# Patient Record
Sex: FEMALE | Race: WHITE | NOT HISPANIC OR LATINO | URBAN - METROPOLITAN AREA
[De-identification: names, ages, dates, MRNs, and addresses within clinical notes are randomized per-mention and may not be internally consistent; named-entity substitution may affect disease eponyms.]

---

## 2017-01-31 ENCOUNTER — OUTPATIENT (OUTPATIENT)
Dept: OUTPATIENT SERVICES | Facility: HOSPITAL | Age: 72
LOS: 1 days | End: 2017-01-31
Payer: COMMERCIAL

## 2017-01-31 DIAGNOSIS — R94.31 ABNORMAL ELECTROCARDIOGRAM [ECG] [EKG]: ICD-10-CM

## 2017-01-31 PROCEDURE — 93017 CV STRESS TEST TRACING ONLY: CPT

## 2017-01-31 PROCEDURE — 78452 HT MUSCLE IMAGE SPECT MULT: CPT

## 2017-01-31 PROCEDURE — A9502: CPT

## 2020-04-11 ENCOUNTER — EMERGENCY (EMERGENCY)
Facility: HOSPITAL | Age: 75
LOS: 1 days | Discharge: HOME | End: 2020-04-11
Attending: EMERGENCY MEDICINE
Payer: COMMERCIAL

## 2020-04-11 VITALS
OXYGEN SATURATION: 95 % | WEIGHT: 145.06 LBS | HEART RATE: 81 BPM | DIASTOLIC BLOOD PRESSURE: 91 MMHG | RESPIRATION RATE: 17 BRPM | SYSTOLIC BLOOD PRESSURE: 145 MMHG | TEMPERATURE: 97 F

## 2020-04-11 PROCEDURE — 73110 X-RAY EXAM OF WRIST: CPT

## 2020-04-11 PROCEDURE — 99283 EMERGENCY DEPT VISIT LOW MDM: CPT | Mod: 25

## 2020-04-11 PROCEDURE — 73110 X-RAY EXAM OF WRIST: CPT | Mod: 26,LT

## 2020-04-11 PROCEDURE — 99283 EMERGENCY DEPT VISIT LOW MDM: CPT

## 2020-04-11 NOTE — ED PROVIDER NOTE - NSFOLLOWUPINSTRUCTIONS_ED_ALL_ED_FT
Please call the orthopedist Dr Vega or your primary care doctor for an appointment this week.  Your xray did not show a broken bone today.  Please wear the splint for comfort as tolerated.  You may remove it.  Please check the skin around the swollen area once a day to make sure the skin doesn't break down.  Please return right away if you have increased pain, swelling, redness, weakness, or sensation changes.  You may take Tylenol acetaminophen) 650mg every 6 hours as needed for pain.

## 2020-04-11 NOTE — ED PROVIDER NOTE - IMAGING STUDIES ADDITIONAL INFORMATION FREE TEXT
Interpreted by ED physician Dr. Batista  wrist x-ray, 3 views  No fracture, no dislocation (joint spaces grossly normal), no Foreign Body noted, soft tissue normal

## 2020-04-11 NOTE — ED PROVIDER NOTE - CHPI ED SYMPTOMS POS
This is a 89yr old female patient admitted for Weakness, presenting with the following:  -There is a Stage 1 Pressure Injury to the Bilateral Heels, as evident by non-blanchable erythema  -There is a Stage 2 Pressure Injury to the Coccyx (0.3cm x 0.3cm), L. (4cm x 3cm), and R. Gluteus (2cm x 3cm) with pink tissue, scant drainage, and periwound maceration
PAIN

## 2020-04-11 NOTE — ED PROVIDER NOTE - PATIENT PORTAL LINK FT
You can access the FollowMyHealth Patient Portal offered by [facility] by registering at the following website: [email or web address]. By joining "[employer]’s FollowMyHealth portal, you will also be able to view your health information using other applications (apps) compatible with our system.

## 2020-04-11 NOTE — DISCHARGE NOTE NURSING/CASE MANAGEMENT/SOCIAL WORK - PATIENT PORTAL LINK FT
You can access the FollowMyHealth Patient Portal offered by [facility] by registering at the following website: [email or web address]. By joining [employer]’s FollowMyHealth portal, you will also be able to view your health information using other applications (apps) compatible with our system.

## 2020-04-11 NOTE — ED PROVIDER NOTE - PHYSICAL EXAMINATION
tenderness, swelling and deformity to L wrist   good capillary refill  pulses intact  no elbow tenderness  no clavicle tenderness  no shoulder tenderness

## 2020-04-11 NOTE — ED PROVIDER NOTE - OBJECTIVE STATEMENT
74 y.o female with a PMHx of mild dementia and no PSHx presents to the ED brought in by ambulance  c/o L wrist pain s/p falling yesterday. Patient denies any shortness of breath or any other acute complaints. NKDA

## 2020-12-15 ENCOUNTER — INPATIENT (INPATIENT)
Facility: HOSPITAL | Age: 75
LOS: 2 days | Discharge: NURSING, REHAB OR LONG-TERM CARE | DRG: 536 | End: 2020-12-18
Attending: HOSPITALIST | Admitting: HOSPITALIST
Payer: COMMERCIAL

## 2020-12-15 VITALS
OXYGEN SATURATION: 99 % | HEART RATE: 84 BPM | WEIGHT: 130.51 LBS | DIASTOLIC BLOOD PRESSURE: 88 MMHG | RESPIRATION RATE: 16 BRPM | SYSTOLIC BLOOD PRESSURE: 137 MMHG | TEMPERATURE: 98 F

## 2020-12-15 DIAGNOSIS — S79.911A UNSPECIFIED INJURY OF RIGHT HIP, INITIAL ENCOUNTER: ICD-10-CM

## 2020-12-15 LAB
ALBUMIN SERPL ELPH-MCNC: 3.5 G/DL — SIGNIFICANT CHANGE UP (ref 3.5–5)
ALP SERPL-CCNC: 101 U/L — SIGNIFICANT CHANGE UP (ref 40–120)
ALT FLD-CCNC: 17 U/L DA — SIGNIFICANT CHANGE UP (ref 10–60)
ANION GAP SERPL CALC-SCNC: 8 MMOL/L — SIGNIFICANT CHANGE UP (ref 5–17)
APTT BLD: 30.8 SEC — SIGNIFICANT CHANGE UP (ref 27.5–35.5)
AST SERPL-CCNC: 23 U/L — SIGNIFICANT CHANGE UP (ref 10–40)
BASOPHILS # BLD AUTO: 0.05 K/UL — SIGNIFICANT CHANGE UP (ref 0–0.2)
BASOPHILS NFR BLD AUTO: 0.4 % — SIGNIFICANT CHANGE UP (ref 0–2)
BILIRUB SERPL-MCNC: 0.7 MG/DL — SIGNIFICANT CHANGE UP (ref 0.2–1.2)
BUN SERPL-MCNC: 19 MG/DL — HIGH (ref 7–18)
CALCIUM SERPL-MCNC: 9.5 MG/DL — SIGNIFICANT CHANGE UP (ref 8.4–10.5)
CHLORIDE SERPL-SCNC: 106 MMOL/L — SIGNIFICANT CHANGE UP (ref 96–108)
CO2 SERPL-SCNC: 26 MMOL/L — SIGNIFICANT CHANGE UP (ref 22–31)
CREAT SERPL-MCNC: 0.93 MG/DL — SIGNIFICANT CHANGE UP (ref 0.5–1.3)
EOSINOPHIL # BLD AUTO: 0.05 K/UL — SIGNIFICANT CHANGE UP (ref 0–0.5)
EOSINOPHIL NFR BLD AUTO: 0.4 % — SIGNIFICANT CHANGE UP (ref 0–6)
GLUCOSE SERPL-MCNC: 137 MG/DL — HIGH (ref 70–99)
HCT VFR BLD CALC: 42.2 % — SIGNIFICANT CHANGE UP (ref 34.5–45)
HGB BLD-MCNC: 13.1 G/DL — SIGNIFICANT CHANGE UP (ref 11.5–15.5)
IMM GRANULOCYTES NFR BLD AUTO: 0.5 % — SIGNIFICANT CHANGE UP (ref 0–1.5)
INR BLD: 1.16 RATIO — SIGNIFICANT CHANGE UP (ref 0.88–1.16)
LYMPHOCYTES # BLD AUTO: 1.45 K/UL — SIGNIFICANT CHANGE UP (ref 1–3.3)
LYMPHOCYTES # BLD AUTO: 11.3 % — LOW (ref 13–44)
MCHC RBC-ENTMCNC: 26.9 PG — LOW (ref 27–34)
MCHC RBC-ENTMCNC: 31 GM/DL — LOW (ref 32–36)
MCV RBC AUTO: 86.7 FL — SIGNIFICANT CHANGE UP (ref 80–100)
MONOCYTES # BLD AUTO: 0.65 K/UL — SIGNIFICANT CHANGE UP (ref 0–0.9)
MONOCYTES NFR BLD AUTO: 5 % — SIGNIFICANT CHANGE UP (ref 2–14)
NEUTROPHILS # BLD AUTO: 10.61 K/UL — HIGH (ref 1.8–7.4)
NEUTROPHILS NFR BLD AUTO: 82.4 % — HIGH (ref 43–77)
NRBC # BLD: 0 /100 WBCS — SIGNIFICANT CHANGE UP (ref 0–0)
PLATELET # BLD AUTO: 198 K/UL — SIGNIFICANT CHANGE UP (ref 150–400)
POTASSIUM SERPL-MCNC: 4.1 MMOL/L — SIGNIFICANT CHANGE UP (ref 3.5–5.3)
POTASSIUM SERPL-SCNC: 4.1 MMOL/L — SIGNIFICANT CHANGE UP (ref 3.5–5.3)
PROT SERPL-MCNC: 7.3 G/DL — SIGNIFICANT CHANGE UP (ref 6–8.3)
PROTHROM AB SERPL-ACNC: 13.7 SEC — HIGH (ref 10.6–13.6)
RBC # BLD: 4.87 M/UL — SIGNIFICANT CHANGE UP (ref 3.8–5.2)
RBC # FLD: 14 % — SIGNIFICANT CHANGE UP (ref 10.3–14.5)
SARS-COV-2 RNA SPEC QL NAA+PROBE: SIGNIFICANT CHANGE UP
SODIUM SERPL-SCNC: 140 MMOL/L — SIGNIFICANT CHANGE UP (ref 135–145)
WBC # BLD: 12.88 K/UL — HIGH (ref 3.8–10.5)
WBC # FLD AUTO: 12.88 K/UL — HIGH (ref 3.8–10.5)

## 2020-12-15 PROCEDURE — 99285 EMERGENCY DEPT VISIT HI MDM: CPT

## 2020-12-15 PROCEDURE — 72131 CT LUMBAR SPINE W/O DYE: CPT | Mod: 26

## 2020-12-15 PROCEDURE — 71045 X-RAY EXAM CHEST 1 VIEW: CPT | Mod: 26

## 2020-12-15 PROCEDURE — 73502 X-RAY EXAM HIP UNI 2-3 VIEWS: CPT | Mod: 26,RT

## 2020-12-15 PROCEDURE — 72100 X-RAY EXAM L-S SPINE 2/3 VWS: CPT | Mod: 26

## 2020-12-15 PROCEDURE — 72192 CT PELVIS W/O DYE: CPT | Mod: 26

## 2020-12-15 PROCEDURE — 73551 X-RAY EXAM OF FEMUR 1: CPT | Mod: 26,RT

## 2020-12-15 PROCEDURE — 99222 1ST HOSP IP/OBS MODERATE 55: CPT

## 2020-12-15 RX ORDER — HYDROMORPHONE HYDROCHLORIDE 2 MG/ML
0.5 INJECTION INTRAMUSCULAR; INTRAVENOUS; SUBCUTANEOUS EVERY 6 HOURS
Refills: 0 | Status: DISCONTINUED | OUTPATIENT
Start: 2020-12-15 | End: 2020-12-16

## 2020-12-15 RX ORDER — MORPHINE SULFATE 50 MG/1
4 CAPSULE, EXTENDED RELEASE ORAL ONCE
Refills: 0 | Status: DISCONTINUED | OUTPATIENT
Start: 2020-12-15 | End: 2020-12-15

## 2020-12-15 RX ORDER — SODIUM CHLORIDE 9 MG/ML
1000 INJECTION, SOLUTION INTRAVENOUS
Refills: 0 | Status: DISCONTINUED | OUTPATIENT
Start: 2020-12-15 | End: 2020-12-17

## 2020-12-15 RX ORDER — GLUCAGON INJECTION, SOLUTION 0.5 MG/.1ML
1 INJECTION, SOLUTION SUBCUTANEOUS ONCE
Refills: 0 | Status: DISCONTINUED | OUTPATIENT
Start: 2020-12-15 | End: 2020-12-17

## 2020-12-15 RX ORDER — ONDANSETRON 8 MG/1
4 TABLET, FILM COATED ORAL ONCE
Refills: 0 | Status: COMPLETED | OUTPATIENT
Start: 2020-12-15 | End: 2020-12-15

## 2020-12-15 RX ORDER — ENOXAPARIN SODIUM 100 MG/ML
40 INJECTION SUBCUTANEOUS DAILY
Refills: 0 | Status: DISCONTINUED | OUTPATIENT
Start: 2020-12-15 | End: 2020-12-18

## 2020-12-15 RX ORDER — SODIUM CHLORIDE 9 MG/ML
1000 INJECTION INTRAMUSCULAR; INTRAVENOUS; SUBCUTANEOUS ONCE
Refills: 0 | Status: COMPLETED | OUTPATIENT
Start: 2020-12-15 | End: 2020-12-15

## 2020-12-15 RX ORDER — SODIUM CHLORIDE 9 MG/ML
1000 INJECTION INTRAMUSCULAR; INTRAVENOUS; SUBCUTANEOUS
Refills: 0 | Status: DISCONTINUED | OUTPATIENT
Start: 2020-12-15 | End: 2020-12-16

## 2020-12-15 RX ORDER — ACETAMINOPHEN 500 MG
650 TABLET ORAL EVERY 6 HOURS
Refills: 0 | Status: DISCONTINUED | OUTPATIENT
Start: 2020-12-15 | End: 2020-12-16

## 2020-12-15 RX ORDER — INSULIN LISPRO 100/ML
VIAL (ML) SUBCUTANEOUS
Refills: 0 | Status: DISCONTINUED | OUTPATIENT
Start: 2020-12-15 | End: 2020-12-17

## 2020-12-15 RX ORDER — MORPHINE SULFATE 50 MG/1
2 CAPSULE, EXTENDED RELEASE ORAL EVERY 8 HOURS
Refills: 0 | Status: DISCONTINUED | OUTPATIENT
Start: 2020-12-15 | End: 2020-12-16

## 2020-12-15 RX ADMIN — MORPHINE SULFATE 4 MILLIGRAM(S): 50 CAPSULE, EXTENDED RELEASE ORAL at 21:00

## 2020-12-15 RX ADMIN — ONDANSETRON 4 MILLIGRAM(S): 8 TABLET, FILM COATED ORAL at 20:36

## 2020-12-15 RX ADMIN — MORPHINE SULFATE 4 MILLIGRAM(S): 50 CAPSULE, EXTENDED RELEASE ORAL at 20:36

## 2020-12-15 RX ADMIN — SODIUM CHLORIDE 2000 MILLILITER(S): 9 INJECTION INTRAMUSCULAR; INTRAVENOUS; SUBCUTANEOUS at 20:35

## 2020-12-15 NOTE — ED PROVIDER NOTE - CARE PLAN
Principal Discharge DX:	Hip injury, right, initial encounter  Secondary Diagnosis:	Fall at home, initial encounter

## 2020-12-15 NOTE — ED ADULT TRIAGE NOTE - CHIEF COMPLAINT QUOTE
pt fell down the stairs at 2 pm today, no LOC , no head injury, c/o lower back pain and right hip pain, pt has a Hx of right hip fracture

## 2020-12-15 NOTE — H&P ADULT - PROBLEM SELECTOR PLAN 1
- Patient presents s/p fall associated with no LOC, headache, dizziness, chest pain, SOB.  - likely mechanical,   - CT head Non-contrast :  no acute hemorhage or stroke   - Fall Precaution   - f/u Vitamin B12, B6, Folate, TSH , lipid profile , hgb a1c  - f/u PT consult  - f/u Nutrition consult

## 2020-12-15 NOTE — H&P ADULT - ASSESSMENT
Patient is 75 year old Female from home lives with  with  PMHx of mild to moderate dementia and no significant PSHx presents to ED after slipping and having fall on stairs. Admitted for pain managment.

## 2020-12-15 NOTE — ED PROVIDER NOTE - CPE EDP MUSC NORM
[General Appearance - Well Developed] : well developed [Heart Rate And Rhythm] : heart rate and rhythm were normal [] : no respiratory distress [Left Infraclavicular] : left infraclavicular area [Clean] : clean [Dry] : dry [Well-Healed] : well-healed [Bowel Sounds] : normal bowel sounds [Nail Clubbing] : no clubbing of the fingernails [Abnormal Walk] : normal gait [Skin Color & Pigmentation] : normal skin color and pigmentation [Oriented To Time, Place, And Person] : oriented to person, place, and time [Erythema] : not erythematous normal...

## 2020-12-15 NOTE — H&P ADULT - PROBLEM SELECTOR PLAN 2
Adequate: hears normal conversation without difficulty Pt after fall landed on her right side   reports pain in rt side of back , rt knee and thigh area  f/u x-ray official report  pain management with tylenol, morphin , hydromorph  bowel regimen

## 2020-12-15 NOTE — ED PROVIDER NOTE - CLINICAL SUMMARY MEDICAL DECISION MAKING FREE TEXT BOX
Pt with mechanical fall and isolated R hip and lower back pain. Will X-ray, medicate, check labs, and likely admit for hip fx.

## 2020-12-15 NOTE — ED PROVIDER NOTE - MUSCULOSKELETAL, MLM
R lateral hip tenderness. R SI tenderness. R leg neurovascularly intact. DP & PT pulses intact 2+ in legs

## 2020-12-15 NOTE — ED PROVIDER NOTE - OBJECTIVE STATEMENT
76 y/o F pt with no significant PMHx and no significant PSHx presents to ED s/p fall from stairs c/o R lower back and R hip pain today. Pt reportedly unable to get up secondary to the pain. Pt states she was going up the steps when she slipped and fell. Pt denies LOC, head injury, neck injury, chest pain, abd pain, weakness, paresthesias, bowel or urinary incontinence, or any other acute complaints. NKDA.

## 2020-12-15 NOTE — ED PROVIDER NOTE - CHPI ED SYMPTOMS NEG
no head injury, no neck injury, no chest pain, no abd pain, no weakness, no paresthesias, no bowel or urinary incontinence/no loss of consciousness

## 2020-12-15 NOTE — H&P ADULT - NSHPPHYSICALEXAM_GEN_ALL_CORE
PHYSICAL EXAM:  GENERAL: speaks in full sentences, no signs of respiratory distress, distress from back pain on movement  HEAD:  Atraumatic, Normocephalic  EYES: EOMI, PERRLA, conjunctiva and sclera clear  NECK: Supple, No JVD  CHEST/LUNG: Clear to auscultation bilaterally; No wheeze; No crackles; No accessory muscles used  HEART: s1 s2; No murmurs;   ABDOMEN: Soft, Nontender, Nondistended; Bowel sounds present; No guarding  EXTREMITIES:  2+ Peripheral Pulses, No cyanosis or edema  PSYCH: AAOx3  NEUROLOGY: no-focal deficit  SKIN: No rashes or lesions

## 2020-12-15 NOTE — H&P ADULT - ATTENDING COMMENTS
Pt seen and examined  Case discussed with MAR.  75 year old woman with PMH of mild dementia and surgical hx of right hip replacement for right hip fracture brought in after a fall down the stairs while climbing at home today. There was no head trauma or LOC. No preceding cardiac or neurological symptoms. She complained of lower back and right hip pain with difficulty getting up.    Vital Signs Last 24 Hrs  T(C): 36.7 (15 Dec 2020 19:40), Max: 36.7 (15 Dec 2020 19:40)  T(F): 98 (15 Dec 2020 19:40), Max: 98 (15 Dec 2020 19:40)  HR: 84 (15 Dec 2020 19:40) (84 - 84)  BP: 137/88 (15 Dec 2020 19:40) (137/88 - 137/88)  RR: 16 (15 Dec 2020 19:40) (16 - 16)  SpO2: 99% (15 Dec 2020 19:40) (99% - 99%)                            13.1   12.88 )-----------( 198      ( 15 Dec 2020 20:26 )             42.2     12-15    140  |  106  |  19<H>  ----------------------------<  137<H>  4.1   |  26  |  0.93    Ca    9.5      15 Dec 2020 20:26  TPro  7.3  /  Alb  3.5  /  TBili  0.7  /  DBili  x   /  AST  23  /  ALT  17  /  AlkPhos  101  12-15    PT/INR - ( 15 Dec 2020 20:26 )   PT: 13.7 sec;   INR: 1.16 ratio    PTT - ( 15 Dec 2020 20:26 )  PTT:30.8 sec    XR RIGHT Hip    No fracture noted in the pelvis or in the replaced titanium hip.     XR femur  ?? Suspected fracture of the distal femoral condyle or normal patella    Impression  1) Mechanical fall  2) Right hip pain   3) Physical debility   4)     Plan  Pain control   PT evaluation   IVF gentle hydration Pt seen and examined  Case discussed with MAR.  75 year old woman with PMH of mild dementia and surgical hx of right hip replacement for right hip fracture brought in after a fall down the stairs while climbing at home today. There was no head trauma or LOC. No preceding cardiac or neurological symptoms. She complained of lower back and right hip pain with difficulty getting up.    Vital Signs Last 24 Hrs  T(C): 36.7 (15 Dec 2020 19:40), Max: 36.7 (15 Dec 2020 19:40)  T(F): 98 (15 Dec 2020 19:40), Max: 98 (15 Dec 2020 19:40)  HR: 84 (15 Dec 2020 19:40) (84 - 84)  BP: 137/88 (15 Dec 2020 19:40) (137/88 - 137/88)  RR: 16 (15 Dec 2020 19:40) (16 - 16)  SpO2: 99% (15 Dec 2020 19:40) (99% - 99%)    Elderly woman, lying in bed, NAD, AAO X 3  CTA B/L RRR S1S2 only,   Soft nt ND BS +  Active and passive ROM in both right and left hip and knee joint with no painful restriction  No pedal edema  No focal deficits                          13.1   12.88 )-----------( 198      ( 15 Dec 2020 20:26 )             42.2     12-15    140  |  106  |  19<H>  ----------------------------<  137<H>  4.1   |  26  |  0.93    Ca    9.5      15 Dec 2020 20:26  TPro  7.3  /  Alb  3.5  /  TBili  0.7  /  DBili  x   /  AST  23  /  ALT  17  /  AlkPhos  101  12-15    PT/INR - ( 15 Dec 2020 20:26 )   PT: 13.7 sec;   INR: 1.16 ratio    PTT - ( 15 Dec 2020 20:26 )  PTT:30.8 sec    XR RIGHT Hip    No fracture noted in the pelvis or in the replaced titanium hip.     XR femur  ?? Suspected fracture of the distal femoral condyle or normal patella    Impression  75 year old woman with no significant medical problems with hx of right THR s/p fall- mechanical fall on the right hip with no evidence of a fracture or joint damage  A/P  1) Mechanical fall  2) Right hip pain   3) Physical debility     Plan  1) Pain control   2) PT evaluation   3) Fall risk protocol  4) IVF gentle hydration - X 10 hours  5) Discharge planning - within 12 - 24 hours when able to ambulate.

## 2020-12-15 NOTE — H&P ADULT - HISTORY OF PRESENT ILLNESS
Patient is 75 year old Female from home lives with  with  PMHx of mild to moderate dementia and no significant PSHx presents to ED after slipping and having fall on stairs. Pt reports of landing on her right side so having Rt lower back and Rt hip pain from fall. Pt reportedly unable to get up secondary to the pain which is 9/10 and responds to pain medications. Pt states she was going up the steps when she slipped and fell. Pt reports her bathroom is upstairs so she use stairs several times a day. Pt reports to be very health all herlife.   Pt denies LOC, head injury, neck injury, chest pain, abd pain, weakness, paresthesias, bowel or urinary incontinence, or any other acute complaints.     GOC: pT said she never thought of these things. Full code for now

## 2020-12-16 DIAGNOSIS — S79.911A UNSPECIFIED INJURY OF RIGHT HIP, INITIAL ENCOUNTER: ICD-10-CM

## 2020-12-16 DIAGNOSIS — M54.9 DORSALGIA, UNSPECIFIED: ICD-10-CM

## 2020-12-16 DIAGNOSIS — M97.8XXA PERIPROSTHETIC FRACTURE AROUND OTHER INTERNAL PROSTHETIC JOINT, INITIAL ENCOUNTER: ICD-10-CM

## 2020-12-16 DIAGNOSIS — Z29.9 ENCOUNTER FOR PROPHYLACTIC MEASURES, UNSPECIFIED: ICD-10-CM
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
## 2020-12-16 DIAGNOSIS — Z02.9 ENCOUNTER FOR ADMINISTRATIVE EXAMINATIONS, UNSPECIFIED: ICD-10-CM

## 2020-12-16 DIAGNOSIS — M25.551 PAIN IN RIGHT HIP: ICD-10-CM

## 2020-12-16 DIAGNOSIS — Z71.89 OTHER SPECIFIED COUNSELING: ICD-10-CM

## 2020-12-16 PROBLEM — Z78.9 OTHER SPECIFIED HEALTH STATUS: Chronic | Status: ACTIVE | Noted: 2020-04-11

## 2020-12-16 LAB
24R-OH-CALCIDIOL SERPL-MCNC: 26.8 NG/ML — LOW (ref 30–80)
A1C WITH ESTIMATED AVERAGE GLUCOSE RESULT: 5 % — SIGNIFICANT CHANGE UP (ref 4–5.6)
ALBUMIN SERPL ELPH-MCNC: 3.1 G/DL — LOW (ref 3.5–5)
ALP SERPL-CCNC: 84 U/L — SIGNIFICANT CHANGE UP (ref 40–120)
ALT FLD-CCNC: 12 U/L DA — SIGNIFICANT CHANGE UP (ref 10–60)
AMMONIA BLD-MCNC: 12 UMOL/L — SIGNIFICANT CHANGE UP (ref 11–32)
ANION GAP SERPL CALC-SCNC: 6 MMOL/L — SIGNIFICANT CHANGE UP (ref 5–17)
APPEARANCE UR: CLEAR — SIGNIFICANT CHANGE UP
AST SERPL-CCNC: 16 U/L — SIGNIFICANT CHANGE UP (ref 10–40)
BACTERIA # UR AUTO: ABNORMAL /HPF
BASOPHILS # BLD AUTO: 0.04 K/UL — SIGNIFICANT CHANGE UP (ref 0–0.2)
BASOPHILS NFR BLD AUTO: 0.5 % — SIGNIFICANT CHANGE UP (ref 0–2)
BILIRUB SERPL-MCNC: 0.7 MG/DL — SIGNIFICANT CHANGE UP (ref 0.2–1.2)
BILIRUB UR-MCNC: NEGATIVE — SIGNIFICANT CHANGE UP
BUN SERPL-MCNC: 19 MG/DL — HIGH (ref 7–18)
CALCIUM SERPL-MCNC: 9.5 MG/DL — SIGNIFICANT CHANGE UP (ref 8.4–10.5)
CHLORIDE SERPL-SCNC: 106 MMOL/L — SIGNIFICANT CHANGE UP (ref 96–108)
CHOLEST SERPL-MCNC: 173 MG/DL — SIGNIFICANT CHANGE UP
CK MB BLD-MCNC: 1.8 % — SIGNIFICANT CHANGE UP (ref 0–3.5)
CK MB CFR SERPL CALC: 1.1 NG/ML — SIGNIFICANT CHANGE UP (ref 0–3.6)
CK SERPL-CCNC: 61 U/L — SIGNIFICANT CHANGE UP (ref 21–215)
CO2 SERPL-SCNC: 28 MMOL/L — SIGNIFICANT CHANGE UP (ref 22–31)
COLOR SPEC: YELLOW — SIGNIFICANT CHANGE UP
COMMENT - URINE: SIGNIFICANT CHANGE UP
CREAT SERPL-MCNC: 0.86 MG/DL — SIGNIFICANT CHANGE UP (ref 0.5–1.3)
DIFF PNL FLD: ABNORMAL
EOSINOPHIL # BLD AUTO: 0.02 K/UL — SIGNIFICANT CHANGE UP (ref 0–0.5)
EOSINOPHIL NFR BLD AUTO: 0.2 % — SIGNIFICANT CHANGE UP (ref 0–6)
EPI CELLS # UR: SIGNIFICANT CHANGE UP /HPF
ERYTHROCYTE [SEDIMENTATION RATE] IN BLOOD: 11 MM/HR — SIGNIFICANT CHANGE UP (ref 0–20)
ESTIMATED AVERAGE GLUCOSE: 97 MG/DL — SIGNIFICANT CHANGE UP (ref 68–114)
ETHANOL SERPL-MCNC: <3 MG/DL — SIGNIFICANT CHANGE UP (ref 0–10)
GLUCOSE BLDC GLUCOMTR-MCNC: 102 MG/DL — HIGH (ref 70–99)
GLUCOSE BLDC GLUCOMTR-MCNC: 114 MG/DL — HIGH (ref 70–99)
GLUCOSE BLDC GLUCOMTR-MCNC: 139 MG/DL — HIGH (ref 70–99)
GLUCOSE BLDC GLUCOMTR-MCNC: 92 MG/DL — SIGNIFICANT CHANGE UP (ref 70–99)
GLUCOSE BLDC GLUCOMTR-MCNC: 99 MG/DL — SIGNIFICANT CHANGE UP (ref 70–99)
GLUCOSE SERPL-MCNC: 115 MG/DL — HIGH (ref 70–99)
GLUCOSE UR QL: NEGATIVE — SIGNIFICANT CHANGE UP
HCT VFR BLD CALC: 36.9 % — SIGNIFICANT CHANGE UP (ref 34.5–45)
HDLC SERPL-MCNC: 70 MG/DL — SIGNIFICANT CHANGE UP
HGB BLD-MCNC: 11.6 G/DL — SIGNIFICANT CHANGE UP (ref 11.5–15.5)
HYALINE CASTS # UR AUTO: ABNORMAL /LPF
IMM GRANULOCYTES NFR BLD AUTO: 0.5 % — SIGNIFICANT CHANGE UP (ref 0–1.5)
KETONES UR-MCNC: ABNORMAL
LDH SERPL L TO P-CCNC: 167 U/L — SIGNIFICANT CHANGE UP (ref 120–225)
LEUKOCYTE ESTERASE UR-ACNC: ABNORMAL
LIPID PNL WITH DIRECT LDL SERPL: 91 MG/DL — SIGNIFICANT CHANGE UP
LYMPHOCYTES # BLD AUTO: 1.79 K/UL — SIGNIFICANT CHANGE UP (ref 1–3.3)
LYMPHOCYTES # BLD AUTO: 20.7 % — SIGNIFICANT CHANGE UP (ref 13–44)
MAGNESIUM SERPL-MCNC: 2.1 MG/DL — SIGNIFICANT CHANGE UP (ref 1.6–2.6)
MCHC RBC-ENTMCNC: 27.4 PG — SIGNIFICANT CHANGE UP (ref 27–34)
MCHC RBC-ENTMCNC: 31.4 GM/DL — LOW (ref 32–36)
MCV RBC AUTO: 87 FL — SIGNIFICANT CHANGE UP (ref 80–100)
MONOCYTES # BLD AUTO: 0.55 K/UL — SIGNIFICANT CHANGE UP (ref 0–0.9)
MONOCYTES NFR BLD AUTO: 6.4 % — SIGNIFICANT CHANGE UP (ref 2–14)
NEUTROPHILS # BLD AUTO: 6.19 K/UL — SIGNIFICANT CHANGE UP (ref 1.8–7.4)
NEUTROPHILS NFR BLD AUTO: 71.7 % — SIGNIFICANT CHANGE UP (ref 43–77)
NITRITE UR-MCNC: POSITIVE
NON HDL CHOLESTEROL: 103 MG/DL — SIGNIFICANT CHANGE UP
NRBC # BLD: 0 /100 WBCS — SIGNIFICANT CHANGE UP (ref 0–0)
NT-PROBNP SERPL-SCNC: 1290 PG/ML — HIGH (ref 0–450)
PH UR: 5 — SIGNIFICANT CHANGE UP (ref 5–8)
PHOSPHATE SERPL-MCNC: 3.4 MG/DL — SIGNIFICANT CHANGE UP (ref 2.5–4.5)
PLATELET # BLD AUTO: 176 K/UL — SIGNIFICANT CHANGE UP (ref 150–400)
POTASSIUM SERPL-MCNC: 4.3 MMOL/L — SIGNIFICANT CHANGE UP (ref 3.5–5.3)
POTASSIUM SERPL-SCNC: 4.3 MMOL/L — SIGNIFICANT CHANGE UP (ref 3.5–5.3)
PROT SERPL-MCNC: 6.4 G/DL — SIGNIFICANT CHANGE UP (ref 6–8.3)
PROT UR-MCNC: 15
RBC # BLD: 4.24 M/UL — SIGNIFICANT CHANGE UP (ref 3.8–5.2)
RBC # FLD: 14.1 % — SIGNIFICANT CHANGE UP (ref 10.3–14.5)
RBC CASTS # UR COMP ASSIST: ABNORMAL /HPF (ref 0–2)
SODIUM SERPL-SCNC: 140 MMOL/L — SIGNIFICANT CHANGE UP (ref 135–145)
SP GR SPEC: 1.02 — SIGNIFICANT CHANGE UP (ref 1.01–1.02)
TRIGL SERPL-MCNC: 61 MG/DL — SIGNIFICANT CHANGE UP
TROPONIN I SERPL-MCNC: <0.015 NG/ML — SIGNIFICANT CHANGE UP (ref 0–0.04)
TSH SERPL-MCNC: 1.42 UU/ML — SIGNIFICANT CHANGE UP (ref 0.34–4.82)
URATE SERPL-MCNC: 4.5 MG/DL — SIGNIFICANT CHANGE UP (ref 2.5–7)
UROBILINOGEN FLD QL: NEGATIVE — SIGNIFICANT CHANGE UP
VIT B12 SERPL-MCNC: 546 PG/ML — SIGNIFICANT CHANGE UP (ref 232–1245)
WBC # BLD: 8.63 K/UL — SIGNIFICANT CHANGE UP (ref 3.8–10.5)
WBC # FLD AUTO: 8.63 K/UL — SIGNIFICANT CHANGE UP (ref 3.8–10.5)
WBC UR QL: SIGNIFICANT CHANGE UP /HPF (ref 0–5)

## 2020-12-16 PROCEDURE — 99222 1ST HOSP IP/OBS MODERATE 55: CPT

## 2020-12-16 PROCEDURE — 99232 SBSQ HOSP IP/OBS MODERATE 35: CPT

## 2020-12-16 RX ORDER — PANTOPRAZOLE SODIUM 20 MG/1
40 TABLET, DELAYED RELEASE ORAL
Refills: 0 | Status: DISCONTINUED | OUTPATIENT
Start: 2020-12-16 | End: 2020-12-18

## 2020-12-16 RX ORDER — LOSARTAN POTASSIUM 100 MG/1
0 TABLET, FILM COATED ORAL
Qty: 0 | Refills: 0 | DISCHARGE

## 2020-12-16 RX ORDER — SODIUM CHLORIDE 9 MG/ML
1000 INJECTION INTRAMUSCULAR; INTRAVENOUS; SUBCUTANEOUS
Refills: 0 | Status: DISCONTINUED | OUTPATIENT
Start: 2020-12-16 | End: 2020-12-18

## 2020-12-16 RX ORDER — POLYETHYLENE GLYCOL 3350 17 G/17G
17 POWDER, FOR SOLUTION ORAL DAILY
Refills: 0 | Status: DISCONTINUED | OUTPATIENT
Start: 2020-12-16 | End: 2020-12-18

## 2020-12-16 RX ORDER — ASPIRIN/CALCIUM CARB/MAGNESIUM 324 MG
0 TABLET ORAL
Qty: 0 | Refills: 0 | DISCHARGE

## 2020-12-16 RX ORDER — INFLUENZA VIRUS VACCINE 15; 15; 15; 15 UG/.5ML; UG/.5ML; UG/.5ML; UG/.5ML
0.5 SUSPENSION INTRAMUSCULAR ONCE
Refills: 0 | Status: COMPLETED | OUTPATIENT
Start: 2020-12-16 | End: 2020-12-18

## 2020-12-16 RX ORDER — ACETAMINOPHEN 500 MG
1000 TABLET ORAL EVERY 8 HOURS
Refills: 0 | Status: DISCONTINUED | OUTPATIENT
Start: 2020-12-16 | End: 2020-12-18

## 2020-12-16 RX ORDER — OXYCODONE HYDROCHLORIDE 5 MG/1
5 TABLET ORAL EVERY 4 HOURS
Refills: 0 | Status: DISCONTINUED | OUTPATIENT
Start: 2020-12-16 | End: 2020-12-18

## 2020-12-16 RX ORDER — RIVASTIGMINE 4.6 MG/24H
0 PATCH, EXTENDED RELEASE TRANSDERMAL
Qty: 0 | Refills: 0 | DISCHARGE

## 2020-12-16 RX ORDER — SENNA PLUS 8.6 MG/1
2 TABLET ORAL AT BEDTIME
Refills: 0 | Status: DISCONTINUED | OUTPATIENT
Start: 2020-12-16 | End: 2020-12-18

## 2020-12-16 RX ADMIN — SENNA PLUS 2 TABLET(S): 8.6 TABLET ORAL at 21:32

## 2020-12-16 RX ADMIN — SODIUM CHLORIDE 70 MILLILITER(S): 9 INJECTION INTRAMUSCULAR; INTRAVENOUS; SUBCUTANEOUS at 01:23

## 2020-12-16 RX ADMIN — Medication 1000 MILLIGRAM(S): at 23:15

## 2020-12-16 RX ADMIN — Medication 375 MILLIGRAM(S): at 13:45

## 2020-12-16 RX ADMIN — Medication 1000 MILLIGRAM(S): at 13:44

## 2020-12-16 RX ADMIN — ENOXAPARIN SODIUM 40 MILLIGRAM(S): 100 INJECTION SUBCUTANEOUS at 11:17

## 2020-12-16 RX ADMIN — MORPHINE SULFATE 2 MILLIGRAM(S): 50 CAPSULE, EXTENDED RELEASE ORAL at 02:22

## 2020-12-16 RX ADMIN — Medication 375 MILLIGRAM(S): at 13:03

## 2020-12-16 RX ADMIN — Medication 375 MILLIGRAM(S): at 23:15

## 2020-12-16 RX ADMIN — ENOXAPARIN SODIUM 40 MILLIGRAM(S): 100 INJECTION SUBCUTANEOUS at 01:24

## 2020-12-16 RX ADMIN — Medication 375 MILLIGRAM(S): at 21:32

## 2020-12-16 RX ADMIN — POLYETHYLENE GLYCOL 3350 17 GRAM(S): 17 POWDER, FOR SOLUTION ORAL at 11:17

## 2020-12-16 RX ADMIN — MORPHINE SULFATE 2 MILLIGRAM(S): 50 CAPSULE, EXTENDED RELEASE ORAL at 02:11

## 2020-12-16 RX ADMIN — Medication 1000 MILLIGRAM(S): at 13:02

## 2020-12-16 RX ADMIN — Medication 1000 MILLIGRAM(S): at 21:32

## 2020-12-16 NOTE — CONSULT NOTE ADULT - PROBLEM SELECTOR RECOMMENDATION 9
- Imaging shows Status post right total hip arthroplasty. Mildly displaced acute right proximal femoral periprosthetic fracture in the intertrochanteric/subtrochanteric region. No surgical intervention at this moment.   Nonopioid Recommendations  - mild pain - nonpharm recommendations  - Acetaminophen 1 gram po q 8 for 3 days  - Naproxen 375mg po q 8 hours atc for 3 days  Opioid Recommendation  - Oxycodone 5mg po q 4 hours prn  Bowel Regimen   - Senna  - Miralax  OOB/PT - toe touch

## 2020-12-16 NOTE — CONSULT NOTE ADULT - ASSESSMENT
a Detail Level: Printer-Friendly View Extended View  Confidential Drug Utilization Report  Search Terms: shaneka anthony, 1945Search Date: 12/16/2020 16:17:17 PM  The Drug Utilization Report below displays all of the controlled substance prescriptions, if any, that your patient has filled in the last twelve months. The information displayed on this report is compiled from pharmacy submissions to the Department, and accurately reflects the information as submitted by the pharmacies.    This report was requested by: Winter Barker | Reference #: [id number]    Others' Prescriptions  Patient Name: Shaneka AnthonyBirth Date: 1945  Address: [place], [place] [address]W[place]: Female  Rx Written	Rx Dispensed	Drug	Quantity	Days Supply	Prescriber Name	Payment Method	Dispenser  01/15/2020	01/19/2020	oxycodone-acetaminophen 5-325 mg tab	60	10	Letha Campbell	Insurance	[facility]  01/16/2020	01/16/2020	oxycodone-acetaminophen 5-325 mg tab	30	5	Samuel Cordova MD	[facility]  * - Drugs marked with an asterisk are compound drugs. If the compound drug is made up of more than one controlled substance, then each controlled

## 2020-12-16 NOTE — PROGRESS NOTE ADULT - SUBJECTIVE AND OBJECTIVE BOX
NP Note discussed with Primary Attending      Patient is a 75y old  Female who presents with a chief complaint of fall      INTERVAL HPI/OVERNIGHT EVENTS: no new complaints    MEDICATIONS  (STANDING):  acetaminophen   Tablet .. 1000 milliGRAM(s) Oral every 8 hours  dextrose 5%. 1000 milliLiter(s) (100 mL/Hr) IV Continuous <Continuous>  enoxaparin Injectable 40 milliGRAM(s) SubCutaneous daily  glucagon  Injectable 1 milliGRAM(s) IntraMuscular once  influenza   Vaccine 0.5 milliLiter(s) IntraMuscular once  insulin lispro (ADMELOG) corrective regimen sliding scale   SubCutaneous three times a day before meals  naproxen 375 milliGRAM(s) Oral every 8 hours  pantoprazole    Tablet 40 milliGRAM(s) Oral before breakfast  polyethylene glycol 3350 17 Gram(s) Oral daily  senna 2 Tablet(s) Oral at bedtime  sodium chloride 0.9%. 1000 milliLiter(s) (70 mL/Hr) IV Continuous <Continuous>    MEDICATIONS  (PRN):  oxyCODONE    IR 5 milliGRAM(s) Oral every 4 hours PRN Severe Pain (7 - 10)      __________________________________________________  REVIEW OF SYSTEMS:    CONSTITUTIONAL: No fever,   EYES: no acute visual disturbances  NECK: No pain or stiffness  RESPIRATORY: No cough; No shortness of breath  CARDIOVASCULAR: No chest pain, no palpitations  GASTROINTESTINAL: No pain. No nausea or vomiting; No diarrhea   NEUROLOGICAL: No headache or numbness, no tremors  MUSCULOSKELETAL: No joint pain, no muscle pain, right hip and tight tenderness.  GENITOURINARY: no dysuria, no frequency, no hesitancy  PSYCHIATRY: no depression , no anxiety  ALL OTHER  ROS negative        Vital Signs Last 24 Hrs  T(C): 36.5 (16 Dec 2020 05:34), Max: 36.7 (15 Dec 2020 19:40)  T(F): 97.7 (16 Dec 2020 05:34), Max: 98.1 (16 Dec 2020 01:27)  HR: 62 (16 Dec 2020 05:34) (62 - 84)  BP: 132/71 (16 Dec 2020 05:34) (126/76 - 137/88)  BP(mean): --  RR: 18 (16 Dec 2020 05:34) (16 - 18)  SpO2: 100% (16 Dec 2020 05:34) (98% - 100%)    ________________________________________________  PHYSICAL EXAM:  GENERAL: NAD  HEENT: Normocephalic;  conjunctivae and sclerae clear; moist mucous membranes;   NECK : supple  CHEST/LUNG: Clear to auscultation bilaterally with good air entry   HEART: S1 S2  regular; no murmurs, gallops or rubs  ABDOMEN: Soft, Nontender, Nondistended; Bowel sounds present  EXTREMITIES: no cyanosis; no edema; no calf tenderness  SKIN: warm and dry; no rash  NERVOUS SYSTEM:  Hx of mild dementia, Awake and alert; Oriented  to place, person disoriented to  time ; no new deficits    _________________________________________________  LABS:                        11.6   8.63  )-----------( 176      ( 16 Dec 2020 07:11 )             36.9     12-16    140  |  106  |  19<H>  ----------------------------<  115<H>  4.3   |  28  |  0.86    Ca    9.5      16 Dec 2020 07:11  Phos  3.4     12-16  Mg     2.1     12-16    TPro  6.4  /  Alb  3.1<L>  /  TBili  0.7  /  DBili  x   /  AST  16  /  ALT  12  /  AlkPhos  84  12-16    PT/INR - ( 15 Dec 2020 20:26 )   PT: 13.7 sec;   INR: 1.16 ratio         PTT - ( 15 Dec 2020 20:26 )  PTT:30.8 sec  Urinalysis Basic - ( 16 Dec 2020 02:26 )    Color: Yellow / Appearance: Clear / S.020 / pH: x  Gluc: x / Ketone: Small  / Bili: Negative / Urobili: Negative   Blood: x / Protein: 15 / Nitrite: Positive   Leuk Esterase: Trace / RBC: 10-25 /HPF / WBC 3-5 /HPF   Sq Epi: x / Non Sq Epi: Few /HPF / Bacteria: Few /HPF      CAPILLARY BLOOD GLUCOSE      POCT Blood Glucose.: 102 mg/dL (16 Dec 2020 08:15)        RADIOLOGY & ADDITIONAL TESTS:      EXAM:  CT PELVIS BONY ONLY                            PROCEDURE DATE:  12/15/2020          INTERPRETATION:  HISTORY: Right-sided hip fracture status post fall.    Helical CT imaging of the bony pelvis was performed without intravenous contrast. Sagittal and coronal reformats were provided. 3-D reformats were performed on a separate workstation.    Correlation is made with radiographs from the same day.    Findings:    Patient is status post right total hip arthroplasty with a noncemented femoral component. There is an acute mildly displaced periprosthetic fracture along the anterior cortex of the right femur in the intertrochanteric/subtrochanteric region. There is 0.9 cm of displacement of the dominant fracture fragment. Hardware appears otherwise intact. There is surrounding intramuscular myofascial edema about the fracture.    Chronic healed bilateral superior and inferior pubic rami fractures are noted.    Left hip joint space is preserved. 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 There is lower lumbar spondylosis.    Aspect uterine fibroids are noted. There is colonic diverticulosis.    Impression:    Status post right total hip arthroplasty. Mildly displaced acute right proximal femoral periprosthetic fracture in the intertrochanteric/subtrochanteric region. This change to the preliminary report was discussed with nurse practitioner Jerry on 2020 at 0836 hours.            GHAZAL PARTIDA MD; Attending Radiologist  This document has been electronically signed. Dec 16 2020  8:37AM      EXAM:  XR CHEST AP OR PA 1V                            PROCEDURE DATE:  12/15/2020          INTERPRETATION:  AP chest on December 15, 2020 at 9:15 PM. Patient had a fall.    COMPARISON: None available.    Heart is enlarged.    There is slight linear atelectasis at the left base.    Slight blunting of right base laterally is also seen.    There is a mild to and fro thoracolumbar curve.    IMPRESSION: Heart enlargement and slight linear basal lung findings.        MARISSA GONZALES M.D., ATTENDING RADIOLOGIST  This document has been electronically signed. Dec 16 2020 10:08AM    Imaging  Reviewed:  YES    Consultant(s) Notes Reviewed:   YES      Plan of care was discussed with patient and /or primary care giver; all questions and concerns were addressed

## 2020-12-16 NOTE — DIETITIAN INITIAL EVALUATION ADULT. - OTHER INFO
Patient from home lives with . Visited pt. alert but mildly agitated, reports po intake usually good, consumes 3 meals daily & able to prepares most of her meals daily, denies nausea/vomiting or diarrhea but complains of constipation, able to chew/swallow without difficulty, stated  Lbs >2 wks & unsure of any weight changes?. Presently pt. reports consumed ~45% of breakfast tray & in mild pain, obtained food choices, updated kitchen, encourage po intake, on pain management, no edema, skin intact.

## 2020-12-16 NOTE — DIETITIAN INITIAL EVALUATION ADULT. - PERTINENT LABORATORY DATA
12-16 Na140 mmol/L Glu 115 mg/dL<H> K+ 4.3 mmol/L Cr  0.86 mg/dL BUN 19 mg/dL<H> 12-16 Phos 3.4 mg/dL 12-16 Alb 3.1 g/dL<L> 12-16 Chol 173 mg/dL LDL --    HDL 70 mg/dL Trig 61 mg/dL

## 2020-12-16 NOTE — PROGRESS NOTE ADULT - PROBLEM SELECTOR PLAN 1
Patient s/p mechanical fall with pain to right hip and tight.   CT pelvis-  acute mildly displaced periprosthetic fracture along the anterior cortex of the right femur in the intertrochanteric/subtrochanteric region. There is 0.9 cm of displacement of the dominant fracture fragment.  Ortho consulted, f/u recommendations.  pain mgnt. onboard

## 2020-12-16 NOTE — DIETITIAN INITIAL EVALUATION ADULT. - PROBLEM SELECTOR PLAN 2
Pt after fall landed on her right side   reports pain in rt side of back , rt knee and thigh area  f/u x-ray official report  pain management with tylenol, morphin , hydromorph  bowel regimen

## 2020-12-16 NOTE — PHYSICAL THERAPY INITIAL EVALUATION ADULT - PERTINENT HX OF CURRENT PROBLEM, REHAB EVAL
Patient s/p fall at home. Imaging shows minimally displaced acute R proximal femoral periprosthetic fracture. Patient cleared by ortho for PT. Patient stated that she had hip surgery early this year

## 2020-12-16 NOTE — CONSULT NOTE ADULT - SUBJECTIVE AND OBJECTIVE BOX
SAUL VELASQUEZ  [id number]     ORTHOPEDIC CONSULT    Diagnosis:  R femur dino-prosthetic fx    75yFemale     Patient is 75 year old Female from home lives with  with  PMHx of mild to moderate dementia and no significant PSHx presents to ED after slipping and having fall on stairs. Pt reports of landing on her right side so having Rt lower back and Rt hip pain from fall. Pt reportedly unable to get up secondary to the pain which is 9/10 and responds to pain medications. Pt states she was going up the steps when she slipped and fell. Pt reports her bathroom is upstairs so she use stairs several times a day. Pt reports to be very health all herlife.   Pt denies LOC, head injury, neck injury, chest pain, abd pain, weakness, paresthesias, bowel or urinary incontinence, or any other acute complaints.     GOC: pT said she never thought of these things. Full code for now    74 y/o F presents to the hospital s/p mechanical fall about 1 week ago. Pt states she was walking up the stairs at home and slipped and fell onto her R side. Denies hitting her head or any LOC. Pt states the pain worsened throughout the week causing her to use a walker to ambulate, otherwise she ambulates independently Pt states the pain is localized to the R hip and is 6/10- comes on only with movement. Denies any numbness/tingling. Of note- pt is s/p R LEONIE several years ago although does not recall exactly when or where.   Pt was seen ambulating in the room without a limp or any pain to the right hip.  Denies CP/SOB, dyspnea, paresthesias, N/V/D, palpitations.     Vital Signs Last 24 Hrs  T(C): 36.7 (16 Dec 2020 14:22), Max: 36.7 (15 Dec 2020 19:40)  T(F): 98 (16 Dec 2020 14:22), Max: 98.1 (16 Dec 2020 01:27)  HR: 69 (16 Dec 2020 14:22) (62 - 84)  BP: 117/88 (16 Dec 2020 14:22) (117/88 - 137/88)  BP(mean): --  RR: 18 (16 Dec 2020 14:22) (16 - 18)  SpO2: 98% (16 Dec 2020 14:22) (98% - 100%)  I&O's Detail      Physical Exam:    General:  NAD, resting comfortably in bed.    R Hip: No deformity, No erythema/ecchymosis, Skin is pink and warm, old incision from LEONIE well healed. Minimally tender to the lateral aspect of her hip. ROM of the hip- 0-90 degrees with minimal pain, able to IR and ER with minimal pain and able to straight leg raise. Negative log roll test, negative heel strike.   Lower extremity:  No calf tenderness, calves are soft. 2+pulses. NVI. (+)EHL/FHL/ADF/APF.  Good capillary refill. Warm, well-perfused. SILT.                           11.6   8.63  )-----------( 176      ( 16 Dec 2020 07:11 )             36.9     12-16    140  |  106  |  19<H>  ----------------------------<  115<H>  4.3   |  28  |  0.86    Ca    9.5      16 Dec 2020 07:11  Phos  3.4     12-16  Mg     2.1     12-16    TPro  6.4  /  Alb  3.1<L>  /  TBili  0.7  /  DBili  x   /  AST  16  /  ALT  12  /  AlkPhos  84  12-16    PT/INR - ( 15 Dec 2020 20:26 )   PT: 13.7 sec;   INR: 1.16 ratio         PTT - ( 15 Dec 2020 20:26 )  PTT:30.8 sec  Urinalysis Basic - ( 16 Dec 2020 02:26 )    Color: Yellow / Appearance: Clear / S.020 / pH: x  Gluc: x / Ketone: Small  / Bili: Negative / Urobili: Negative   Blood: x / Protein: 15 / Nitrite: Positive   Leuk Esterase: Trace / RBC: 10-25 /HPF / WBC 3-5 /HPF   Sq Epi: x / Non Sq Epi: Few /HPF / Bacteria: Few /HPF    CT scan: Status post right total hip arthroplasty. Mildly displaced acute right proximal femoral periprosthetic fracture in the intertrochanteric/subtrochanteric region.     Impression:  75yFemale with R Hip dino-prosthetic fx    Plan:  -  D/w - Recommends conservative management- WBAT to the RLE with assistive devices  -  Pain control  -  Daily PT  -  DVT ppx  -  D/w Medical team  -  Follow-up with Dr. Deshpande in ONE WEEK at [phone] SAUL VELASQUEZ  [id number]     ORTHOPEDIC CONSULT    Diagnosis:  R femur dino-prosthetic fx    75yFemale     Patient is 75 year old Female from home lives with  with  PMHx of mild to moderate dementia and no significant PSHx presents to ED after slipping and having fall on stairs. Pt reports of landing on her right side so having Rt lower back and Rt hip pain from fall. Pt reportedly unable to get up secondary to the pain which is 9/10 and responds to pain medications. Pt states she was going up the steps when she slipped and fell. Pt reports her bathroom is upstairs so she use stairs several times a day. Pt reports to be very health all herlife.   Pt denies LOC, head injury, neck injury, chest pain, abd pain, weakness, paresthesias, bowel or urinary incontinence, or any other acute complaints.     GOC: pT said she never thought of these things. Full code for now    76 y/o F presents to the hospital s/p mechanical fall about 1 week ago. Pt states she was walking up the stairs at home and slipped and fell onto her R side. Denies hitting her head or any LOC. Pt states the pain worsened throughout the week causing her to use a walker to ambulate, otherwise she ambulates independently Pt states the pain is localized to the R hip and is 6/10- comes on only with movement. Denies any numbness/tingling. Of note- pt is s/p R LEONIE several years ago although does not recall exactly when or where.   Pt was seen ambulating in the room without a limp or any pain to the right hip.  Denies CP/SOB, dyspnea, paresthesias, N/V/D, palpitations.     Vital Signs Last 24 Hrs  T(C): 36.7 (16 Dec 2020 14:22), Max: 36.7 (15 Dec 2020 19:40)  T(F): 98 (16 Dec 2020 14:22), Max: 98.1 (16 Dec 2020 01:27)  HR: 69 (16 Dec 2020 14:22) (62 - 84)  BP: 117/88 (16 Dec 2020 14:22) (117/88 - 137/88)  BP(mean): --  RR: 18 (16 Dec 2020 14:22) (16 - 18)  SpO2: 98% (16 Dec 2020 14:22) (98% - 100%)  I&O's Detail      Physical Exam:    General:  NAD, resting comfortably in bed.    R Hip: No deformity, No erythema/ecchymosis, Skin is pink and warm, old incision from ELONIE well healed. Minimally tender to the lateral aspect of her hip. ROM of the hip- 0-90 degrees with minimal pain, able to IR and ER with minimal pain and able to straight leg raise. Negative log roll test, negative heel strike.   Lower extremity:  No calf tenderness, calves are soft. 2+pulses. NVI. (+)EHL/FHL/ADF/APF.  Good capillary refill. Warm, well-perfused. SILT.                           11.6   8.63  )-----------( 176      ( 16 Dec 2020 07:11 )             36.9     12-16    140  |  106  |  19<H>  ----------------------------<  115<H>  4.3   |  28  |  0.86    Ca    9.5      16 Dec 2020 07:11  Phos  3.4     12-16  Mg     2.1     12-16    TPro  6.4  /  Alb  3.1<L>  /  TBili  0.7  /  DBili  x   /  AST  16  /  ALT  12  /  AlkPhos  84  12-16    PT/INR - ( 15 Dec 2020 20:26 )   PT: 13.7 sec;   INR: 1.16 ratio         PTT - ( 15 Dec 2020 20:26 )  PTT:30.8 sec  Urinalysis Basic - ( 16 Dec 2020 02:26 )    Color: Yellow / Appearance: Clear / S.020 / pH: x  Gluc: x / Ketone: Small  / Bili: Negative / Urobili: Negative   Blood: x / Protein: 15 / Nitrite: Positive   Leuk Esterase: Trace / RBC: 10-25 /HPF / WBC 3-5 /HPF   Sq Epi: x / Non Sq Epi: Few /HPF / Bacteria: Few /HPF    CT scan: Status post right total hip arthroplasty. Mildly displaced acute right proximal femoral periprosthetic fracture in the intertrochanteric/subtrochanteric region.     Impression:  75yFemale with R Hip dino-prosthetic fx    Plan:  -  D/w - Recommends conservative management- TTWB to the RLE with assistive devices and followup with a Hip specialist  -  Pain control  -  Daily PT  -  DVT ppx  -  D/w Medical team  -  Follow-up with Dr. Deshpande in ONE WEEK at [phone]/pt is to follow up with a Hip specialist as well-

## 2020-12-16 NOTE — PHYSICAL THERAPY INITIAL EVALUATION ADULT - DIAGNOSIS, PT EVAL
Patient presented with pain(4/10) after pain medicine on R hip inhibiting AROM, impaired balance during ambulation

## 2020-12-16 NOTE — PROGRESS NOTE ADULT - ASSESSMENT
HPI:  Patient is 75 year old Female from home lives with  with  PMHx of mild to moderate dementia and no significant PSHx presents to ED after slipping and having fall on stairs. Pt reports of landing on her right side so having Rt lower back and Rt hip pain from fall. Pt states she was going up the steps when she slipped and fell.  Pt denies LOC, head injury, neck injury, chest pain, abd pain, weakness, paresthesias, bowel or urinary incontinence, or any other acute complaints.        Patient seen and examined at bedside, Right upper leg and hip tenderness on exam, denies pain while at rest. Denies chest pain, abd pain, weakness, paresthesias, bowel or urinary incontinence, or any other acute complaints.    HPI:  Patient is 75 year old Female from home lives with  with  PMHx of mild to moderate dementia and no significant PSHx presents to ED after slipping and having fall on stairs. Pt reports of landing on her right side so having Rt lower back and Rt hip pain from fall. Pt states she was going up the steps when she slipped and fell.  Patient admitted to medicine for mechanical fall and right hip pain. Head CT negative for bleeding or CVA, pelvis CT positive for  mildly displaced acute right proximal femoral periprosthetic fracture in the intertrochanteric/subtrochanteric region. Ortho consulted f/u recommendations. Pain management onboard, patient seen and evaluated by PT for discharge planning.    Patient seen and examined at bedside, Right upper leg and hip tenderness on exam, denies pain while at rest. Denies chest pain, abd pain, weakness, paresthesias, bowel or urinary incontinence, or any other acute complaints.

## 2020-12-16 NOTE — DIETITIAN INITIAL EVALUATION ADULT. - PERTINENT MEDS FT
MEDICATIONS  (STANDING):  acetaminophen   Tablet .. 1000 milliGRAM(s) Oral every 8 hours  dextrose 5%. 1000 milliLiter(s) (100 mL/Hr) IV Continuous <Continuous>  enoxaparin Injectable 40 milliGRAM(s) SubCutaneous daily  glucagon  Injectable 1 milliGRAM(s) IntraMuscular once  influenza   Vaccine 0.5 milliLiter(s) IntraMuscular once  insulin lispro (ADMELOG) corrective regimen sliding scale   SubCutaneous three times a day before meals  naproxen 375 milliGRAM(s) Oral every 8 hours  pantoprazole    Tablet 40 milliGRAM(s) Oral before breakfast  polyethylene glycol 3350 17 Gram(s) Oral daily  senna 2 Tablet(s) Oral at bedtime  sodium chloride 0.9%. 1000 milliLiter(s) (70 mL/Hr) IV Continuous <Continuous>    MEDICATIONS  (PRN):  oxyCODONE    IR 5 milliGRAM(s) Oral every 4 hours PRN Severe Pain (7 - 10)

## 2020-12-16 NOTE — CONSULT NOTE ADULT - ATTENDING COMMENTS
stable [place] A  no pain  protected wb 6 weeks  avoid pivoting    no surgery planned at this time

## 2020-12-16 NOTE — CONSULT NOTE ADULT - SUBJECTIVE AND OBJECTIVE BOX
Source of information: , Chart review, patient  Patient language: English  : n/a    CC: Patient is a 75y old  Female who presents with a chief complaint of fall (16 Dec 2020 11:19)      HPI:   Patient is 75 year old Female from home lives with  with  PMHx of mild to moderate dementia and no significant PSHx presents to ED after slipping and having fall on stairs. Pt reports of landing on her right side so having Rt lower back and Rt hip pain from fall. Pt reportedly unable to get up secondary to the pain which is 9/10 and responds to pain medications. Pt states she was going up the steps when she slipped and fell. Pt reports her bathroom is upstairs so she use stairs several times a day. Pt reports to be very health all herlife.   Pt denies LOC, head injury, neck injury, chest pain, abd pain, weakness, paresthesias, bowel or urinary incontinence, or any other acute complaints.     GOC: pT said she never thought of these things. Full code for now (15 Dec 2020 22:00)    75 year old female s/p fall.  Pt with right hip pain.  Pt is s/p right hip replacement years ago.  Pain is well controlled at rest but increases on exertion. Imaging shows Status post right total hip arthroplasty. Mildly displaced acute right proximal femoral periprosthetic fracture in the intertrochanteric/subtrochanteric region. T Pt seen by ortho is recommending conservative mgt.  Pt to be toe touch for PT.  No chest pain or sob.     PAIN SCORE:  5/10     SCALE USED: (1-10 VNRS)    PAST MEDICAL & SURGICAL HISTORY:  No pertinent past medical history    No significant past surgical history        FAMILY HISTORY:        SOCIAL HISTORY:  [x ] Denies Smoking, Alcohol, or Drug Use    Allergies    No Known Allergies    Intolerances        MEDICATIONS:    MEDICATIONS  (STANDING):  acetaminophen   Tablet .. 1000 milliGRAM(s) Oral every 8 hours  dextrose 5%. 1000 milliLiter(s) (100 mL/Hr) IV Continuous <Continuous>  enoxaparin Injectable 40 milliGRAM(s) SubCutaneous daily  glucagon  Injectable 1 milliGRAM(s) IntraMuscular once  influenza   Vaccine 0.5 milliLiter(s) IntraMuscular once  insulin lispro (ADMELOG) corrective regimen sliding scale   SubCutaneous three times a day before meals  naproxen 375 milliGRAM(s) Oral every 8 hours  pantoprazole    Tablet 40 milliGRAM(s) Oral before breakfast  polyethylene glycol 3350 17 Gram(s) Oral daily  senna 2 Tablet(s) Oral at bedtime  sodium chloride 0.9%. 1000 milliLiter(s) (70 mL/Hr) IV Continuous <Continuous>    MEDICATIONS  (PRN):  oxyCODONE    IR 5 milliGRAM(s) Oral every 4 hours PRN Severe Pain (7 - 10)      Vital Signs Last 24 Hrs  T(C): 36.7 (16 Dec 2020 14:22), Max: 36.7 (15 Dec 2020 19:40)  T(F): 98 (16 Dec 2020 14:22), Max: 98.1 (16 Dec 2020 01:27)  HR: 69 (16 Dec 2020 14:22) (62 - 84)  BP: 117/88 (16 Dec 2020 14:22) (117/88 - 142/81)  BP(mean): --  RR: 18 (16 Dec 2020 14:22) (16 - 18)  SpO2: 98% (16 Dec 2020 14:22) (94% - 100%)    LABS:                          11.6   8.63  )-----------( 176      ( 16 Dec 2020 07:11 )             36.9     12-16    140  |  106  |  19<H>  ----------------------------<  115<H>  4.3   |  28  |  0.86    Ca    9.5      16 Dec 2020 07:11  Phos  3.4     12-16  Mg     2.1     12-16    TPro  6.4  /  Alb  3.1<L>  /  TBili  0.7  /  DBili  x   /  AST  16  /  ALT  12  /  AlkPhos  84  12-16    PT/INR - ( 15 Dec 2020 20:26 )   PT: 13.7 sec;   INR: 1.16 ratio         PTT - ( 15 Dec 2020 20:26 )  PTT:30.8 sec  LIVER FUNCTIONS - ( 16 Dec 2020 07:11 )  Alb: 3.1 g/dL / Pro: 6.4 g/dL / ALK PHOS: 84 U/L / ALT: 12 U/L DA / AST: 16 U/L / GGT: x           Urinalysis Basic - ( 16 Dec 2020 02:26 )    Color: Yellow / Appearance: Clear / S.020 / pH: x  Gluc: x / Ketone: Small  / Bili: Negative / Urobili: Negative   Blood: x / Protein: 15 / Nitrite: Positive   Leuk Esterase: Trace / RBC: 10-25 /HPF / WBC 3-5 /HPF   Sq Epi: x / Non Sq Epi: Few /HPF / Bacteria: Few /HPF      CAPILLARY BLOOD GLUCOSE      POCT Blood Glucose.: 99 mg/dL (16 Dec 2020 12:00)  POCT Blood Glucose.: 102 mg/dL (16 Dec 2020 08:15)      REVIEW OF SYSTEMS:  CONSTITUTIONAL: No fever or fatigue  RESPIRATORY: No cough, wheezing, chills or hemoptysis; No shortness of breath  CARDIOVASCULAR: No chest pain, palpitations, dizziness, or leg swelling  GASTROINTESTINAL: No abdominal or epigastric pain. No nausea, vomiting; No diarrhea or constipation.   GENITOURINARY: No dysuria, frequency, hematuria, retention or incontinence  MUSCULOSKELETAL: No joint pain or swelling; No muscle, back, or extremity pain, no upper or lower motor strength weakness, no saddle anesthesia, bowel/bladder incontinence, no falls   NEURO: No weakness, no numbness   PSYCHIATRIC: No depression, anxiety, mood swings, or difficulty sleeping    PHYSICAL EXAM:  GENERAL:  Alert & Oriented X3, NAD, Good concentration  CHEST/LUNG: Clear to auscultation bilaterally; No rales, rhonchi, wheezing, or rubs  HEART: Regular rate and rhythm; No murmurs, rubs, or gallops  ABDOMEN: Soft, Nontender, Nondistended; Bowel sounds present  : no incontinence, no flank pain  EXTREMITIES:  2+ Peripheral Pulses, No cyanosis, or edema  MUSCULOSKELETAL: Motor Strength 5/5 B/L upper and lower extremities; moves all extremities equally against gravity; ROM intact; negative SRL  NEUROLOGICAL: awake and alert and oriented   SKIN: No rashes or lesions    Radiology:  < from: CT Pelvis Bony Only No Cont (.15.20 @ 22:36) >    EXAM:  CT PELVIS BONY ONLY                            PROCEDURE DATE:  12/15/2020          INTERPRETATION:  HISTORY: Right-sided hip fracture status post fall.    Helical CT imaging of the bony pelvis was performed without intravenous contrast. Sagittal and coronal reformats were provided. 3-D reformats were performed on a separate workstation.    Correlation is made with radiographs from the same day.    Findings:    Patient is status post right total hip arthroplasty with a noncemented femoral component. There is an acute mildly displaced periprosthetic fracture along the anterior cortex of the right femur in the intertrochanteric/subtrochanteric region. There is 0.9 cm of displacement of the dominant fracture fragment. Hardware appears otherwise intact. There is surrounding intramuscular myofascial edema about the fracture.    Chronic healed bilateral superior and inferior pubic rami fractures are noted.    Left hip joint space is preserved. 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 There is lower lumbar spondylosis.    Aspect uterine fibroids are noted. There is colonic diverticulosis.    Impression:    Status post right total hip arthroplasty. Mildly displaced acute right proximal femoral periprosthetic fracture in the intertrochanteric/subtrochanteric region. This change to the preliminary report was discussed with nurse practitioner Jerry on 2020 at 0836 hours.    < end of copied text >      Drug Screen:  enoxaparin Injectable 40 milliGRAM(s) SubCutaneous daily          ORT Score   Family Hx of substance abuse	Female	Male  Alcohol 	                                              1              3  Illegal drugs	                                      2              3  Rx drugs                                               4	      4    Personal Hx of substance abuse		  Alcohol 	                                               3	      3  Illegal drugs                                  	       4	      4  Rx drugs                                                5	      5  Age between 16- 45 years	               1             1  hx preadolescent sexual abuse	       3	      0  Psychological disease		  ADD, OCD, bipolar, schizophrenia	2	      2  Depression                                    	1	      1  Score totals 		  		  a score of 3 or lower indicates low risk for opioid abuse		  a score of 4-7 indicates moderate risk for opioid abuse		  a score of 8 or higher indicates high risk for opioid abuse	    Risk factors associated with adverse outcomes related to opioid treatment  [ ]  Concurrent benzodiazepine use  [ ]  History/ Active substance use or alcohol use disorder  [ ] Psychiatric co-morbidity  [ ] Sleep apnea  [ ] COPD  [ ] BMI> 35  [ ] Liver dysfunction  [ ] Renal dysfunction  [ ] CHF  [ ] Smoker  [x ]  Age > 60 years      [x ]  [facility]  Reviewed and Copied to Chart. See below.
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
## 2020-12-17 LAB
ALBUMIN SERPL ELPH-MCNC: 3 G/DL — LOW (ref 3.5–5)
ALP SERPL-CCNC: 79 U/L — SIGNIFICANT CHANGE UP (ref 40–120)
ALT FLD-CCNC: 12 U/L DA — SIGNIFICANT CHANGE UP (ref 10–60)
ANION GAP SERPL CALC-SCNC: 6 MMOL/L — SIGNIFICANT CHANGE UP (ref 5–17)
AST SERPL-CCNC: 9 U/L — LOW (ref 10–40)
BILIRUB SERPL-MCNC: 0.6 MG/DL — SIGNIFICANT CHANGE UP (ref 0.2–1.2)
BUN SERPL-MCNC: 18 MG/DL — SIGNIFICANT CHANGE UP (ref 7–18)
CALCIUM SERPL-MCNC: 9 MG/DL — SIGNIFICANT CHANGE UP (ref 8.4–10.5)
CHLORIDE SERPL-SCNC: 106 MMOL/L — SIGNIFICANT CHANGE UP (ref 96–108)
CO2 SERPL-SCNC: 28 MMOL/L — SIGNIFICANT CHANGE UP (ref 22–31)
CREAT SERPL-MCNC: 0.74 MG/DL — SIGNIFICANT CHANGE UP (ref 0.5–1.3)
GLUCOSE BLDC GLUCOMTR-MCNC: 112 MG/DL — HIGH (ref 70–99)
GLUCOSE BLDC GLUCOMTR-MCNC: 86 MG/DL — SIGNIFICANT CHANGE UP (ref 70–99)
GLUCOSE BLDC GLUCOMTR-MCNC: 90 MG/DL — SIGNIFICANT CHANGE UP (ref 70–99)
GLUCOSE BLDC GLUCOMTR-MCNC: 93 MG/DL — SIGNIFICANT CHANGE UP (ref 70–99)
GLUCOSE SERPL-MCNC: 82 MG/DL — SIGNIFICANT CHANGE UP (ref 70–99)
HCT VFR BLD CALC: 35.4 % — SIGNIFICANT CHANGE UP (ref 34.5–45)
HGB BLD-MCNC: 11.3 G/DL — LOW (ref 11.5–15.5)
MCHC RBC-ENTMCNC: 27.5 PG — SIGNIFICANT CHANGE UP (ref 27–34)
MCHC RBC-ENTMCNC: 31.9 GM/DL — LOW (ref 32–36)
MCV RBC AUTO: 86.1 FL — SIGNIFICANT CHANGE UP (ref 80–100)
NRBC # BLD: 0 /100 WBCS — SIGNIFICANT CHANGE UP (ref 0–0)
PLATELET # BLD AUTO: 159 K/UL — SIGNIFICANT CHANGE UP (ref 150–400)
POTASSIUM SERPL-MCNC: 3.7 MMOL/L — SIGNIFICANT CHANGE UP (ref 3.5–5.3)
POTASSIUM SERPL-SCNC: 3.7 MMOL/L — SIGNIFICANT CHANGE UP (ref 3.5–5.3)
PROT SERPL-MCNC: 6.1 G/DL — SIGNIFICANT CHANGE UP (ref 6–8.3)
RBC # BLD: 4.11 M/UL — SIGNIFICANT CHANGE UP (ref 3.8–5.2)
RBC # FLD: 14 % — SIGNIFICANT CHANGE UP (ref 10.3–14.5)
SARS-COV-2 IGG SERPL QL IA: NEGATIVE — SIGNIFICANT CHANGE UP
SARS-COV-2 IGM SERPL IA-ACNC: <0.1 INDEX — SIGNIFICANT CHANGE UP
SODIUM SERPL-SCNC: 140 MMOL/L — SIGNIFICANT CHANGE UP (ref 135–145)
WBC # BLD: 7.5 K/UL — SIGNIFICANT CHANGE UP (ref 3.8–10.5)
WBC # FLD AUTO: 7.5 K/UL — SIGNIFICANT CHANGE UP (ref 3.8–10.5)

## 2020-12-17 PROCEDURE — 99221 1ST HOSP IP/OBS SF/LOW 40: CPT

## 2020-12-17 PROCEDURE — 99232 SBSQ HOSP IP/OBS MODERATE 35: CPT

## 2020-12-17 RX ADMIN — Medication 375 MILLIGRAM(S): at 05:46

## 2020-12-17 RX ADMIN — PANTOPRAZOLE SODIUM 40 MILLIGRAM(S): 20 TABLET, DELAYED RELEASE ORAL at 05:47

## 2020-12-17 RX ADMIN — OXYCODONE HYDROCHLORIDE 5 MILLIGRAM(S): 5 TABLET ORAL at 10:16

## 2020-12-17 RX ADMIN — Medication 1000 MILLIGRAM(S): at 14:00

## 2020-12-17 RX ADMIN — Medication 375 MILLIGRAM(S): at 14:00

## 2020-12-17 RX ADMIN — ENOXAPARIN SODIUM 40 MILLIGRAM(S): 100 INJECTION SUBCUTANEOUS at 11:30

## 2020-12-17 RX ADMIN — Medication 375 MILLIGRAM(S): at 21:22

## 2020-12-17 RX ADMIN — Medication 1000 MILLIGRAM(S): at 13:16

## 2020-12-17 RX ADMIN — POLYETHYLENE GLYCOL 3350 17 GRAM(S): 17 POWDER, FOR SOLUTION ORAL at 11:30

## 2020-12-17 RX ADMIN — SENNA PLUS 2 TABLET(S): 8.6 TABLET ORAL at 21:23

## 2020-12-17 RX ADMIN — Medication 1000 MILLIGRAM(S): at 05:46

## 2020-12-17 RX ADMIN — Medication 375 MILLIGRAM(S): at 13:17

## 2020-12-17 RX ADMIN — Medication 1000 MILLIGRAM(S): at 21:22

## 2020-12-17 RX ADMIN — Medication 1000 MILLIGRAM(S): at 08:04

## 2020-12-17 RX ADMIN — Medication 375 MILLIGRAM(S): at 08:04

## 2020-12-17 RX ADMIN — OXYCODONE HYDROCHLORIDE 5 MILLIGRAM(S): 5 TABLET ORAL at 09:27

## 2020-12-17 NOTE — PROGRESS NOTE ADULT - SUBJECTIVE AND OBJECTIVE BOX
Source of information: , Chart review  Patient language: English  : n/a    CC:      This is a 75y old  Female who presents with a chief complaint of fall (17 Dec 2020 09:05)  Patient is 75 year old Female from home lives with  with  PMHx of mild to moderate dementia and no significant PSHx presents to ED after slipping and having fall on stairs. Pt reports of landing on her right side so having Rt lower back and Rt hip pain from fall. Pt reportedly unable to get up secondary to the pain which is 9/10 and responds to pain medications. Pt states she was going up the steps when she slipped and fell. Pt reports her bathroom is upstairs so she use stairs several times a day. Pt reports to be very health all herlife.   Pt denies LOC, head injury, neck injury, chest pain, abd pain, weakness, paresthesias, bowel or urinary incontinence, or any other acute complaints.     patient seen and evaluated at bedside, lying supine in bed, in NAD; denies pain at rest, verbalized mild pain with exertion. Imaging shows Status post right total hip arthroplasty. Mildly displaced acute right proximal femoral periprosthetic fracture in the intertrochanteric/subtrochanteric region. Pt seen by ortho, recommending conservative mgt.  Pt to be toe touch for PT.  Patient stated goal for pain control: to be able to take deep breaths, get out of bed to chair and ambulate with tolerable pain control.     PAIN SCORE: 1  SCALE USED: (1-10 NRS)      PAST MEDICAL & SURGICAL HISTORY:  No pertinent past medical history    No significant past surgical history        FAMILY HISTORY:        SOCIAL HISTORY:  [ ] Denies Smoking, Alcohol, or Drug Use    Allergies    No Known Allergies    Intolerances        MEDICATIONS:    MEDICATIONS  (STANDING):  acetaminophen   Tablet .. 1000 milliGRAM(s) Oral every 8 hours  enoxaparin Injectable 40 milliGRAM(s) SubCutaneous daily  influenza   Vaccine 0.5 milliLiter(s) IntraMuscular once  naproxen 375 milliGRAM(s) Oral every 8 hours  pantoprazole    Tablet 40 milliGRAM(s) Oral before breakfast  polyethylene glycol 3350 17 Gram(s) Oral daily  senna 2 Tablet(s) Oral at bedtime  sodium chloride 0.9%. 1000 milliLiter(s) (70 mL/Hr) IV Continuous <Continuous>    MEDICATIONS  (PRN):  oxyCODONE    IR 5 milliGRAM(s) Oral every 4 hours PRN Severe Pain (7 - 10)      Vital Signs Last 24 Hrs  T(C): 36.4 (17 Dec 2020 05:24), Max: 36.7 (16 Dec 2020 14:22)  T(F): 97.5 (17 Dec 2020 05:24), Max: 98 (16 Dec 2020 14:22)  HR: 59 (17 Dec 2020 09:17) (54 - 76)  BP: 150/75 (17 Dec 2020 09:17) (117/88 - 159/80)  BP(mean): --  RR: 17 (17 Dec 2020 05:24) (17 - 18)  SpO2: 97% (17 Dec 2020 09:17) (94% - 98%)    LABS:                          11.3   7.50  )-----------( 159      ( 17 Dec 2020 06:21 )             35.4     12-17    140  |  106  |  18  ----------------------------<  82  3.7   |  28  |  0.74    Ca    9.0      17 Dec 2020 06:21  Phos  3.4     12-16  Mg     2.1     12-16    TPro  6.1  /  Alb  3.0<L>  /  TBili  0.6  /  DBili  x   /  AST  9<L>  /  ALT  12  /  AlkPhos  79  12-17    PT/INR - ( 15 Dec 2020 20:26 )   PT: 13.7 sec;   INR: 1.16 ratio         PTT - ( 15 Dec 2020 20:26 )  PTT:30.8 sec  LIVER FUNCTIONS - ( 17 Dec 2020 06:21 )  Alb: 3.0 g/dL / Pro: 6.1 g/dL / ALK PHOS: 79 U/L / ALT: 12 U/L DA / AST: 9 U/L / GGT: x           Urinalysis Basic - ( 16 Dec 2020 02:26 )    Color: Yellow / Appearance: Clear / S.020 / pH: x  Gluc: x / Ketone: Small  / Bili: Negative / Urobili: Negative   Blood: x / Protein: 15 / Nitrite: Positive   Leuk Esterase: Trace / RBC: 10-25 /HPF / WBC 3-5 /HPF   Sq Epi: x / Non Sq Epi: Few /HPF / Bacteria: Few /HPF      CAPILLARY BLOOD GLUCOSE      POCT Blood Glucose.: 90 mg/dL (17 Dec 2020 10:57)  POCT Blood Glucose.: 86 mg/dL (17 Dec 2020 08:02)  POCT Blood Glucose.: 114 mg/dL (16 Dec 2020 21:24)  POCT Blood Glucose.: 92 mg/dL (16 Dec 2020 17:06)      Radiology:    EXAM:  CT PELVIS BONY ONLY                            PROCEDURE DATE:  12/15/2020          INTERPRETATION:  HISTORY: Right-sided hip fracture status post fall.    Helical CT imaging of the bony pelvis was performed without intravenous contrast. Sagittal and coronal reformats were provided. 3-D reformats were performed on a separate workstation.    Correlation is made with radiographs from the same day.    Findings:    Patient is status post right total hip arthroplasty with a noncemented femoral component. There is an acute mildly displaced periprosthetic fracture along the anterior cortex of the right femur in the intertrochanteric/subtrochanteric region. There is 0.9 cm of displacement of the dominant fracture fragment. Hardware appears otherwise intact. There is surrounding intramuscular myofascial edema about the fracture.    Chronic healed bilateral superior and inferior pubic rami fractures are noted.    Left hip joint space is preserved. 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 There is lower lumbar spondylosis.    Aspect uterine fibroids are noted. There is colonic diverticulosis.    Impression:    Status post right total hip arthroplasty. Mildly displaced acute right proximal femoral periprosthetic fracture in the intertrochanteric/subtrochanteric region. This change to the preliminary report was discussed with nurse practitioner Jerry on 2020 at 0836 hours.            GHAZAL PARTIDA MD; Attending Radiologist  This document has been electronically signed. Dec 16 2020  8:37AM    Drug Screen:        REVIEW OF SYSTEMS:  CONSTITUTIONAL: No fever or fatigue  RESPIRATORY: No cough, wheezing, chills or hemoptysis; No shortness of breath  CARDIOVASCULAR: No chest pain, palpitations, dizziness, or leg swelling  GASTROINTESTINAL: No abdominal or epigastric pain. No nausea, vomiting; No diarrhea or constipation.   GENITOURINARY: No dysuria, frequency, hematuria, retention or incontinence  MUSCULOSKELETAL: No joint pain or swelling; No muscle, back, or extremity pain, no upper or lower motor strength weakness, no saddle anesthesia, bowel/bladder incontinence, no falls   NEURO: No headaches, No numbness/tingling b/l LE, No weakness  PSYCHIATRIC: No depression, anxiety, mood swings, or difficulty sleeping    PHYSICAL EXAM:  GENERAL:  Alert & Oriented X3, NAD,   CHEST/LUNG: Clear to auscultation bilaterally; No rales, rhonchi, wheezing, or rubs  HEART: Regular rate and rhythm; No murmurs, rubs, or gallops  ABDOMEN: Soft, Nontender, Nondistended; Bowel sounds present  EXTREMITIES:  2+ Peripheral Pulses, No cyanosis, or edema  MUSCULOSKELETAL: + decreased rom Motor Strength 5/5 B/L upper and lower extremities; moves all extremities equally against gravity; ROM intact; negative SLR  SKIN: No rashes or lesions    Risk factors associated with adverse outcomes related to opioid treatment  [ ]  Concurrent benzodiazepine use  [ ]  History/ Active substance use or alcohol use disorder  [ ] Psychiatric co-morbidity  [ ] Sleep apnea  [ ] COPD  [ ] BMI> 35  [ ] Liver dysfunction  [ ] Renal dysfunction  [ ] CHF  [ ] Smoker  [X ]  Age > 60 years    [ ]  [place]  Reviewed and Copied to Chart. See below.    Plan of care and goal oriented pain management treatment options were discussed with patient and /or primary care giver; all questions and concerns were addressed and care was aligned with patient's wishes.    Educated patient on goal oriented pain management treatment options     20 @ 12:42 Source of information: , Chart review  Patient language: English  : n/a    CC:      This is a 75y old  Female who presents with a chief complaint of fall (17 Dec 2020 09:05)  Patient is 75 year old Female from home lives with  with  PMHx of mild to moderate dementia and no significant PSHx presents to ED after slipping and having fall on stairs. Pt reports of landing on her right side so having Rt lower back and Rt hip pain from fall. Pt reportedly unable to get up secondary to the pain which is 9/10 and responds to pain medications. Pt states she was going up the steps when she slipped and fell. Pt reports her bathroom is upstairs so she use stairs several times a day. Pt reports to be very health all herlife.   Pt denies LOC, head injury, neck injury, chest pain, abd pain, weakness, paresthesias, bowel or urinary incontinence, or any other acute complaints.     patient seen and evaluated at bedside, lying supine in bed, in NAD; denies pain at rest, verbalized mild pain with exertion. Imaging shows Status post right total hip arthroplasty. Mildly displaced acute right proximal femoral periprosthetic fracture in the intertrochanteric/subtrochanteric region. Pt seen by ortho, recommending conservative mgt.  Pt to be toe touch for PT.  Patient stated goal for pain control: to be able to take deep breaths, get out of bed to chair and ambulate with tolerable pain control.     PAIN SCORE: 1  SCALE USED: (1-10 NRS)      PAST MEDICAL & SURGICAL HISTORY:  No pertinent past medical history    No significant past surgical history        FAMILY HISTORY:        SOCIAL HISTORY:  [x ] Denies Smoking, Alcohol, or Drug Use    Allergies    No Known Allergies    Intolerances        MEDICATIONS:    MEDICATIONS  (STANDING):  acetaminophen   Tablet .. 1000 milliGRAM(s) Oral every 8 hours  enoxaparin Injectable 40 milliGRAM(s) SubCutaneous daily  influenza   Vaccine 0.5 milliLiter(s) IntraMuscular once  naproxen 375 milliGRAM(s) Oral every 8 hours  pantoprazole    Tablet 40 milliGRAM(s) Oral before breakfast  polyethylene glycol 3350 17 Gram(s) Oral daily  senna 2 Tablet(s) Oral at bedtime  sodium chloride 0.9%. 1000 milliLiter(s) (70 mL/Hr) IV Continuous <Continuous>    MEDICATIONS  (PRN):  oxyCODONE    IR 5 milliGRAM(s) Oral every 4 hours PRN Severe Pain (7 - 10)      Vital Signs Last 24 Hrs  T(C): 36.4 (17 Dec 2020 05:24), Max: 36.7 (16 Dec 2020 14:22)  T(F): 97.5 (17 Dec 2020 05:24), Max: 98 (16 Dec 2020 14:22)  HR: 59 (17 Dec 2020 09:17) (54 - 76)  BP: 150/75 (17 Dec 2020 09:17) (117/88 - 159/80)  BP(mean): --  RR: 17 (17 Dec 2020 05:24) (17 - 18)  SpO2: 97% (17 Dec 2020 09:17) (94% - 98%)    LABS:                          11.3   7.50  )-----------( 159      ( 17 Dec 2020 06:21 )             35.4     12-17    140  |  106  |  18  ----------------------------<  82  3.7   |  28  |  0.74    Ca    9.0      17 Dec 2020 06:21  Phos  3.4     12-16  Mg     2.1     12-16    TPro  6.1  /  Alb  3.0<L>  /  TBili  0.6  /  DBili  x   /  AST  9<L>  /  ALT  12  /  AlkPhos  79  12-17    PT/INR - ( 15 Dec 2020 20:26 )   PT: 13.7 sec;   INR: 1.16 ratio         PTT - ( 15 Dec 2020 20:26 )  PTT:30.8 sec  LIVER FUNCTIONS - ( 17 Dec 2020 06:21 )  Alb: 3.0 g/dL / Pro: 6.1 g/dL / ALK PHOS: 79 U/L / ALT: 12 U/L DA / AST: 9 U/L / GGT: x           Urinalysis Basic - ( 16 Dec 2020 02:26 )    Color: Yellow / Appearance: Clear / S.020 / pH: x  Gluc: x / Ketone: Small  / Bili: Negative / Urobili: Negative   Blood: x / Protein: 15 / Nitrite: Positive   Leuk Esterase: Trace / RBC: 10-25 /HPF / WBC 3-5 /HPF   Sq Epi: x / Non Sq Epi: Few /HPF / Bacteria: Few /HPF      CAPILLARY BLOOD GLUCOSE      POCT Blood Glucose.: 90 mg/dL (17 Dec 2020 10:57)  POCT Blood Glucose.: 86 mg/dL (17 Dec 2020 08:02)  POCT Blood Glucose.: 114 mg/dL (16 Dec 2020 21:24)  POCT Blood Glucose.: 92 mg/dL (16 Dec 2020 17:06)      Radiology:    EXAM:  CT PELVIS BONY ONLY                            PROCEDURE DATE:  12/15/2020          INTERPRETATION:  HISTORY: Right-sided hip fracture status post fall.    Helical CT imaging of the bony pelvis was performed without intravenous contrast. Sagittal and coronal reformats were provided. 3-D reformats were performed on a separate workstation.    Correlation is made with radiographs from the same day.    Findings:    Patient is status post right total hip arthroplasty with a noncemented femoral component. There is an acute mildly displaced periprosthetic fracture along the anterior cortex of the right femur in the intertrochanteric/subtrochanteric region. There is 0.9 cm of displacement of the dominant fracture fragment. Hardware appears otherwise intact. There is surrounding intramuscular myofascial edema about the fracture.    Chronic healed bilateral superior and inferior pubic rami fractures are noted.    Left hip joint space is preserved. 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 There is lower lumbar spondylosis.    Aspect uterine fibroids are noted. There is colonic diverticulosis.    Impression:    Status post right total hip arthroplasty. Mildly displaced acute right proximal femoral periprosthetic fracture in the intertrochanteric/subtrochanteric region. This change to the preliminary report was discussed with nurse practitioner Jerry on 2020 at 0836 hours.            GHAZAL PARTIDA MD; Attending Radiologist  This document has been electronically signed. Dec 16 2020  8:37AM    Drug Screen:        REVIEW OF SYSTEMS:  CONSTITUTIONAL: No fever or fatigue  RESPIRATORY: No cough, wheezing, chills or hemoptysis; No shortness of breath  CARDIOVASCULAR: No chest pain, palpitations, dizziness, or leg swelling  GASTROINTESTINAL: No abdominal or epigastric pain. No nausea, vomiting; No diarrhea or constipation.   GENITOURINARY: No dysuria, frequency, hematuria, retention or incontinence  MUSCULOSKELETAL: No joint pain or swelling; No muscle, back, or extremity pain, no upper or lower motor strength weakness, no saddle anesthesia, bowel/bladder incontinence, no falls   NEURO: No headaches, No numbness/tingling b/l LE, No weakness  PSYCHIATRIC: No depression, anxiety, mood swings, or difficulty sleeping    PHYSICAL EXAM:  GENERAL:  Alert & Oriented X3, NAD,   CHEST/LUNG: Clear to auscultation bilaterally; No rales, rhonchi, wheezing, or rubs  HEART: Regular rate and rhythm; No murmurs, rubs, or gallops  ABDOMEN: Soft, Nontender, Nondistended; Bowel sounds present  EXTREMITIES:  2+ Peripheral Pulses, No cyanosis, or edema  MUSCULOSKELETAL: + decreased rom Motor Strength 5/5 B/L upper and lower extremities; moves all extremities equally against gravity; ROM intact; negative SLR  SKIN: No rashes or lesions    Risk factors associated with adverse outcomes related to opioid treatment  [ ]  Concurrent benzodiazepine use  [ ]  History/ Active substance use or alcohol use disorder  [ ] Psychiatric co-morbidity  [ ] Sleep apnea  [ ] COPD  [ ] BMI> 35  [ ] Liver dysfunction  [ ] Renal dysfunction  [ ] CHF  [ ] Smoker  [X ]  Age > 60 years    [ ]  [place]  Reviewed and Copied to Chart. See below.    Plan of care and goal oriented pain management treatment options were discussed with patient and /or primary care giver; all questions and concerns were addressed and care was aligned with patient's wishes.    Educated patient on goal oriented pain management treatment options     20 @ 12:42

## 2020-12-17 NOTE — PROGRESS NOTE ADULT - PROBLEM SELECTOR PLAN 1
Imaging shows Status post right total hip arthroplasty. Mildly displaced acute right proximal femoral periprosthetic fracture in the intertrochanteric/subtrochanteric region. No surgical intervention at this moment, conservative therapy. Toe touch weight bearing.   Nonopioid Recommendations  - mild pain - nonpharm recommendations  - Acetaminophen 1 gram po q 8 for 3 days  - Naproxen 375mg po q 8 hours atc for 3 days  Opioid Recommendation  - Oxycodone 5mg po q 4 hours prn  Bowel Regimen   - Senna  - Miralax  OOB/PT - toe touch. Imaging shows Status post right total hip arthroplasty. Mildly displaced acute right proximal femoral periprosthetic fracture in the intertrochanteric/subtrochanteric region. No surgical intervention at this moment, conservative therapy. Toe touch weight bearing. Pt to follow up with Dr. Deshpande.  Hip specialist - Dr. marroquin - 95-25  Nonopioid Recommendations  - mild pain - nonpharm recommendations  - Acetaminophen 1 gram po q 8 for 3 days  - Naproxen 375mg po q 8 hours atc for 3 days  Opioid Recommendation  - Oxycodone 5mg po q 4 hours prn  Bowel Regimen   - Senna  - Miralax  OOB/PT - toe touch.

## 2020-12-17 NOTE — PROGRESS NOTE ADULT - PROBLEM SELECTOR PLAN 1
Patient s/p mechanical fall with pain to right hip and tight.   CT pelvis-  acute mildly displaced periprosthetic fracture along the anterior cortex of the right femur in the intertrochanteric/subtrochanteric region. There is 0.9 cm of displacement of the dominant fracture fragment.  Ortho consulted, f/u recommendations.  pain mgnt. onboard.   PT consulted with d[facility]c [facility]

## 2020-12-17 NOTE — PROGRESS NOTE ADULT - PROBLEM SELECTOR PLAN 5
Discussed with pt daughter Kelsey Anthony who is a healthcare proxy  full code for now.
Discussed with pt daughter Kelsey Anthony who is a healthcare proxy  full code for now.

## 2020-12-17 NOTE — PROGRESS NOTE ADULT - PROBLEM SELECTOR PLAN 4
RISK                                                          Points  [] Previous VTE                                           3  [] Thrombophilia                                        2  [] Lower limb paralysis                              2   [] Current Cancer                                       2   [x] Immobilization > 24 hrs                        1  [] ICU/CCU stay > 24 hours                       1  [x] Age > 60                                                   1  sc Lovenox.
RISK                                                          Points  [] Previous VTE                                           3  [] Thrombophilia                                        2  [] Lower limb paralysis                              2   [] Current Cancer                                       2   [x] Immobilization > 24 hrs                        1  [] ICU/CCU stay > 24 hours                       1  [x] Age > 60                                                   1  sc Lovenox.

## 2020-12-17 NOTE — PROGRESS NOTE ADULT - ASSESSMENT
HPI:  Patient is 75 year old Female from home lives with  with  PMHx of mild to moderate dementia and no significant PSHx presents to ED after slipping and having fall on stairs. Pt reports of landing on her right side so having Rt lower back and Rt hip pain from fall. Pt states she was going up the steps when she slipped and fell.  Patient admitted to medicine for mechanical fall and right hip pain. Head CT negative for bleeding or CVA, pelvis CT positive for  mildly displaced acute right proximal femoral periprosthetic fracture in the intertrochanteric/subtrochanteric region. Ortho consulted f/u recommendations. Pain management onboard, patient seen and evaluated by PT for discharge planning.    Patient seen and examined at bedside, Right upper leg and hip tenderness on exam, denies pain while at rest. Denies chest pain, abd pain, weakness, paresthesias, bowel or urinary incontinence, or any other acute complaints. Patient evaluated by PT with recommendation for short term rehab. FLOR[facility]INO onboard pending approval.

## 2020-12-17 NOTE — PROGRESS NOTE ADULT - ASSESSMENT
a Detail Level: Printer-Friendly View Extended View  Confidential Drug Utilization Report  Search Terms: shaneka anthony, 1945Search Date: 12/16/2020 16:17:17 PM  The Drug Utilization Report below displays all of the controlled substance prescriptions, if any, that your patient has filled in the last twelve months. The information displayed on this report is compiled from pharmacy submissions to the Department, and accurately reflects the information as submitted by the pharmacies.    This report was requested by: Winter Barker | Reference #: [id number]    Others' Prescriptions  Patient Name: Shaneka AnthonyBirth Date: 1945  Address: [place], [place] [address]Xwn: Female  Rx Written	Rx Dispensed	Drug	Quantity	Days Supply	Prescriber Name	Payment Method	Dispenser  01/15/2020	01/19/2020	oxycodone-acetaminophen 5-325 mg tab	60	10	Letha Campbell	Insurance	[facility]  01/16/2020	01/16/2020	oxycodone-acetaminophen 5-325 mg tab	30	5	Samuel Cordova MD	[facility]  * - Drugs marked with an asterisk are compound drugs. If the compound drug is made up of more than one controlled substance, then each controlled

## 2020-12-17 NOTE — PROGRESS NOTE ADULT - PROBLEM SELECTOR PLAN 2
Patient presents s/p fall associated with no LOC, headache, dizziness, chest pain, SOB.  likely mechanical,   CT head Non-contrast :  no acute hemorrhage or stroke  CT    Fall Precaution   f/u PT consult  f/u Nutrition consult.
Patient presents s/p fall associated with no LOC, headache, dizziness, chest pain, SOB.  likely mechanical,   CT head Non-contrast :  no acute hemorrhage or stroke  CT    Fall Precaution   f/u PT consult  f/u Nutrition consult.

## 2020-12-17 NOTE — PROGRESS NOTE ADULT - ATTENDING COMMENTS
continues to c/o R hip pain and R thigh / knee pain with movement.  Not present at rest.  No other concerns at this time.  Hopefuly to leave the hospital soon.    On exam, had good ROM @ R hip and knee, with movement limited by pain.  strength preserved    continue conservative management of her R periprosthetic femur fracture.  ACP will determine primary hip surgeon, and reach out for recommendation as to whether they want to follow up in their office, or if other hip specialist is preferred for ongoing care of this injury.    stop ROS wright hypoglycemia protocol.  Continue pain management.  Dispo once NIURKA is identified.
Patient seen and examined.  Case discussed with NP.  Agree with above as edited.   Patient is a 75yoF admitted with mildly displaced right proximal femoral dino-prosthetic fracture after mechanical fall.  d/w ortho - appreciate recs - no surgical intervention  PT recs NIURKA  d/w pain management NP  Case d/w Case management, social work and nursing on IDRs

## 2020-12-17 NOTE — PROGRESS NOTE ADULT - PROBLEM SELECTOR PLAN 6
d/c home or [facility]  pending ortho recommendations  pending PT recommendations.
d/c Rehab facility pending approval  sw onboard  no surgical intervention as per ortho team  PT recommendations for rehab in facility

## 2020-12-17 NOTE — PROGRESS NOTE ADULT - PROBLEM SELECTOR PLAN 3
Pt after fall landed on her right side   reports pain in rt side of back , rt knee and thigh area  f/u x-ray official report  pain management with tylenol, morphine , hydromorphone  bowel regimen.
Pt after fall landed on her right side   reports pain in rt side of back , rt knee and thigh area  f/u x-ray official report  pain management with tylenol, morphine , hydromorphone  bowel regimen.

## 2020-12-17 NOTE — PROGRESS NOTE ADULT - SUBJECTIVE AND OBJECTIVE BOX
patient doing well denies pain    exam  no pain to rom flex/ir/er  no pain to log roll or heel string  incision intact  minimal tender to palp  nv intact    AP  stable [place] A    rec pwb RLE 6 weeks total  no intervention planned

## 2020-12-18 VITALS — DIASTOLIC BLOOD PRESSURE: 90 MMHG | HEART RATE: 80 BPM | SYSTOLIC BLOOD PRESSURE: 151 MMHG

## 2020-12-18 PROCEDURE — 99239 HOSP IP/OBS DSCHRG MGMT >30: CPT

## 2020-12-18 PROCEDURE — 99231 SBSQ HOSP IP/OBS SF/LOW 25: CPT

## 2020-12-18 RX ORDER — SENNA PLUS 8.6 MG/1
2 TABLET ORAL
Qty: 28 | Refills: 0
Start: 2020-12-18 | End: 2020-12-31

## 2020-12-18 RX ORDER — OXYCODONE HYDROCHLORIDE 5 MG/1
1 TABLET ORAL
Qty: 12 | Refills: 0
Start: 2020-12-18 | End: 2020-12-20

## 2020-12-18 RX ORDER — CEFTRIAXONE 500 MG/1
1000 INJECTION, POWDER, FOR SOLUTION INTRAMUSCULAR; INTRAVENOUS EVERY 24 HOURS
Refills: 0 | Status: DISCONTINUED | OUTPATIENT
Start: 2020-12-18 | End: 2020-12-18

## 2020-12-18 RX ORDER — ACETAMINOPHEN 500 MG
2 TABLET ORAL
Qty: 30 | Refills: 0
Start: 2020-12-18 | End: 2020-12-22

## 2020-12-18 RX ADMIN — INFLUENZA VIRUS VACCINE 0.5 MILLILITER(S): 15; 15; 15; 15 SUSPENSION INTRAMUSCULAR at 13:35

## 2020-12-18 RX ADMIN — Medication 1000 MILLIGRAM(S): at 13:06

## 2020-12-18 RX ADMIN — POLYETHYLENE GLYCOL 3350 17 GRAM(S): 17 POWDER, FOR SOLUTION ORAL at 11:14

## 2020-12-18 RX ADMIN — Medication 375 MILLIGRAM(S): at 13:06

## 2020-12-18 RX ADMIN — Medication 375 MILLIGRAM(S): at 03:11

## 2020-12-18 RX ADMIN — Medication 1000 MILLIGRAM(S): at 03:11

## 2020-12-18 RX ADMIN — PANTOPRAZOLE SODIUM 40 MILLIGRAM(S): 20 TABLET, DELAYED RELEASE ORAL at 05:16

## 2020-12-18 RX ADMIN — Medication 1000 MILLIGRAM(S): at 05:16

## 2020-12-18 RX ADMIN — Medication 1000 MILLIGRAM(S): at 14:08

## 2020-12-18 RX ADMIN — ENOXAPARIN SODIUM 40 MILLIGRAM(S): 100 INJECTION SUBCUTANEOUS at 11:14

## 2020-12-18 RX ADMIN — Medication 375 MILLIGRAM(S): at 14:08

## 2020-12-18 RX ADMIN — Medication 375 MILLIGRAM(S): at 05:16

## 2020-12-18 RX ADMIN — CEFTRIAXONE 100 MILLIGRAM(S): 500 INJECTION, POWDER, FOR SOLUTION INTRAMUSCULAR; INTRAVENOUS at 11:14

## 2020-12-18 NOTE — PROGRESS NOTE ADULT - PROBLEM SELECTOR PROBLEM 2
Hip injury, right, initial encounter
Hip injury, right, initial encounter
Fall
Fall at home, initial encounter

## 2020-12-18 NOTE — PROGRESS NOTE ADULT - PROBLEM SELECTOR PROBLEM 3
Periprosthetic fracture of hip, initial encounter
Back pain
Periprosthetic fracture of hip, initial encounter
Back pain

## 2020-12-18 NOTE — DISCHARGE NOTE PROVIDER - NSFOLLOWUPCLINICS_GEN_ALL_ED_FT
[facility]  Orthopedics  [address] [place] [address]  Phone: [phone]  Fax: [phone]  Follow Up Time:

## 2020-12-18 NOTE — PROGRESS NOTE ADULT - ASSESSMENT
a Detail Level: Printer-Friendly View Extended View  Confidential Drug Utilization Report  Search Terms: shaneka anthony, 1945Search Date: 12/16/2020 16:17:17 PM  The Drug Utilization Report below displays all of the controlled substance prescriptions, if any, that your patient has filled in the last twelve months. The information displayed on this report is compiled from pharmacy submissions to the Department, and accurately reflects the information as submitted by the pharmacies.    This report was requested by: Winter Barker | Reference #: [id number]    Others' Prescriptions  Patient Name: Shaneka AnthonyBirth Date: 1945  Address: [place], [place] [address]Kfe: Female  Rx Written	Rx Dispensed	Drug	Quantity	Days Supply	Prescriber Name	Payment Method	Dispenser  01/15/2020	01/19/2020	oxycodone-acetaminophen 5-325 mg tab	60	10	Letha Campbell	Insurance	[facility] [employer]  01/16/2020	01/16/2020	oxycodone-acetaminophen 5-325 mg tab	30	5	Samuel Cordova MD	[facility]  * - Drugs marked with an asterisk are compound drugs. If the compound drug is made up of more than one controlled substance, then each controlled

## 2020-12-18 NOTE — DISCHARGE NOTE PROVIDER - CARE PROVIDER_API CALL
Manuel Vega  ORTHOPAEDIC SURGERY  [address], [address] [place] [address]  Phone: [phone]  Fax: [phone]  Follow Up Time: 1 week    Manuel Conley)  Internal Medicine  [address] [place] [address]  Phone: [phone]  Fax: [phone]  Follow Up Time: 1 week    [facility]  [address] [place] [address]  Phone: [phone]  Fax: [phone]  Follow Up Time: 1 week    Rey Deshpande  ORTHOPAEDIC SURGERY  [address] [facility], [address]st [place] [address]  Phone: [phone]  Fax: [phone]  Follow Up Time: 1 week

## 2020-12-18 NOTE — DISCHARGE NOTE PROVIDER - HOSPITAL COURSE
Patient is 75 year old Female from home lives with  with  PMHx of mild to moderate dementia and no significant PSHx presents to ED after slipping and having fall on stairs. Pt reports of landing on her right side so having Rt lower back and Rt hip pain from fall.  Patient admitted to medicine for mechanical fall and right hip pain. Head CT negative for bleeding or CVA, pelvis CT positive for  mildly displaced acute right proximal femoral periprosthetic fracture in the intertrochanteric/subtrochanteric region. Ortho consulted f/u recommendations, conservative treatment.  Pain management onboard, patient seen and evaluated by PT with recommendation for 7-8 weeks rehabilitation facility.      Given patient's improved clinical status and current hemodynamic stability, decision was made to discharge.  Please refer to patient's complete medical chart with documents for a full hospital course, for this is only a brief summary. Patient is 75 year old Female from home lives with  with  PMHx of mild to moderate dementia and no significant PSHx presents to ED after slipping and having fall on stairs. Pt reports of landing on her right side so having Rt lower back and Rt hip pain from fall.  Patient admitted to medicine for mechanical fall and right hip pain. Head CT negative for bleeding or CVA, pelvis CT positive for  mildly displaced acute right proximal femoral periprosthetic fracture in the intertrochanteric/subtrochanteric region. Ortho consulted f/u recommendations, conservative treatment.  Pain management onboard, patient seen and evaluated by PT with recommendation for 7-8 weeks rehabilitation facility.  Patient with UTI and started on ceftriaxone, with plan to convert to augmentin on discharge to complete 3 day course.      Given patient's improved clinical status and current hemodynamic stability, decision was made to discharge.  Please refer to patient's complete medical chart with documents for a full hospital course, for this is only a brief summary.

## 2020-12-18 NOTE — CHART NOTE - NSCHARTNOTEFT_GEN_A_CORE
Patient seen and examined.  Case d/w NP  Patient being discharged today. Please see full d/c note.  Briefly, Patient is a 75yoF who presented to ED after mechanical fall with subsequent pain in the right hip and lower back. Patinet found to have a mildly displaced acute right proximal femoral periprosthetic fracture in the intertrochanteric/subtrochanteric region. Patient was seen by Ortho and pain management. Conservative management was recommended. Patient evaluated by PT who recommended NIURKA. Pain is currently well controlled. c/w current regimen with bowel regimen.   UTI - cystitis, uncomplicated - given dose of IV ceftriaxone. On d/c can give augmentin 875 PO BID x 2 days to complete 3 day course.  Case d/w Case management, social work and nursing on IDRs.  D/C time: 40 minutes

## 2020-12-18 NOTE — DISCHARGE NOTE PROVIDER - NSDCCPCAREPLAN_GEN_ALL_CORE_FT
PRINCIPAL DISCHARGE DIAGNOSIS  Diagnosis: Hip injury, right, initial encounter  Assessment and Plan of Treatment: You have been diagnosed with right femoral fracture.  Pelvis CT show  Mildly displaced acute right proximal femoral periprosthetic fracture in the intertrochanteric/subtrochanteric region.  Please follow up with Dr. Manuel Vega at clinic:  [address] [facility] [address]  Ph: [phone]      SECONDARY DISCHARGE DIAGNOSES  Diagnosis: Fall at home, initial encounter  Assessment and Plan of Treatment: You have been fallin at home.  Follow-up with your primary care physician.  When walking: ensure proper footwear, adequate lighting ,avoid loose rugs and clutter in walkway.   Ensure adequate rest ,nutrition, and hydration.

## 2020-12-18 NOTE — PROGRESS NOTE ADULT - PROBLEM SELECTOR PLAN 1
Imaging shows Status post right total hip arthroplasty. Mildly displaced acute right proximal femoral periprosthetic fracture in the intertrochanteric/subtrochanteric region. No surgical intervention at this moment, conservative therapy. Toe touch weight bearing. Pt to follow up with Dr. Deshpande.  Hip specialist - Dr. marroquin - [phone]  Nonopioid Recommendations  - mild pain - nonpharm recommendations  - Acetaminophen 1 gram po q 8 prn  - dc nsaids  Opioid Recommendation  - Oxycodone 5mg po q 4 hours prn  Bowel Regimen   - Senna  - Miralax  OOB/PT - toe touch.  Dc to rehab

## 2020-12-18 NOTE — PROGRESS NOTE ADULT - PROBLEM SELECTOR PROBLEM 1
Hip injury, right, initial encounter
Acute hip pain, right
Hip injury, right, initial encounter
Acute hip pain, right

## 2020-12-18 NOTE — DISCHARGE NOTE PROVIDER - NSDCMRMEDTOKEN_GEN_ALL_CORE_FT
FERROUS SULFATE: TAKE ONE TABLET BY MOUTH TWICE A DAY ANEMIA   acetaminophen 500 mg oral tablet: 2 tab(s) orally every 8 hours, As Needed -for mild pain   Augmentin 875 mg-125 mg oral tablet: 1 tab(s) orally 2 times a day   FERROUS SULFATE: TAKE ONE TABLET BY MOUTH TWICE A DAY ANEMIA  oxyCODONE 5 mg oral tablet: 1 tab(s) orally every 6 hours, As Needed -Severe Pain (7 - 10) - for severe pain MDD:4 tablets  senna oral tablet: 2 tab(s) orally once a day (at bedtime)

## 2020-12-18 NOTE — PROGRESS NOTE ADULT - SUBJECTIVE AND OBJECTIVE BOX
Source of information: , Chart review  Patient language: English  : n/a    CC:  right hip pain    This is a 75yFemale patient who presents to the hospital for Patient is a 75y old  Female who presents with a chief complaint of fall (18 Dec 2020 12:56)    Pt with right periprosthetic hip fracture.  + minimal right hip pain.  Pt with dementia and forgetfulness  Pt is for discharge to rehab.      PAIN SCORE:     5/10    SCALE USED: (1-10 NRS)      PAST MEDICAL & SURGICAL HISTORY:  No pertinent past medical history    No significant past surgical history        FAMILY HISTORY:    SOCIAL HISTORY:  [x ] Denies Smoking, Alcohol, or Drug Use    Allergies    No Known Allergies    Intolerances        MEDICATIONS:    MEDICATIONS  (STANDING):  acetaminophen   Tablet .. 1000 milliGRAM(s) Oral every 8 hours  cefTRIAXone   IVPB 1000 milliGRAM(s) IV Intermittent every 24 hours  enoxaparin Injectable 40 milliGRAM(s) SubCutaneous daily  naproxen 375 milliGRAM(s) Oral every 8 hours  pantoprazole    Tablet 40 milliGRAM(s) Oral before breakfast  polyethylene glycol 3350 17 Gram(s) Oral daily  senna 2 Tablet(s) Oral at bedtime  sodium chloride 0.9%. 1000 milliLiter(s) (70 mL/Hr) IV Continuous <Continuous>    MEDICATIONS  (PRN):  oxyCODONE    IR 5 milliGRAM(s) Oral every 4 hours PRN Severe Pain (7 - 10)      Vital Signs Last 24 Hrs  T(C): 36.6 (18 Dec 2020 14:04), Max: 36.9 (17 Dec 2020 21:02)  T(F): 97.8 (18 Dec 2020 14:04), Max: 98.5 (17 Dec 2020 21:02)  HR: 73 (18 Dec 2020 14:04) (56 - 73)  BP: 156/83 (18 Dec 2020 14:04) (137/72 - 156/89)  BP(mean): --  RR: 18 (18 Dec 2020 14:04) (18 - 18)  SpO2: 96% (18 Dec 2020 14:04) (95% - 97%)    LABS:                          11.3   7.50  )-----------( 159      ( 17 Dec 2020 06:21 )             35.4     12-17    140  |  106  |  18  ----------------------------<  82  3.7   |  28  |  0.74    Ca    9.0      17 Dec 2020 06:21    TPro  6.1  /  Alb  3.0<L>  /  TBili  0.6  /  DBili  x   /  AST  9<L>  /  ALT  12  /  AlkPhos  79  12-17      LIVER FUNCTIONS - ( 17 Dec 2020 06:21 )  Alb: 3.0 g/dL / Pro: 6.1 g/dL / ALK PHOS: 79 U/L / ALT: 12 U/L DA / AST: 9 U/L / GGT: x             CAPILLARY BLOOD GLUCOSE      POCT Blood Glucose.: 112 mg/dL (17 Dec 2020 21:20)  POCT Blood Glucose.: 93 mg/dL (17 Dec 2020 16:47)      Radiology:    Drug Screen:        REVIEW OF SYSTEMS:  CONSTITUTIONAL: No fever or fatigue  RESPIRATORY: No cough, wheezing, chills or hemoptysis; No shortness of breath  CARDIOVASCULAR: No chest pain, palpitations, dizziness, or leg swelling  GASTROINTESTINAL: No abdominal or epigastric pain. No nausea, vomiting; No diarrhea or constipation.   GENITOURINARY: No dysuria, frequency, hematuria, retention or incontinence  MUSCULOSKELETAL:+ right hip pain  NEURO: No headaches, No numbness/tingling b/l LE, No weakness  PSYCHIATRIC: forgetfulness    PHYSICAL EXAM:  GENERAL:  Alert & Oriented X3, NAD, Good concentration  CHEST/LUNG: Clear to auscultation bilaterally; No rales, rhonchi, wheezing, or rubs  HEART: Regular rate and rhythm; No murmurs, rubs, or gallops  ABDOMEN: Soft, Nontender, Nondistended; Bowel sounds present  EXTREMITIES:  2+ Peripheral Pulses, No + right hip tenderness  SKIN: No rashes or lesions    Risk factors associated with adverse outcomes related to opioid treatment  [ ]  Concurrent benzodiazepine use  [ ]  History/ Active substance use or alcohol use disorder  [ ] Psychiatric co-morbidity  [ ] Sleep apnea  [ ] COPD  [ ] BMI> 35  [ ] Liver dysfunction  [ ] Renal dysfunction  [ ] CHF  [ ] Smoker  [ ]  Age > 60 years    [x ]  [place]  Reviewed and Copied to Chart. See below.    Plan of care and goal oriented pain management treatment options were discussed with patient and /or primary care giver; all questions and concerns were addressed and care was aligned with patient's wishes.    Educated patient on goal oriented pain management treatment options     12-18-20 @ 15:08

## 2020-12-18 NOTE — DISCHARGE NOTE PROVIDER - PROVIDER TOKENS
PROVIDER:[TOKEN:[[id number]],FOLLOWUP:[1 week]],PROVIDER:[TOKEN:[[id number]],FOLLOWUP:[1 week]],PROVIDER:[TOKEN:[[id number]],FOLLOWUP:[1 week]],PROVIDER:[TOKEN:[[id number]],FOLLOWUP:[1 week]]

## 2020-12-28 PROCEDURE — 36415 COLL VENOUS BLD VENIPUNCTURE: CPT

## 2020-12-28 PROCEDURE — 71045 X-RAY EXAM CHEST 1 VIEW: CPT

## 2020-12-28 PROCEDURE — 72100 X-RAY EXAM L-S SPINE 2/3 VWS: CPT

## 2020-12-28 PROCEDURE — 83735 ASSAY OF MAGNESIUM: CPT

## 2020-12-28 PROCEDURE — 84484 ASSAY OF TROPONIN QUANT: CPT

## 2020-12-28 PROCEDURE — 82140 ASSAY OF AMMONIA: CPT

## 2020-12-28 PROCEDURE — 80307 DRUG TEST PRSMV CHEM ANLYZR: CPT

## 2020-12-28 PROCEDURE — 72131 CT LUMBAR SPINE W/O DYE: CPT

## 2020-12-28 PROCEDURE — 86901 BLOOD TYPING SEROLOGIC RH(D): CPT

## 2020-12-28 PROCEDURE — 85027 COMPLETE CBC AUTOMATED: CPT

## 2020-12-28 PROCEDURE — 85610 PROTHROMBIN TIME: CPT

## 2020-12-28 PROCEDURE — 99285 EMERGENCY DEPT VISIT HI MDM: CPT | Mod: 25

## 2020-12-28 PROCEDURE — 96375 TX/PRO/DX INJ NEW DRUG ADDON: CPT

## 2020-12-28 PROCEDURE — 84100 ASSAY OF PHOSPHORUS: CPT

## 2020-12-28 PROCEDURE — 86850 RBC ANTIBODY SCREEN: CPT

## 2020-12-28 PROCEDURE — 83036 HEMOGLOBIN GLYCOSYLATED A1C: CPT

## 2020-12-28 PROCEDURE — 86769 SARS-COV-2 COVID-19 ANTIBODY: CPT

## 2020-12-28 PROCEDURE — 85730 THROMBOPLASTIN TIME PARTIAL: CPT

## 2020-12-28 PROCEDURE — 84550 ASSAY OF BLOOD/URIC ACID: CPT

## 2020-12-28 PROCEDURE — 90686 IIV4 VACC NO PRSV 0.5 ML IM: CPT

## 2020-12-28 PROCEDURE — 85025 COMPLETE CBC W/AUTO DIFF WBC: CPT

## 2020-12-28 PROCEDURE — 83880 ASSAY OF NATRIURETIC PEPTIDE: CPT

## 2020-12-28 PROCEDURE — 82553 CREATINE MB FRACTION: CPT

## 2020-12-28 PROCEDURE — 87086 URINE CULTURE/COLONY COUNT: CPT

## 2020-12-28 PROCEDURE — 82607 VITAMIN B-12: CPT

## 2020-12-28 PROCEDURE — 80061 LIPID PANEL: CPT

## 2020-12-28 PROCEDURE — 81001 URINALYSIS AUTO W/SCOPE: CPT

## 2020-12-28 PROCEDURE — 73551 X-RAY EXAM OF FEMUR 1: CPT

## 2020-12-28 PROCEDURE — 82550 ASSAY OF CK (CPK): CPT

## 2020-12-28 PROCEDURE — 86900 BLOOD TYPING SEROLOGIC ABO: CPT

## 2020-12-28 PROCEDURE — 83615 LACTATE (LD) (LDH) ENZYME: CPT

## 2020-12-28 PROCEDURE — 87635 SARS-COV-2 COVID-19 AMP PRB: CPT

## 2020-12-28 PROCEDURE — 80053 COMPREHEN METABOLIC PANEL: CPT

## 2020-12-28 PROCEDURE — 84443 ASSAY THYROID STIM HORMONE: CPT

## 2020-12-28 PROCEDURE — 82306 VITAMIN D 25 HYDROXY: CPT

## 2020-12-28 PROCEDURE — 96374 THER/PROPH/DIAG INJ IV PUSH: CPT

## 2020-12-28 PROCEDURE — 82962 GLUCOSE BLOOD TEST: CPT

## 2020-12-28 PROCEDURE — 72192 CT PELVIS W/O DYE: CPT

## 2020-12-28 PROCEDURE — 85652 RBC SED RATE AUTOMATED: CPT

## 2020-12-28 PROCEDURE — 73502 X-RAY EXAM HIP UNI 2-3 VIEWS: CPT

## 2020-12-28 PROCEDURE — 87186 SC STD MICRODIL/AGAR DIL: CPT

## 2021-01-22 ENCOUNTER — INPATIENT (INPATIENT)
Facility: HOSPITAL | Age: 76
LOS: 3 days | Discharge: NURSING, REHAB OR LONG-TERM CARE | DRG: 871 | End: 2021-01-26
Attending: INTERNAL MEDICINE | Admitting: INTERNAL MEDICINE
Payer: COMMERCIAL

## 2021-01-22 VITALS
SYSTOLIC BLOOD PRESSURE: 117 MMHG | OXYGEN SATURATION: 99 % | TEMPERATURE: 95 F | RESPIRATION RATE: 19 BRPM | DIASTOLIC BLOOD PRESSURE: 76 MMHG | HEIGHT: 65 IN | HEART RATE: 103 BPM

## 2021-01-22 DIAGNOSIS — R09.02 HYPOXEMIA: ICD-10-CM

## 2021-01-22 DIAGNOSIS — F03.90 UNSPECIFIED DEMENTIA, UNSPECIFIED SEVERITY, WITHOUT BEHAVIORAL DISTURBANCE, PSYCHOTIC DISTURBANCE, MOOD DISTURBANCE, AND ANXIETY: ICD-10-CM

## 2021-01-22 DIAGNOSIS — Z29.9 ENCOUNTER FOR PROPHYLACTIC MEASURES, UNSPECIFIED: ICD-10-CM

## 2021-01-22 DIAGNOSIS — I26.99 OTHER PULMONARY EMBOLISM WITHOUT ACUTE COR PULMONALE: ICD-10-CM

## 2021-01-22 DIAGNOSIS — A41.9 SEPSIS, UNSPECIFIED ORGANISM: ICD-10-CM

## 2021-01-22 DIAGNOSIS — S72.001A FRACTURE OF UNSPECIFIED PART OF NECK OF RIGHT FEMUR, INITIAL ENCOUNTER FOR CLOSED FRACTURE: ICD-10-CM

## 2021-01-22 LAB
ALBUMIN SERPL ELPH-MCNC: 2.9 G/DL — LOW (ref 3.5–5)
ALP SERPL-CCNC: 121 U/L — HIGH (ref 40–120)
ALT FLD-CCNC: 16 U/L DA — SIGNIFICANT CHANGE UP (ref 10–60)
ANION GAP SERPL CALC-SCNC: 6 MMOL/L — SIGNIFICANT CHANGE UP (ref 5–17)
APPEARANCE UR: CLEAR — SIGNIFICANT CHANGE UP
APTT BLD: 35.1 SEC — SIGNIFICANT CHANGE UP (ref 27.5–35.5)
AST SERPL-CCNC: 15 U/L — SIGNIFICANT CHANGE UP (ref 10–40)
BACTERIA # UR AUTO: ABNORMAL /HPF
BASOPHILS # BLD AUTO: 0.1 K/UL — SIGNIFICANT CHANGE UP (ref 0–0.2)
BASOPHILS NFR BLD AUTO: 0.6 % — SIGNIFICANT CHANGE UP (ref 0–2)
BILIRUB SERPL-MCNC: 0.6 MG/DL — SIGNIFICANT CHANGE UP (ref 0.2–1.2)
BILIRUB UR-MCNC: NEGATIVE — SIGNIFICANT CHANGE UP
BUN SERPL-MCNC: 19 MG/DL — HIGH (ref 7–18)
CALCIUM SERPL-MCNC: 9.8 MG/DL — SIGNIFICANT CHANGE UP (ref 8.4–10.5)
CHLORIDE SERPL-SCNC: 103 MMOL/L — SIGNIFICANT CHANGE UP (ref 96–108)
CO2 SERPL-SCNC: 29 MMOL/L — SIGNIFICANT CHANGE UP (ref 22–31)
COLOR SPEC: YELLOW — SIGNIFICANT CHANGE UP
COMMENT - URINE: SIGNIFICANT CHANGE UP
CREAT SERPL-MCNC: 0.89 MG/DL — SIGNIFICANT CHANGE UP (ref 0.5–1.3)
D DIMER BLD IA.RAPID-MCNC: 4133 NG/ML DDU — HIGH
DIFF PNL FLD: ABNORMAL
EOSINOPHIL # BLD AUTO: 0.04 K/UL — SIGNIFICANT CHANGE UP (ref 0–0.5)
EOSINOPHIL NFR BLD AUTO: 0.2 % — SIGNIFICANT CHANGE UP (ref 0–6)
EPI CELLS # UR: ABNORMAL /HPF
GLUCOSE SERPL-MCNC: 119 MG/DL — HIGH (ref 70–99)
GLUCOSE UR QL: NEGATIVE — SIGNIFICANT CHANGE UP
HCT VFR BLD CALC: 43.4 % — SIGNIFICANT CHANGE UP (ref 34.5–45)
HGB BLD-MCNC: 13.8 G/DL — SIGNIFICANT CHANGE UP (ref 11.5–15.5)
IMM GRANULOCYTES NFR BLD AUTO: 1 % — SIGNIFICANT CHANGE UP (ref 0–1.5)
INR BLD: 1.22 RATIO — HIGH (ref 0.88–1.16)
KETONES UR-MCNC: NEGATIVE — SIGNIFICANT CHANGE UP
LACTATE SERPL-SCNC: 2 MMOL/L — SIGNIFICANT CHANGE UP (ref 0.7–2)
LDH SERPL L TO P-CCNC: 258 U/L — HIGH (ref 120–225)
LEUKOCYTE ESTERASE UR-ACNC: NEGATIVE — SIGNIFICANT CHANGE UP
LYMPHOCYTES # BLD AUTO: 13.9 % — SIGNIFICANT CHANGE UP (ref 13–44)
LYMPHOCYTES # BLD AUTO: 2.39 K/UL — SIGNIFICANT CHANGE UP (ref 1–3.3)
MCHC RBC-ENTMCNC: 27.3 PG — SIGNIFICANT CHANGE UP (ref 27–34)
MCHC RBC-ENTMCNC: 31.8 GM/DL — LOW (ref 32–36)
MCV RBC AUTO: 85.9 FL — SIGNIFICANT CHANGE UP (ref 80–100)
MONOCYTES # BLD AUTO: 0.98 K/UL — HIGH (ref 0–0.9)
MONOCYTES NFR BLD AUTO: 5.7 % — SIGNIFICANT CHANGE UP (ref 2–14)
NEUTROPHILS # BLD AUTO: 13.47 K/UL — HIGH (ref 1.8–7.4)
NEUTROPHILS NFR BLD AUTO: 78.6 % — HIGH (ref 43–77)
NITRITE UR-MCNC: NEGATIVE — SIGNIFICANT CHANGE UP
NRBC # BLD: 0 /100 WBCS — SIGNIFICANT CHANGE UP (ref 0–0)
PH UR: 5 — SIGNIFICANT CHANGE UP (ref 5–8)
PLATELET # BLD AUTO: 207 K/UL — SIGNIFICANT CHANGE UP (ref 150–400)
POTASSIUM SERPL-MCNC: 4.6 MMOL/L — SIGNIFICANT CHANGE UP (ref 3.5–5.3)
POTASSIUM SERPL-SCNC: 4.6 MMOL/L — SIGNIFICANT CHANGE UP (ref 3.5–5.3)
PROT SERPL-MCNC: 7.3 G/DL — SIGNIFICANT CHANGE UP (ref 6–8.3)
PROT UR-MCNC: 30 MG/DL
PROTHROM AB SERPL-ACNC: 14.4 SEC — HIGH (ref 10.6–13.6)
RAPID RVP RESULT: SIGNIFICANT CHANGE UP
RBC # BLD: 5.05 M/UL — SIGNIFICANT CHANGE UP (ref 3.8–5.2)
RBC # FLD: 14.5 % — SIGNIFICANT CHANGE UP (ref 10.3–14.5)
RBC CASTS # UR COMP ASSIST: ABNORMAL /HPF (ref 0–2)
SARS-COV-2 RNA SPEC QL NAA+PROBE: SIGNIFICANT CHANGE UP
SODIUM SERPL-SCNC: 138 MMOL/L — SIGNIFICANT CHANGE UP (ref 135–145)
SP GR SPEC: 1.02 — SIGNIFICANT CHANGE UP (ref 1.01–1.02)
TROPONIN I SERPL-MCNC: 0.12 NG/ML — HIGH (ref 0–0.04)
TROPONIN I SERPL-MCNC: 0.13 NG/ML — HIGH (ref 0–0.04)
UROBILINOGEN FLD QL: NEGATIVE — SIGNIFICANT CHANGE UP
WBC # BLD: 17.15 K/UL — HIGH (ref 3.8–10.5)
WBC # FLD AUTO: 17.15 K/UL — HIGH (ref 3.8–10.5)
WBC UR QL: SIGNIFICANT CHANGE UP /HPF (ref 0–5)

## 2021-01-22 PROCEDURE — 71045 X-RAY EXAM CHEST 1 VIEW: CPT | Mod: 26

## 2021-01-22 PROCEDURE — 93010 ELECTROCARDIOGRAM REPORT: CPT

## 2021-01-22 PROCEDURE — 71275 CT ANGIOGRAPHY CHEST: CPT | Mod: 26

## 2021-01-22 PROCEDURE — 99285 EMERGENCY DEPT VISIT HI MDM: CPT

## 2021-01-22 RX ORDER — CEFTRIAXONE 500 MG/1
1000 INJECTION, POWDER, FOR SOLUTION INTRAMUSCULAR; INTRAVENOUS ONCE
Refills: 0 | Status: COMPLETED | OUTPATIENT
Start: 2021-01-22 | End: 2021-01-22

## 2021-01-22 RX ORDER — ENOXAPARIN SODIUM 100 MG/ML
60 INJECTION SUBCUTANEOUS
Refills: 0 | Status: DISCONTINUED | OUTPATIENT
Start: 2021-01-22 | End: 2021-01-25

## 2021-01-22 RX ORDER — SODIUM CHLORIDE 9 MG/ML
1000 INJECTION INTRAMUSCULAR; INTRAVENOUS; SUBCUTANEOUS ONCE
Refills: 0 | Status: COMPLETED | OUTPATIENT
Start: 2021-01-22 | End: 2021-01-22

## 2021-01-22 RX ORDER — ENOXAPARIN SODIUM 100 MG/ML
40 INJECTION SUBCUTANEOUS ONCE
Refills: 0 | Status: COMPLETED | OUTPATIENT
Start: 2021-01-22 | End: 2021-01-22

## 2021-01-22 RX ADMIN — CEFTRIAXONE 100 MILLIGRAM(S): 500 INJECTION, POWDER, FOR SOLUTION INTRAMUSCULAR; INTRAVENOUS at 18:14

## 2021-01-22 RX ADMIN — SODIUM CHLORIDE 1000 MILLILITER(S): 9 INJECTION INTRAMUSCULAR; INTRAVENOUS; SUBCUTANEOUS at 16:34

## 2021-01-22 RX ADMIN — ENOXAPARIN SODIUM 40 MILLIGRAM(S): 100 INJECTION SUBCUTANEOUS at 19:04

## 2021-01-22 NOTE — ED PROVIDER NOTE - CLINICAL SUMMARY MEDICAL DECISION MAKING FREE TEXT BOX
75F presenting with hypoxia. patient without complaints. drops to 88% on RA. afebrile, breathing comfortably on 4L NC. concern for covid, chf. will get labs, fluids, ekg. likely admission.

## 2021-01-22 NOTE — H&P ADULT - HISTORY OF PRESENT ILLNESS
75F, from  [facility]  with PMH of Dementia, right hip fracture in rehab, sent to the emergency department for hypoxia. Patient is a poor historian and all the pertinent medical information was obtained from the Facility papers and ED noted.  and has no current complaints. per her daughter-in-law (healthcare proxy) and Dr. Miramontes, patient recently tested negative for covid but was found to be hypoxic today    In the ED,   Pt is AA0X , confused,   Vitals- 96.1, HR 98, /01, SPO2- 98% on 4L  CXR- Clear lungs at this time. Heart enlargement again noted.WBC- 17.5  Trops 0.13  EKG  75F, from  [facility] ECF, wheel chair bound  with PMH of Dementia, right hip fracture in rehab, sent to the emergency department for hypoxia. Patient is a poor historian and all the pertinent medical information was obtained from the Facility papers and ED provider. Daughter in law also added to some collateral information. Pt was noted to be short of breath with increased work of breathing at the facility today. She has a covid testing earlier this week that was negative. Pt in ED was noted to have SPO2- 83% on RA and was then started on 4L NC.  Denies any active smoking, alcohol or any substance abuse.     In the ED,   Pt is AA0X , confused,   Vitals- 96.1, HR 98, /01, SPO2- 98% on 4L  CXR- Clear lungs at this time. Heart enlargement again noted .WBC- 17.5  Trops 0.13  EKG - sinus tachycardia   CTA- Extensive bilateral pulmonary emboli involving all lobes.  Evidence of right heart strain.  Covid- negative

## 2021-01-22 NOTE — ED ADULT NURSE NOTE - OBJECTIVE STATEMENT
pt is here for altered mental status.  BIBA, sending from nursing home, confusion and hypoxia with O2 Saturation, denied chest pain or fever, no distress noted at this time. pt is here for altered mental status.  BIBA, sending from nursing home, confusion and hypoxia with O2 Saturation, denied chest pain or fever, no distress noted at this time. left lower thigh innerside 2 bedsore stage 2 upper one ,1.5 x2 cm. lower one 2x2 cm  clean . lower back lot of rashes  and scratch marks .

## 2021-01-22 NOTE — H&P ADULT - PROBLEM SELECTOR PLAN 5
IMPROVE VTE Individual Risk Assessment    RISK                                                          Points  [] Previous VTE                                           3  [] Thrombophilia                                        2  [] Lower limb paralysis                              2   [] Current Cancer                                       2   [x] Immobilization > 24 hrs                        1  [x] ICU/CCU stay > 24 hours                       1  [x] Age > 60                                                   1    IMPROVE VTE Score:  FD lovenox for PE   PPI

## 2021-01-22 NOTE — ED ADULT NURSE NOTE - NSIMPLEMENTINTERV_GEN_ALL_ED
Implemented All Fall Risk Interventions:  [place] to call system. Call bell, personal items and telephone within reach. Instruct patient to call for assistance. Room bathroom lighting operational. Non-slip footwear when patient is off stretcher. Physically safe environment: no spills, clutter or unnecessary equipment. Stretcher in lowest position, wheels locked, appropriate side rails in place. Provide visual cue, wrist band, yellow gown, etc. Monitor gait and stability. Monitor for mental status changes and reorient to person, place, and time. Review medications for side effects contributing to fall risk. Reinforce activity limits and safety measures with patient and family.

## 2021-01-22 NOTE — H&P ADULT - PROBLEM SELECTOR PLAN 1
Pt sent from facility for hypoxia and increased work of breathing noted today  CXR- Clear lungs at this time. Heart enlargement again noted .WBC- 17.5  Trops 0.13  EKG - sinus tachycardia   CTA- Extensive bilateral pulmonary emboli involving all lobes.  Evidence of right heart strain.  Covid- negative   To be started on lovenox Full dose  s/p lovenox in ED Pt sent from facility for hypoxia and increased work of breathing noted today  CXR- Clear lungs at this time. Heart enlargement again noted .WBC- 17.5  Trops 0.13  EKG - sinus tachycardia   CTA- Extensive bilateral pulmonary emboli involving all lobes.  Evidence of right heart strain.  f/u echo   Covid- negative   To be started on lovenox Full dose  s/p lovenox in ED

## 2021-01-22 NOTE — H&P ADULT - NSHPPHYSICALEXAM_GEN_ALL_CORE
Vital Signs (24 Hrs):  T(C): 35.6 (01-22-21 @ 17:46), Max: 36.1 (01-22-21 @ 15:18)  HR: 98 (01-22-21 @ 17:46) (98 - 103)  BP: 147/91 (01-22-21 @ 17:46) (117/76 - 147/91)  RR: 17 (01-22-21 @ 17:46) (17 - 19)  SpO2: 97% (01-22-21 @ 17:46) (89% - 99%)  Wt(kg): --  Daily Height in cm: 165.1 (22 Jan 2021 14:47)    Daily     I&O's Summary

## 2021-01-22 NOTE — ED PROVIDER NOTE - PROGRESS NOTE DETAILS
patient's family updated on results. spoke with Dr. Miramontes. requesting patient receive lovenox to treat PE vs covid. pending CTA chest. Umair Tate informed patient's daughter in-law of PE and Dr. Miramontes. requesting heparin stefanie Tate

## 2021-01-22 NOTE — ED PROVIDER NOTE - PHYSICAL EXAMINATION
General: well appearing female, no acute distress   HEENT: normocephalic, atraumatic   Respiratory: normal work of breathing, lungs clear to auscultation bilaterally   Cardiac: regular rate and rhythm   Abdomen: soft, non-tender, no guarding or rebound   MSK: no swelling or tenderness of lower extremities, moving all extremities spontaneously   Skin: warm, dry   Neuro: A&Ox2  Psych: appropriate affect

## 2021-01-22 NOTE — ED ADULT TRIAGE NOTE - CHIEF COMPLAINT QUOTE
Pt BIBA from [facility] assisted living for confusion and hypoxia with O2 Saturation 85% on RA. Denies SOB. Pt BIBA from [facility] ECF for confusion and hypoxia with O2 Saturation 85% on RA. Denies SOB.

## 2021-01-22 NOTE — H&P ADULT - NSHPLABSRESULTS_GEN_ALL_CORE
13.8   17.15 )-----------( 207      ( 2021 15:59 )             43.4           138  |  103  |  19<H>  ----------------------------<  119<H>  4.6   |  29  |  0.89    Ca    9.8      2021 15:59    TPro  7.3  /  Alb  2.9<L>  /  TBili  0.6  /  DBili  x   /  AST  15  /  ALT  16  /  AlkPhos  121<H>                Urinalysis Basic - ( 2021 16:19 )    Color: Yellow / Appearance: Clear / S.020 / pH: x  Gluc: x / Ketone: Negative  / Bili: Negative / Urobili: Negative   Blood: x / Protein: 30 mg/dL / Nitrite: Negative   Leuk Esterase: Negative / RBC: 2-5 /HPF / WBC 0-2 /HPF   Sq Epi: x / Non Sq Epi: Occasional /HPF / Bacteria: Many /HPF        PT/INR - ( 2021 15:59 )   PT: 14.4 sec;   INR: 1.22 ratio         PTT - ( 2021 15:59 )  PTT:35.1 sec    Lactate Trend   @ 15:59 Lactate:2.0       CARDIAC MARKERS ( 2021 15:59 )  0.131 ng/mL / x     / x     / x     / x            CAPILLARY BLOOD GLUCOSE

## 2021-01-22 NOTE — H&P ADULT - PROBLEM SELECTOR PLAN 4
Pt has chronic Pt has  chronic Hip fracture   xray pelvis- Old. Fractures around the low central pelvis. Incidental fibroid.  Pt was wheel chair bound

## 2021-01-22 NOTE — H&P ADULT - PROBLEM SELECTOR PLAN 2
Pt  noted to be hypothermic, tachycardic with WBC 17K   no clear source of infection noted   CXR- no evidence of any infiltrates  S/P 1 L NS bolus in ED and Ceftraixone   c/w rocephin   f/u ucx and bd cx

## 2021-01-22 NOTE — ED ADULT NURSE NOTE - CHIEF COMPLAINT QUOTE
Pt BIBA from [facility] ECF for confusion and hypoxia with O2 Saturation 85% on RA. Denies SOB.

## 2021-01-23 LAB
A1C WITH ESTIMATED AVERAGE GLUCOSE RESULT: 5.1 % — SIGNIFICANT CHANGE UP (ref 4–5.6)
ALBUMIN SERPL ELPH-MCNC: 2.5 G/DL — LOW (ref 3.5–5)
ALP SERPL-CCNC: 103 U/L — SIGNIFICANT CHANGE UP (ref 40–120)
ALT FLD-CCNC: 16 U/L DA — SIGNIFICANT CHANGE UP (ref 10–60)
ANION GAP SERPL CALC-SCNC: 7 MMOL/L — SIGNIFICANT CHANGE UP (ref 5–17)
APTT BLD: 33.9 SEC — SIGNIFICANT CHANGE UP (ref 27.5–35.5)
AST SERPL-CCNC: 13 U/L — SIGNIFICANT CHANGE UP (ref 10–40)
BASOPHILS # BLD AUTO: 0.11 K/UL — SIGNIFICANT CHANGE UP (ref 0–0.2)
BASOPHILS NFR BLD AUTO: 0.9 % — SIGNIFICANT CHANGE UP (ref 0–2)
BILIRUB SERPL-MCNC: 0.4 MG/DL — SIGNIFICANT CHANGE UP (ref 0.2–1.2)
BUN SERPL-MCNC: 14 MG/DL — SIGNIFICANT CHANGE UP (ref 7–18)
CALCIUM SERPL-MCNC: 9.3 MG/DL — SIGNIFICANT CHANGE UP (ref 8.4–10.5)
CHLORIDE SERPL-SCNC: 107 MMOL/L — SIGNIFICANT CHANGE UP (ref 96–108)
CHOLEST SERPL-MCNC: 153 MG/DL — SIGNIFICANT CHANGE UP
CO2 SERPL-SCNC: 27 MMOL/L — SIGNIFICANT CHANGE UP (ref 22–31)
CREAT SERPL-MCNC: 0.72 MG/DL — SIGNIFICANT CHANGE UP (ref 0.5–1.3)
CULTURE RESULTS: NO GROWTH — SIGNIFICANT CHANGE UP
EOSINOPHIL # BLD AUTO: 0.2 K/UL — SIGNIFICANT CHANGE UP (ref 0–0.5)
EOSINOPHIL NFR BLD AUTO: 1.7 % — SIGNIFICANT CHANGE UP (ref 0–6)
ESTIMATED AVERAGE GLUCOSE: 100 MG/DL — SIGNIFICANT CHANGE UP (ref 68–114)
FERRITIN SERPL-MCNC: 248 NG/ML — HIGH (ref 15–150)
FOLATE SERPL-MCNC: 9 NG/ML — SIGNIFICANT CHANGE UP
GLUCOSE SERPL-MCNC: 95 MG/DL — SIGNIFICANT CHANGE UP (ref 70–99)
HCT VFR BLD CALC: 38.6 % — SIGNIFICANT CHANGE UP (ref 34.5–45)
HDLC SERPL-MCNC: 54 MG/DL — SIGNIFICANT CHANGE UP
HGB BLD-MCNC: 12 G/DL — SIGNIFICANT CHANGE UP (ref 11.5–15.5)
IMM GRANULOCYTES NFR BLD AUTO: 0.7 % — SIGNIFICANT CHANGE UP (ref 0–1.5)
INR BLD: 1.27 RATIO — HIGH (ref 0.88–1.16)
IRON SATN MFR SERPL: 17 % — SIGNIFICANT CHANGE UP (ref 15–50)
IRON SATN MFR SERPL: 28 UG/DL — LOW (ref 40–150)
LACTATE SERPL-SCNC: 1.4 MMOL/L — SIGNIFICANT CHANGE UP (ref 0.7–2)
LIPID PNL WITH DIRECT LDL SERPL: 84 MG/DL — SIGNIFICANT CHANGE UP
LYMPHOCYTES # BLD AUTO: 26.3 % — SIGNIFICANT CHANGE UP (ref 13–44)
LYMPHOCYTES # BLD AUTO: 3.08 K/UL — SIGNIFICANT CHANGE UP (ref 1–3.3)
MAGNESIUM SERPL-MCNC: 2.1 MG/DL — SIGNIFICANT CHANGE UP (ref 1.6–2.6)
MCHC RBC-ENTMCNC: 27 PG — SIGNIFICANT CHANGE UP (ref 27–34)
MCHC RBC-ENTMCNC: 31.1 GM/DL — LOW (ref 32–36)
MCV RBC AUTO: 86.7 FL — SIGNIFICANT CHANGE UP (ref 80–100)
MONOCYTES # BLD AUTO: 0.82 K/UL — SIGNIFICANT CHANGE UP (ref 0–0.9)
MONOCYTES NFR BLD AUTO: 7 % — SIGNIFICANT CHANGE UP (ref 2–14)
NEUTROPHILS # BLD AUTO: 7.42 K/UL — HIGH (ref 1.8–7.4)
NEUTROPHILS NFR BLD AUTO: 63.4 % — SIGNIFICANT CHANGE UP (ref 43–77)
NON HDL CHOLESTEROL: 99 MG/DL — SIGNIFICANT CHANGE UP
NRBC # BLD: 0 /100 WBCS — SIGNIFICANT CHANGE UP (ref 0–0)
PHOSPHATE SERPL-MCNC: 2.8 MG/DL — SIGNIFICANT CHANGE UP (ref 2.5–4.5)
PLATELET # BLD AUTO: 198 K/UL — SIGNIFICANT CHANGE UP (ref 150–400)
POTASSIUM SERPL-MCNC: 4.2 MMOL/L — SIGNIFICANT CHANGE UP (ref 3.5–5.3)
POTASSIUM SERPL-SCNC: 4.2 MMOL/L — SIGNIFICANT CHANGE UP (ref 3.5–5.3)
PROT SERPL-MCNC: 6.2 G/DL — SIGNIFICANT CHANGE UP (ref 6–8.3)
PROTHROM AB SERPL-ACNC: 14.9 SEC — HIGH (ref 10.6–13.6)
RBC # BLD: 4.45 M/UL — SIGNIFICANT CHANGE UP (ref 3.8–5.2)
RBC # FLD: 14.6 % — HIGH (ref 10.3–14.5)
SARS-COV-2 IGG SERPL QL IA: NEGATIVE — SIGNIFICANT CHANGE UP
SARS-COV-2 IGM SERPL IA-ACNC: 0.07 INDEX — SIGNIFICANT CHANGE UP
SODIUM SERPL-SCNC: 141 MMOL/L — SIGNIFICANT CHANGE UP (ref 135–145)
SPECIMEN SOURCE: SIGNIFICANT CHANGE UP
TIBC SERPL-MCNC: 166 UG/DL — LOW (ref 250–450)
TRIGL SERPL-MCNC: 76 MG/DL — SIGNIFICANT CHANGE UP
TROPONIN I SERPL-MCNC: 0.1 NG/ML — HIGH (ref 0–0.04)
TSH SERPL-MCNC: 2.33 UU/ML — SIGNIFICANT CHANGE UP (ref 0.34–4.82)
UIBC SERPL-MCNC: 138 UG/DL — SIGNIFICANT CHANGE UP (ref 110–370)
VIT B12 SERPL-MCNC: 535 PG/ML — SIGNIFICANT CHANGE UP (ref 232–1245)
WBC # BLD: 11.71 K/UL — HIGH (ref 3.8–10.5)
WBC # FLD AUTO: 11.71 K/UL — HIGH (ref 3.8–10.5)

## 2021-01-23 PROCEDURE — 93970 EXTREMITY STUDY: CPT | Mod: 26

## 2021-01-23 RX ADMIN — ENOXAPARIN SODIUM 60 MILLIGRAM(S): 100 INJECTION SUBCUTANEOUS at 05:19

## 2021-01-23 RX ADMIN — ENOXAPARIN SODIUM 60 MILLIGRAM(S): 100 INJECTION SUBCUTANEOUS at 17:09

## 2021-01-23 NOTE — DISCHARGE NOTE PROVIDER - CARE PROVIDER_API CALL
Mars Miramontes)  Cardiology  [address] [id number]  Phone: [phone]  Fax: [phone]  Follow Up Time: 1 week    Colt Sosa)  Internal Medicine; Pulmonary Disease  [address] [place] [address]  Phone: [phone]  Fax: [phone]  Follow Up Time: 2 weeks

## 2021-01-23 NOTE — CONSULT NOTE ADULT - SUBJECTIVE AND OBJECTIVE BOX
PULMONARY CONSULT NOTE      SAUL VELASQUEZ  MRN-[id number]    Patient is a 75y old  Female who presents with a chief complaint of HYpoxia (2021 10:10)  Hx chart lab and xrays reviewed, Pt examined, no chest pain or hemoptysis  Pt examined by me       HISTORY OF PRESENT ILLNESS:75F, from  [facility] ECF, wheel chair bound  with PMH of Dementia, right hip fracture in rehab, sent to the emergency department for hypoxia. Patient is a poor historian and all the pertinent medical information was obtained from the Facility papers and ED provider. Daughter in law also added to some collateral information. Pt was noted to be short of breath with increased work of breathing at the facility today. She has a covid testing earlier this week that was negative. Pt in ED was noted to have SPO2- 83% on RA and was then started on 4L NC.  Denies any active smoking, alcohol or any substance abuse.     Home Medications:  FERROUS SULFATE: TAKE ONE TABLET BY MOUTH TWICE A DAY ANEMIA (2021 20:58)      MEDICATIONS  (STANDING):  enoxaparin Injectable 60 milliGRAM(s) SubCutaneous two times a day          Allergies    No Known Allergies          PAST MEDICAL & SURGICAL HISTORY:  Dementia    No pertinent past medical history    ORIF hip   UTI  adm [facility]        FAMILY HISTORY:  HTN --   DM--   IHD --  Asthma--  COPD--     SOCIAL HISTORY SMOKING --   ETOH --    DRUGS--  [facility] Resident    REVIEW OF SYSTEMS: as per staff and Chart  CONSTITUTIONAL: No fever, weight loss, or fatigue   EYES: No eye pain, visual disturbances, or discharge  ENT:  No difficulty hearing, tinnitus, vertigo; No sinus or throat pain  NECK: No pain or stiffness   RESPIRATORY:  cough--   wheezing --  chills--   hemoptysis --   Shortness of Breath++  CARDIOVASCULAR: No chest pain, palpitations, passing out, dizziness, or leg swelling  GASTROINTESTINAL: No abdominal or epigastric pain. No nausea, vomiting, or hematemesis;   GENITOURINARY: No dysuria, frequency, hematuria, or incontinence  NEUROLOGICAL: No headaches, memory loss++, no loss of strength, numbness, or tremors  SKIN: No itching, burning, rashes, or lesions   LYMPH Nodes: No enlarged glands  ENDOCRINE: No heat or cold intolerance; No hair loss  HEME/LYMPH: No easy bruising, or bleeding gums  ALLERGY AND IMMUNOLOGIC: No hives or eczema      Vital Signs Last 24 Hrs  T(C): 36.3 (2021 05:42), Max: 36.7 (2021 00:33)  T(F): 97.4 (2021 05:42), Max: 98.1 (2021 00:33)  HR: 83 (2021 05:42) (83 - 103)  BP: 119/82 (2021 05:42) (110/77 - 147/91)  BP(mean): --  RR: 17 (2021 05:42) (17 - 19)  SpO2: 96% (2021 05:42) (89% - 99%)  I&O's Detail      PHYSICAL EXAMINATION:    GENERAL: The patient is a well-developed, H/F in no apparent distress.   SKIN: No rashes ecchymoses or cyanosis  HEENT: Head is normocephalic and atraumatic. Extraocular muscles are intact.   Neck supple LN not felt, JVP not increased  Thyroid not enlarged  Lymphatic: No lymphadenopathy  Cardiovascular:  S1 S2  heard ,RSR , JVP not increased , systolic  murmur at apex, No  gallop or rub  Respiratory:  Symmetrical chest wall movements Breathing vesicular , Percussion note normal no dulness   with no   rales or  wheeze  ABDOMEN:  Soft, Non-tender,   No  hepatosplenomegaly ,BS positive		  Extremities:  No clubbing, cyanosis or edema , No calf tenderness  Vascular: Peripheral pulses palpable 2+ bilaterally  CNS: Alert and responsive  sensory intact  motor Power 4/5, DTR 2+   Babinski neg    LABS:                        12.0   11.71 )-----------( 198      ( 2021 06:05 )             38.6         141  |  107  |  14  ----------------------------<  95  4.2   |  27  |  0.72    Ca    9.3      2021 06:05  Phos  2.8       Mg     2.1         TPro  6.2  /  Alb  2.5<L>  /  TBili  0.4  /  DBili  x   /  AST  13  /  ALT  16  /  AlkPhos  103      PT/INR - ( 2021 06:05 )   PT: 14.9 sec;   INR: 1.27 ratio         PTT - ( 2021 06:05 )  PTT:33.9 sec  Urinalysis Basic - ( 2021 16:19 )    Color: Yellow / Appearance: Clear / S.020 / pH: x  Gluc: x / Ketone: Negative  / Bili: Negative / Urobili: Negative   Blood: x / Protein: 30 mg/dL / Nitrite: Negative   Leuk Esterase: Negative / RBC: 2-5 /HPF / WBC 0-2 /HPF   Sq Epi: x / Non Sq Epi: Occasional /HPF / Bacteria: Many /HPF        CARDIAC MARKERS ( 2021 06:05 )  0.095 ng/mL / x     / x     / x     / x      CARDIAC MARKERS ( 2021 21:11 )  0.118 ng/mL / x     / x     / x     / x      CARDIAC MARKERS ( 2021 15:59 )  0.131 ng/mL / x     / x     / x     / x          D-Dimer Assay, Quantitative: 4133 ng/mL DDU (21 @ 15:59)    Serum Pro-Brain Natriuretic Peptide: 77705 pg/mL (21 @ 17:02)    Lactate, Blood: 1.4 mmol/L (21 @ 06:06)  Lactate, Blood: 2.0 mmol/L (21 @ 15:59)          RADIOLOGY & ADDITIONAL STUDIES:    CXR: No Infiltrate    Ct scan chest:a< from: CT Angio Chest w/ IV Cont (21 @ 20:17) >    LUNGS AND AIRWAYS: Patent central airways.  Mild scattered linear type atelectasis. No parenchymal consolidation.  PLEURA: No pleural effusion.  MEDIASTINUM AND LETICIA: No lymphadenopathy.  VESSELS: Extensive bilateral distal main, lobar, segmental and subsegmental pulmonary emboli involving all lobes. Atherosclerotic changes of the aorta and coronary arteries.  HEART: Cardiomegaly with enlarged right ventricle and evidence of right heart strain. Trace pericardial effusion.  CHEST WALL AND LOWER NECK: Within normal limits.  VISUALIZED UPPER ABDOMEN: Within normal limits.  BONES: Kyphosis. Degenerative changes. Mild T11 and T12 compression deformities, age indeterminate. Old rib fractures.    IMPRESSION:  Extensive bilateral pulmonary emboli involving all lobes.    Evidence of right heart strain.            ekg;    echo:< from: Transthoracic Echocardiogram (21 @ 07:32) >  1. Normal mitral valve. Trace mitral regurgitation.  2. Normal trileaflet aortic valve. Mild aortic  regurgitation.  3. Aortic Root: 3.4 cm.    4. Normal left atrium.  LA volume index = 24 cc/m2.  5. Normal left ventricular internal dimensions and wall  thicknesses.  6. Mild global left ventricular systolic dysfunction.  7. Normal right atrium.A moble 2.4cm  by 1 cm  echodense  structuree seen most likely representing clot in transit.  8. Normal right ventricular size and function. Nova's  sign is noted (RV hypokinesis with apical sparing),  suspicious for pulmonary embolism.(TAPSE 2.1cm,tissue  doppler .10m/s)  9. RV systolic pressure is moderately increased at  46 mm  Hg.  10. There is moderate tricuspid regurgitation.  11. There is mild pulmonic regurgitation.  12. Small pericardial effusion.       PULMONARY CONSULT NOTE      SAUL VELASQUEZ  MRN-[id number]    Patient is a 75y old  Female who presents with a chief complaint of HYpoxia (2021 10:10)  Hx chart lab and xrays reviewed, Pt examined, no chest pain or hemoptysis  Pt examined by me       HISTORY OF PRESENT ILLNESS:75F, from  [facility] ECF, wheel chair bound  with PMH of Dementia, right hip fracture in rehab, sent to the emergency department for hypoxia. Patient is a poor historian and all the pertinent medical information was obtained from the Facility papers and ED provider. Daughter in law also added to some collateral information. Pt was noted to be short of breath with increased work of breathing at the facility today. She has a covid testing earlier this week that was negative. Pt in ED was noted to have SPO2- 83% on RA and was then started on 4L NC.  Denies any active smoking, alcohol or any substance abuse.     Home Medications:  FERROUS SULFATE: TAKE ONE TABLET BY MOUTH TWICE A DAY ANEMIA (2021 20:58)      MEDICATIONS  (STANDING):  enoxaparin Injectable 60 milliGRAM(s) SubCutaneous two times a day          Allergies    No Known Allergies          PAST MEDICAL & SURGICAL HISTORY:  Dementia    No pertinent past medical history    ORIF hip   UTI  adm [facility]        FAMILY HISTORY:  HTN --   DM--   IHD --  Asthma--  COPD--     SOCIAL HISTORY SMOKING --   ETOH --    DRUGS--  [facility] Resident    REVIEW OF SYSTEMS: as per staff and Chart  CONSTITUTIONAL: No fever, weight loss, or fatigue   EYES: No eye pain, visual disturbances, or discharge  ENT:  No difficulty hearing, tinnitus, vertigo; No sinus or throat pain  NECK: No pain or stiffness   RESPIRATORY:  cough--   wheezing --  chills--   hemoptysis --   Shortness of Breath++  CARDIOVASCULAR: No chest pain, palpitations, passing out, dizziness, or leg swelling  GASTROINTESTINAL: No abdominal or epigastric pain. No nausea, vomiting, or hematemesis;   GENITOURINARY: No dysuria, frequency, hematuria, or incontinence  NEUROLOGICAL: No headaches, memory loss++, no loss of strength, numbness, or tremors  SKIN: No itching, burning, rashes, or lesions   LYMPH Nodes: No enlarged glands  ENDOCRINE: No heat or cold intolerance; No hair loss  HEME/LYMPH: No easy bruising, or bleeding gums  ALLERGY AND IMMUNOLOGIC: No hives or eczema      Vital Signs Last 24 Hrs  T(C): 36.3 (2021 05:42), Max: 36.7 (2021 00:33)  T(F): 97.4 (2021 05:42), Max: 98.1 (2021 00:33)  HR: 83 (2021 05:42) (83 - 103)  BP: 119/82 (2021 05:42) (110/77 - 147/91)  BP(mean): --  RR: 17 (2021 05:42) (17 - 19)  SpO2: 96% (2021 05:42) (89% - 99%)  I&O's Detail      PHYSICAL EXAMINATION:    GENERAL: The patient is a thin pleasant H/F in no apparent distress.   SKIN: No rashes ecchymoses or cyanosis  HEENT: Head is normocephalic and atraumatic. Extraocular muscles are intact.   Neck supple LN not felt, JVP not increased  Thyroid not enlarged  Lymphatic: No lymphadenopathy  Cardiovascular:  S1 S2  heard ,RSR , JVP not increased , systolic  murmur at apex, No  gallop or rub  Respiratory:  Symmetrical chest wall movements Breathing vesicular , Percussion note normal no dulness   with no   rales or  wheeze  ABDOMEN:  Soft, Non-tender,   No  hepatosplenomegaly ,BS positive		  Extremities:  No clubbing, cyanosis or edema , No calf tenderness  Vascular: Peripheral pulses palpable 2+ bilaterally  CNS: Alert and responsive,  sensory intact  motor Power 5/5, DTR 2+   Babinski neg    LABS:                        12.0   11.71 )-----------( 198      ( 2021 06:05 )             38.6         141  |  107  |  14  ----------------------------<  95  4.2   |  27  |  0.72    Ca    9.3      2021 06:05  Phos  2.8       Mg     2.1         TPro  6.2  /  Alb  2.5<L>  /  TBili  0.4  /  DBili  x   /  AST  13  /  ALT  16  /  AlkPhos  103      PT/INR - ( 2021 06:05 )   PT: 14.9 sec;   INR: 1.27 ratio         PTT - ( 2021 06:05 )  PTT:33.9 sec  Urinalysis Basic - ( 2021 16:19 )    Color: Yellow / Appearance: Clear / S.020 / pH: x  Gluc: x / Ketone: Negative  / Bili: Negative / Urobili: Negative   Blood: x / Protein: 30 mg/dL / Nitrite: Negative   Leuk Esterase: Negative / RBC: 2-5 /HPF / WBC 0-2 /HPF   Sq Epi: x / Non Sq Epi: Occasional /HPF / Bacteria: Many /HPF        CARDIAC MARKERS ( 2021 06:05 )  0.095 ng/mL / x     / x     / x     / x      CARDIAC MARKERS ( 2021 21:11 )  0.118 ng/mL / x     / x     / x     / x      CARDIAC MARKERS ( 2021 15:59 )  0.131 ng/mL / x     / x     / x     / x          D-Dimer Assay, Quantitative: 4133 ng/mL DDU (21 @ 15:59)    Serum Pro-Brain Natriuretic Peptide: 71719 pg/mL (21 @ 17:02)    Lactate, Blood: 1.4 mmol/L (21 @ 06:06)  Lactate, Blood: 2.0 mmol/L (21 @ 15:59)          RADIOLOGY & ADDITIONAL STUDIES:    CXR: No Infiltrate    Ct scan chest:a< from: CT Angio Chest w/ IV Cont (21 @ 20:17) >    LUNGS AND AIRWAYS: Patent central airways.  Mild scattered linear type atelectasis. No parenchymal consolidation.  PLEURA: No pleural effusion.  MEDIASTINUM AND LETICIA: No lymphadenopathy.  VESSELS: Extensive bilateral distal main, lobar, segmental and subsegmental pulmonary emboli involving all lobes. Atherosclerotic changes of the aorta and coronary arteries.  HEART: Cardiomegaly with enlarged right ventricle and evidence of right heart strain. Trace pericardial effusion.  CHEST WALL AND LOWER NECK: Within normal limits.  VISUALIZED UPPER ABDOMEN: Within normal limits.  BONES: Kyphosis. Degenerative changes. Mild T11 and T12 compression deformities, age indeterminate. Old rib fractures.    IMPRESSION:  Extensive bilateral pulmonary emboli involving all lobes.    Evidence of right heart strain.            ekg; Sinus Tach, I/C RBBB, LAD, Poor r wave progression v1-v4    echo:< from: Transthoracic Echocardiogram (21 @ 07:32) >  1. Normal mitral valve. Trace mitral regurgitation.  2. Normal trileaflet aortic valve. Mild aortic  regurgitation.  3. Aortic Root: 3.4 cm.    4. Normal left atrium.  LA volume index = 24 cc/m2.  5. Normal left ventricular internal dimensions and wall  thicknesses.  6. Mild global left ventricular systolic dysfunction.  7. Normal right atrium.A moble 2.4cm  by 1 cm  echodense  structuree seen most likely representing clot in transit.  8. Normal right ventricular size and function. Nova's  sign is noted (RV hypokinesis with apical sparing),  suspicious for pulmonary embolism.(TAPSE 2.1cm,tissue  doppler .10m/s)  9. RV systolic pressure is moderately increased at  46 mm  Hg.  10. There is moderate tricuspid regurgitation.  11. There is mild pulmonic regurgitation.  12. Small pericardial effusion.

## 2021-01-23 NOTE — CONSULT NOTE ADULT - ASSESSMENT
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

## 2021-01-23 NOTE — PROGRESS NOTE ADULT - ASSESSMENT
75F, from  [facility] ECF, wheel chair bound  with PMH of Dementia, right hip fracture in rehab, sent to the emergency department for hypoxia.  CTA- Extensive bilateral pulmonary emboli involving all lobes.  Evidence of right heart strain.  Covid- negative         Problem/Plan - 1:  ·  Problem: Pulmonary emboli.  Plan: Pt sent from facility for hypoxia and increased work of breathing noted today  CXR- Clear lungs at this time. Heart enlargement again noted .WBC- 17.5  Trops 0.13  EKG - sinus tachycardia   CTA- Extensive bilateral pulmonary emboli involving all lobes.  Evidence of right heart strain.  TTE RA thrombus  Covid- negative   Started on lovenox Full dose      Problem/Plan - 2:  ·  Problem: Sepsis.  Plan: Pt  noted to be hypothermic, tachycardic with WBC 17K   no clear source of infection noted   CXR- no evidence of any infiltrates  S/P 1 L NS bolus in ED and Ceftraixone   c/w rocephin   f/u ucx and bd cx.     Problem/Plan - 3:  ·  Problem: Dementia.  Plan: Pt has PMH of dementia  Not on any medication as per NH papers  f/u folate and B12.     Problem/Plan - 4:  ·  Problem: Hip fracture, right.  Plan: Pt has  chronic Hip fracture   xray pelvis- Old. Fractures around the low central pelvis. Incidental fibroid.  Pt was wheel chair bound.    Problem/Plan - 5:  ·  Problem: Prophylactic measure.  Plan: IMPROVE VTE Individual Risk Assessment    RISK                                                          Points  [] Previous VTE                                           3  [] Thrombophilia                                        2  [] Lower limb paralysis                              2   [] Current Cancer                                       2   [x] Immobilization > 24 hrs                        1  [x] ICU/CCU stay > 24 hours                       1  [x] Age > 60                                                   1    IMPROVE VTE Score:  FD lovenox for PE   PPI.

## 2021-01-23 NOTE — DISCHARGE NOTE PROVIDER - HOSPITAL COURSE
75 year old Woman with hx of Dementia, wheelchair dependent, total hip arthroplasty; s/p right proximal femoral periprosthetic fracture in the intertrochanteric/subtrochanteric region due to mechanical fall on 12/15; discharged to rehab on 12/18; who on 1/23 presented from rehab to [facility] emergency department for hypoxia; found to have PE on CTA and RA thrombus on TTE for which full dose lovenox was initiated.           INCOMPLETE DISCHARGE NOTE: TO BE COMPLETED AT DISCHARGE. THNX 75 year old Woman with hx of Dementia, wheelchair dependent, total hip arthroplasty; s/p right proximal femoral periprosthetic fracture in the intertrochanteric/subtrochanteric region due to mechanical fall on 12/15; discharged to rehab on 12/18; who [facility] 83% on room air ; found to have extensive b/l PE involving all lobes on CTA and RA thrombus on TTE for which full dose lovenox was initiated. Venous US shows positive for acute DVT on left leg (above and below the knee). denies leg pain. Leukocytosis resolved. CXR result clear lungs. Patient is saturating 96% on room air. Full dose lovenox switched to Eliquis 10mg BID x 3 days then continue 5mg BID for 6 months. Patient was evaluated by PT and recommended [facility]. Last COVID 19  PCR negative on 1/25/21.  Patient is medically optimized for discharge to [facility]. This is a brief summary of hospitalization. Please refer to medical records for completed hospital course. Discussed with discharge plan with attending physician.

## 2021-01-23 NOTE — DISCHARGE NOTE PROVIDER - NSDCCPCAREPLAN_GEN_ALL_CORE_FT
PRINCIPAL DISCHARGE DIAGNOSIS  Diagnosis: Multiple pulmonary emboli  Assessment and Plan of Treatment: Take your anticoagulation (lovenox, coumadin) as directed.  Follow up with your health care provider within one week.  If you develop shortness of breath or if your shortness of breath worsens report this immediately to your Health Care Provider or go to the Emergency Department.         PRINCIPAL DISCHARGE DIAGNOSIS  Diagnosis: Multiple pulmonary emboli  Assessment and Plan of Treatment: CT angiogram chest shows Extensive bilateral pulmonary emboli involving all lobes. Evidence of right heart strain. You were treated wih full dose Lovenox, switched to oral aticoagulation agent.   Take your anticoagulation   as directed.  Follow up with your facility MD within one week.  If you develop shortness of breath or if your shortness of breath worsens report this immediately to your Health Care Provider or go to the Emergency Department. monitor bleeding, blood work.         SECONDARY DISCHARGE DIAGNOSES  Diagnosis: Thrombus  Assessment and Plan of Treatment: TTE resut shows Right atrium representing clot in transit. continue oral anticoagulation agent. See plan for pulmonary emboli.  Follow up with facility MD.    Diagnosis: DVT (deep venous thrombosis)  Assessment and Plan of Treatment: Venous doppler US shows Left leg deep vein thrombosis, acute deep venous thrombosis: above and below the knee. See plan for pulmonary emboli. Follow up with facility MD.    Diagnosis: Fungal dermatitis  Assessment and Plan of Treatment: Monitored for fungal rash to lower back, perineum site. Apply antifungal agent to affected site. Perineum care. Maintain skin dry and clean. Follow up with facility MD at [facility].    Diagnosis: Hip fracture, right  Assessment and Plan of Treatment: History of right hip fracture before this admission. continue daily PT at rehab facility. continue pain managment if needed. Fall prevention.    Diagnosis: Vitamin D deficiency  Assessment and Plan of Treatment: continue vitamin d supplement. follow up with facility MD to repeat blood work.    Diagnosis: Dementia  Assessment and Plan of Treatment: continue supportive care. Assist with eating. nutrition follow up.   Continue mechanical soft diet. Fall prevention. Activity as tolerated.   Maintain skin integrity. Follow up with facility MD at [facility].

## 2021-01-23 NOTE — DISCHARGE NOTE PROVIDER - NSDCMRMEDTOKEN_GEN_ALL_CORE_FT
acetaminophen 500 mg oral tablet: 2 tab(s) orally every 8 hours, As Needed -for mild pain   FERROUS SULFATE: TAKE ONE TABLET BY MOUTH TWICE A DAY ANEMIA  oxyCODONE 5 mg oral tablet: 1 tab(s) orally every 6 hours, As Needed -Severe Pain (7 - 10) - for severe pain MDD:4 tablets  senna oral tablet: 2 tab(s) orally once a day (at bedtime)   acetaminophen 500 mg oral tablet: 2 tab(s) orally every 8 hours, As Needed -for mild pain   apixaban 5 mg oral tablet: 2 tab(s) orally every 12 hours for 2 days  apixaban 5 mg oral tablet: 1 tab(s) orally every 12 hours start on 1/28/21 for 6 months  cholecalciferol oral tablet: 1000 unit(s) orally once a day  FERROUS SULFATE: TAKE ONE TABLET BY MOUTH TWICE A DAY ANEMIA  nystatin 100,000 units/g topical powder: 1 application topically 2 times a day  oxyCODONE 5 mg oral tablet: 1 tab(s) orally every 6 hours, As Needed -Severe Pain (7 - 10) - for severe pain MDD:4 tablets  senna oral tablet: 2 tab(s) orally once a day (at bedtime)

## 2021-01-23 NOTE — DISCHARGE NOTE PROVIDER - PROVIDER TOKENS
PROVIDER:[TOKEN:[[id number]],FOLLOWUP:[1 week]],PROVIDER:[TOKEN:[[id number]],FOLLOWUP:[2 weeks]]

## 2021-01-24 LAB
ANION GAP SERPL CALC-SCNC: 7 MMOL/L — SIGNIFICANT CHANGE UP (ref 5–17)
BUN SERPL-MCNC: 14 MG/DL — SIGNIFICANT CHANGE UP (ref 7–18)
CALCIUM SERPL-MCNC: 9.1 MG/DL — SIGNIFICANT CHANGE UP (ref 8.4–10.5)
CHLORIDE SERPL-SCNC: 106 MMOL/L — SIGNIFICANT CHANGE UP (ref 96–108)
CO2 SERPL-SCNC: 28 MMOL/L — SIGNIFICANT CHANGE UP (ref 22–31)
CREAT SERPL-MCNC: 0.74 MG/DL — SIGNIFICANT CHANGE UP (ref 0.5–1.3)
GLUCOSE SERPL-MCNC: 92 MG/DL — SIGNIFICANT CHANGE UP (ref 70–99)
HCT VFR BLD CALC: 41 % — SIGNIFICANT CHANGE UP (ref 34.5–45)
HGB BLD-MCNC: 12.5 G/DL — SIGNIFICANT CHANGE UP (ref 11.5–15.5)
MAGNESIUM SERPL-MCNC: 2 MG/DL — SIGNIFICANT CHANGE UP (ref 1.6–2.6)
MCHC RBC-ENTMCNC: 26.6 PG — LOW (ref 27–34)
MCHC RBC-ENTMCNC: 30.5 GM/DL — LOW (ref 32–36)
MCV RBC AUTO: 87.2 FL — SIGNIFICANT CHANGE UP (ref 80–100)
NRBC # BLD: 0 /100 WBCS — SIGNIFICANT CHANGE UP (ref 0–0)
PHOSPHATE SERPL-MCNC: 3.2 MG/DL — SIGNIFICANT CHANGE UP (ref 2.5–4.5)
PLATELET # BLD AUTO: 200 K/UL — SIGNIFICANT CHANGE UP (ref 150–400)
POTASSIUM SERPL-MCNC: 4.1 MMOL/L — SIGNIFICANT CHANGE UP (ref 3.5–5.3)
POTASSIUM SERPL-SCNC: 4.1 MMOL/L — SIGNIFICANT CHANGE UP (ref 3.5–5.3)
RBC # BLD: 4.7 M/UL — SIGNIFICANT CHANGE UP (ref 3.8–5.2)
RBC # FLD: 14.4 % — SIGNIFICANT CHANGE UP (ref 10.3–14.5)
SODIUM SERPL-SCNC: 141 MMOL/L — SIGNIFICANT CHANGE UP (ref 135–145)
WBC # BLD: 10.06 K/UL — SIGNIFICANT CHANGE UP (ref 3.8–10.5)
WBC # FLD AUTO: 10.06 K/UL — SIGNIFICANT CHANGE UP (ref 3.8–10.5)

## 2021-01-24 RX ADMIN — ENOXAPARIN SODIUM 60 MILLIGRAM(S): 100 INJECTION SUBCUTANEOUS at 05:54

## 2021-01-24 RX ADMIN — ENOXAPARIN SODIUM 60 MILLIGRAM(S): 100 INJECTION SUBCUTANEOUS at 16:17

## 2021-01-24 NOTE — PHYSICAL THERAPY INITIAL EVALUATION ADULT - MANUAL MUSCLE TESTING RESULTS, REHAB EVAL
BUE 4+/5. Rt. hip 3-/5, Lt. hip 4-/5. Rt knee and ankle at least 3/4. L.t hips 3+/5, Lt. knees 4/5. Lt. ankle at leas t3+/5 functionally

## 2021-01-24 NOTE — PHYSICAL THERAPY INITIAL EVALUATION ADULT - GENERAL OBSERVATIONS, REHAB EVAL
Consult received, chart reviewed. Patient received supine in bed, NAD.. Patient agreed to EVALUATION from Physical Therapist.

## 2021-01-24 NOTE — PHYSICAL THERAPY INITIAL EVALUATION ADULT - LIVES WITH, PROFILE
Currently from Rehab. Prior to 12/2020 was living with spouse in house with stairs to access (pt unsure of how many

## 2021-01-24 NOTE — PHYSICAL THERAPY INITIAL EVALUATION ADULT - NS ASR WT BEARING DETAIL RLE
secondary to R proximal femoral periprosthetic fracture in 12/2020. As per Rehab documentation/toe touch weight-bearing

## 2021-01-24 NOTE — PHYSICAL THERAPY INITIAL EVALUATION ADULT - ACTIVE RANGE OF MOTION EXAMINATION, REHAB EVAL
except Rt hips ~1/2 range/bilateral upper extremity Active ROM was WFL (within functional limits)/bilateral  lower extremity Active ROM was WFL (within functional limits)

## 2021-01-25 DIAGNOSIS — I82.90 ACUTE EMBOLISM AND THROMBOSIS OF UNSPECIFIED VEIN: ICD-10-CM

## 2021-01-25 DIAGNOSIS — I82.409 ACUTE EMBOLISM AND THROMBOSIS OF UNSPECIFIED DEEP VEINS OF UNSPECIFIED LOWER EXTREMITY: ICD-10-CM

## 2021-01-25 DIAGNOSIS — Z02.9 ENCOUNTER FOR ADMINISTRATIVE EXAMINATIONS, UNSPECIFIED: ICD-10-CM

## 2021-01-25 LAB
ANION GAP SERPL CALC-SCNC: 7 MMOL/L — SIGNIFICANT CHANGE UP (ref 5–17)
BUN SERPL-MCNC: 16 MG/DL — SIGNIFICANT CHANGE UP (ref 7–18)
CALCIUM SERPL-MCNC: 9.1 MG/DL — SIGNIFICANT CHANGE UP (ref 8.4–10.5)
CHLORIDE SERPL-SCNC: 107 MMOL/L — SIGNIFICANT CHANGE UP (ref 96–108)
CO2 SERPL-SCNC: 28 MMOL/L — SIGNIFICANT CHANGE UP (ref 22–31)
CREAT SERPL-MCNC: 0.71 MG/DL — SIGNIFICANT CHANGE UP (ref 0.5–1.3)
GLUCOSE SERPL-MCNC: 96 MG/DL — SIGNIFICANT CHANGE UP (ref 70–99)
HCT VFR BLD CALC: 38.3 % — SIGNIFICANT CHANGE UP (ref 34.5–45)
HGB BLD-MCNC: 11.9 G/DL — SIGNIFICANT CHANGE UP (ref 11.5–15.5)
MCHC RBC-ENTMCNC: 26.7 PG — LOW (ref 27–34)
MCHC RBC-ENTMCNC: 31.1 GM/DL — LOW (ref 32–36)
MCV RBC AUTO: 86.1 FL — SIGNIFICANT CHANGE UP (ref 80–100)
NRBC # BLD: 0 /100 WBCS — SIGNIFICANT CHANGE UP (ref 0–0)
PHOSPHATE SERPL-MCNC: 3.3 MG/DL — SIGNIFICANT CHANGE UP (ref 2.5–4.5)
PLATELET # BLD AUTO: 214 K/UL — SIGNIFICANT CHANGE UP (ref 150–400)
POTASSIUM SERPL-MCNC: 3.9 MMOL/L — SIGNIFICANT CHANGE UP (ref 3.5–5.3)
POTASSIUM SERPL-SCNC: 3.9 MMOL/L — SIGNIFICANT CHANGE UP (ref 3.5–5.3)
RBC # BLD: 4.45 M/UL — SIGNIFICANT CHANGE UP (ref 3.8–5.2)
RBC # FLD: 14.4 % — SIGNIFICANT CHANGE UP (ref 10.3–14.5)
SODIUM SERPL-SCNC: 142 MMOL/L — SIGNIFICANT CHANGE UP (ref 135–145)
WBC # BLD: 8.96 K/UL — SIGNIFICANT CHANGE UP (ref 3.8–10.5)
WBC # FLD AUTO: 8.96 K/UL — SIGNIFICANT CHANGE UP (ref 3.8–10.5)

## 2021-01-25 RX ORDER — APIXABAN 2.5 MG/1
5 TABLET, FILM COATED ORAL EVERY 12 HOURS
Refills: 0 | Status: CANCELLED | OUTPATIENT
Start: 2021-01-28 | End: 2021-01-26

## 2021-01-25 RX ORDER — APIXABAN 2.5 MG/1
10 TABLET, FILM COATED ORAL EVERY 12 HOURS
Refills: 0 | Status: DISCONTINUED | OUTPATIENT
Start: 2021-01-25 | End: 2021-01-26

## 2021-01-25 RX ADMIN — ENOXAPARIN SODIUM 60 MILLIGRAM(S): 100 INJECTION SUBCUTANEOUS at 05:52

## 2021-01-25 RX ADMIN — ENOXAPARIN SODIUM 60 MILLIGRAM(S): 100 INJECTION SUBCUTANEOUS at 16:07

## 2021-01-25 NOTE — PROGRESS NOTE ADULT - PROBLEM SELECTOR PLAN 6
-Pt has  chronic Hip fracture   -xray pelvis- Old. Fractures around the low central pelvis. Incidental fibroid.  -Pt was wheel chair bound

## 2021-01-25 NOTE — PROGRESS NOTE ADULT - PROBLEM SELECTOR PLAN 1
-Pt sent from facility for hypoxia and increased work of breathing   -CXR- Clear lungs at this time. Heart enlargement again noted  -leukocytosis resolved.   -Trops 0.13---> 0.118---> 0.095  -EKG - sinus tachycardia   -CTA- Extensive bilateral pulmonary emboli involving all lobes. Evidence of right heart strain.  -Echo shows Right atrium, moble 2.4cm by 1cm echodense structureee seen most likely representing clot in transit.   -Covid- negative   -changed lovenox Full dose to Eliquis as per Dr. Miramontes

## 2021-01-25 NOTE — PROGRESS NOTE ADULT - PROBLEM SELECTOR PLAN 4
-Pt  noted to be hypothermic, tachycardic with WBC 17K resolved now.  -no clear source of infection noted   -CXR- no evidence of any infiltrates  -S/P 1 L NS bolus in ED and Ceftraixone x1   -ucx and bd cx NGTD

## 2021-01-25 NOTE — PROGRESS NOTE ADULT - PROBLEM SELECTOR PLAN 3
-Echo shows Right atrium, moble 2.4cm by 1cm echodense structureee seen most likely representing clot in transit.   -Cardiology dr. Miramontes  -Full dose lovenox changed to Eliquis 10mg BID x 3 days then 5mg BID per dr. Miramontes.

## 2021-01-25 NOTE — ADVANCED PRACTICE NURSE CONSULT - ASSESSMENT
This is a 75yr old female patient admitted for Hypoxemia, presenting with the following:  -There is evidence of Bilateral Gluteal scabbed Abrasions  -There is evidence of Perianal and Gluteal Fold Incontinence Associated Dermatitis  -There is evidence of a fungal Rash to the patients Back

## 2021-01-25 NOTE — PROGRESS NOTE ADULT - ATTENDING COMMENTS
Patient was seen and examined by me on 01/24/2021,interim events noted,labs and radiology studies reviewed.  Mars Miramontes MD,FACC.  [address] [place] [address].  [id number]. [phone]
Patient was seen and examined by me on 01/23/2021,interim events noted,labs and radiology studies reviewed.  Mars Miramontes MD,FACC.  [address] [place] [address].  [id number]. [phone]
I have examined pt personally Hx chart lab and xrays reviewed and pt discussed with residents

## 2021-01-25 NOTE — PROGRESS NOTE ADULT - PROBLEM SELECTOR PLAN 8
-d/c to [facility], came from [facility] center but family refused to send to [facility] following

## 2021-01-25 NOTE — PROGRESS NOTE ADULT - PROBLEM SELECTOR PLAN 2
-Venous doppler shows deep vein thrombosis involving the left mid and distal femoral vein, popliteal vein and posterior tibial vein.  -d/c'd Lovenox full dose  -c/w Eliquis 10mg BID x 3 days then 5mg BID per cardiology dr. Miramontes

## 2021-01-25 NOTE — PROGRESS NOTE ADULT - SUBJECTIVE AND OBJECTIVE BOX
DATE OF SERVICE:   01/23/2021 Patient was seen,examined and evaluated  by me.ER evaluation, Labs and Hospital course was reviewed,    CHIEF COMPLAINT:Dyspnea    HPI:75F, from  [facility], wheel chair bound  with PMH of Dementia, right hip fracture in rehab, sent to the emergency department for hypoxia. Patient is a poor historian and all the pertinent medical information was obtained from the Facility papers and ED provider. Daughter in law also added to some collateral information. Pt was noted to be short of breath with increased work of breathing at the facility today. She has a covid testing earlier this week that was negative. Pt in ED was noted to have SPO2- 83% on RA and was then started on 4L NC.  Denies any active smoking, alcohol or any substance abuse.     In the ED,   Pt is AA0X , confused,   Vitals- 96.1, HR 98, /01, SPO2- 98% on 4L  CXR- Clear lungs at this time. Heart enlargement again noted .WBC- 17.5  Trops 0.13  EKG - sinus tachycardia   CTA- Extensive bilateral pulmonary emboli involving all lobes.  Evidence of right heart strain.  Covid- negative    (22 Jan 2021 19:21)      PAST MEDICAL & SURGICAL HISTORY:  Dementia    No pertinent past medical history    No significant past surgical history        MEDICATIONS  (STANDING):  enoxaparin Injectable 60 milliGRAM(s) SubCutaneous two times a day      FAMILY HISTORY:  No family history of premature coronary artery disease or sudden cardiac death    SOCIAL HISTORY:  Smoking-[ ] Active  [ ] Former [x ] Non Smoker  Alcohol-[ x] Denies [ ] Social [ ] Daily  Ilicit Drug use-[x ] Denies [ ] Active user    REVIEW OF SYSTEMS:  Constitutional: [ ] fever, [ ]weight loss, [x ]fatigue   Activity [x ] Bedbound,[ ] Ambulates [ ] Unassisted[ ] Cane/Walker [ ] Assistence.  Effort tolerance:[ ] Excellent [ ] Good [ ] Fair [ ] Poor [x ]  Eyes: [ ] visual changes  Respiratory: [x ]shortness of breath;  [ ] cough, [ ]wheezing, [ ]chills, [ ]hemoptysis  Cardiovascular: [ ] chest pain, [ ]palpitations, [ ]dizziness,  [ ]leg swelling[ ]orthopnea [ ]PND  Gastrointestinal: [ ] abdominal pain, [ ]nausea, [ ]vomiting,  [ ]diarrhea,[ ]constipation  Genitourinary: [ ] dysuria, [ ] hematuria  Neurologic: [ ] headaches [ ] tremors[ ] weakness  Skin: [ ] itching, [ ]burning, [ ] rashes  Endocrine: [ ] heat or cold intolerance  Musculoskeletal: [ ] joint pain or swelling; [ ] muscle, back, or extremity pain  Psychiatric: [ ] depression, [ ]anxiety, [ ]mood swings, or [ ]difficulty sleeping  Hematologic: [ ] easy bruising, [ ] bleeding gums       [ x] All others negative	  [ ] Unable to obtain    Vital Signs Last 24 Hrs  T(C): 36.3 (23 Jan 2021 05:42), Max: 36.7 (23 Jan 2021 00:33)  T(F): 97.4 (23 Jan 2021 05:42), Max: 98.1 (23 Jan 2021 00:33)  HR: 83 (23 Jan 2021 05:42) (83 - 103)  BP: 119/82 (23 Jan 2021 05:42) (110/77 - 147/91)  RR: 17 (23 Jan 2021 05:42) (17 - 19)  SpO2: 96% (23 Jan 2021 05:42) (89% - 99%)  I&O's Summary      PHYSICAL EXAM:  General: No acute distress BMI-24  HEENT: EOMI, PERRL[ ] Icteric  Neck: Supple, No JVD  Lungs: Equal air entry bilaterally; [ ] Rales [ ] Rhonchi [ ] Wheezing  Heart: Regular rate and rhythm;[x ] Murmurs-  2 /6 [x ] Systolic [ ] Diastolic [ ] Radiation,No rubs, or gallops  Abdomen: Nontender, bowel sounds present  Extremities: No clubbing, cyanosis, or edema[ ] Calf tenderness  Nervous system:  Alert & Oriented X3, no focal deficits  Psychiatric: Normal affect  Skin: No rashes or lesions      LABS:  01-23    141  |  107  |  14  ----------------------------<  95  4.2   |  27  |  0.72    Ca    9.3      23 Jan 2021 06:05  Phos  2.8     01-23  Mg     2.1     01-23    TPro  6.2  /  Alb  2.5<L>  /  TBili  0.4  /  DBili  x   /  AST  13  /  ALT  16  /  AlkPhos  103  01-23    Creatinine Trend: 0.72<--, 0.89<--                        12.0   11.71 )-----------( 198      ( 23 Jan 2021 06:05 )             38.6     PT/INR - ( 23 Jan 2021 06:05 )   PT: 14.9 sec;   INR: 1.27 ratio         PTT - ( 23 Jan 2021 06:05 )  PTT:33.9 sec    Lipid Panel: Cholesterol, Serum 153  HDL Cholesterol, Serum 54  Triglycerides, Serum 76    Cardiac Enzymes: CARDIAC MARKERS ( 23 Jan 2021 06:05 )  0.095 ng/mL / x     / x     / x     / x      CARDIAC MARKERS ( 22 Jan 2021 21:11 )  0.118 ng/mL / x     / x     / x     / x      CARDIAC MARKERS ( 22 Jan 2021 15:59 )  0.131 ng/mL / x     / x     / x     / x          Serum Pro-Brain Natriuretic Peptide: 47085 pg/mL (01-22-21 @ 17:02)    D-Dimer Assay, Quantitative (01.22.21 @ 15:59)   D-Dimer Assay, Quantitative: 4133 ng/mL DDU     RADIOLOGY: CT ANGIO CHEST (W)AW IC      IMPRESSION:  Extensive bilateral pulmonary emboli involving all lobes.  Evidence of right heart strain.      TELEMETRY:Sinus rhythm    ECHO:Prelim: Right heart thrombus noted  
DATE OF SERVICE:01/24/2021  Patient was seen and examined ,interim events noted.Consultant notes ,Labs,Telemetry reviewed by me    PRESENTING CC:Dyspnea    HPI and HOSPITAL COURSE: HPI:  75F, from  [facility], wheel chair bound  with PMH of Dementia, right hip fracture in rehab, sent to the emergency department for hypoxia. Patient is a poor historian and all the pertinent medical information was obtained from the Facility papers and ED provider. Daughter in law also added to some collateral information. Pt was noted to be short of breath with increased work of breathing at the facility today. She has a covid testing earlier this week that was negative. Pt in ED was noted to have SPO2- 83% on RA and was then started on 4L NC.  Denies any active smoking, alcohol or any substance abuse.     In the ED,   Pt is AA0X , confused,   Vitals- 96.1, HR 98, /01, SPO2- 98% on 4L  CXR- Clear lungs at this time. Heart enlargement again noted .WBC- 17.5  Trops 0.13  EKG - sinus tachycardia   CTA- Extensive bilateral pulmonary emboli involving all lobes.  Evidence of right heart strain.  Covid- negative    (22 Jan 2021 19:21)      INTERIM EVENTS:Awake no chest pain LE dopplers-Acute DVT on Lovenox full dose      PMH -reviewed admission note, no change since admission  Heart Failure: Acute [ ] Chronic [ ] Acute on Chronic [ ] Diastolic [ ] Systolic [ ] Combined Systolic and Diastolic[ ]  MAYRA[ ]  ATN[ ]  CKD I [ ] CKDII [ ] CKD III [ ] CKD IV [ ] CKD V [ ] ESRD[ ]  HTN[ ] CVA[ ] DM[ ] COPD[ ] COVID[ ] AF[ ]  PPM[ ] ICD[ ]    MEDICATIONS  (STANDING):  enoxaparin Injectable 60 milliGRAM(s) SubCutaneous two times a day            REVIEW OF SYSTEMS:  Constitutional: [ ] fever, [ ]weight loss,  [ ]fatigue  Eyes: [ ] visual changes  Respiratory: [ ]shortness of breath;  [ ] cough, [ ]wheezing, [ ]chills, [ ]hemoptysis  Cardiovascular: [ ] chest pain, [ ]palpitations, [ ]dizziness,  [ ]leg swelling[ ]orthopnea[ ]PND  Gastrointestinal: [ ] abdominal pain, [ ]nausea, [ ]vomiting,  [ ]diarrhea [ ]Constipation [ ]Melena  Genitourinary: [ ] dysuria, [ ] hematuria [ ]Meza  Neurologic: [ ] headaches [ ] tremors[ ]weakness [ ]Paralysis Right[ ] Left[ ]  Skin: [ ] itching, [ ]burning, [ ] rashes  Endocrine: [ ] heat or cold intolerance  Musculoskeletal: [ ] joint pain or swelling; [ ] muscle, back, or extremity pain  Psychiatric: [ ] depression, [ ]anxiety, [ ]mood swings, or [ ]difficulty sleeping  Hematologic: [ ] easy bruising, [ ] bleeding gums    [x] All remaining systems negative except as per above.   [ ]Unable to obtain.    Vital Signs Last 24 Hrs  T(C): 36.6 (24 Jan 2021 05:11), Max: 37.1 (23 Jan 2021 18:37)  T(F): 97.9 (24 Jan 2021 05:11), Max: 98.8 (23 Jan 2021 18:37)  HR: 80 (24 Jan 2021 05:11) (80 - 90)  BP: 143/83 (24 Jan 2021 05:11) (120/84 - 143/83)  RR: 18 (24 Jan 2021 05:11) (18 - 18)  SpO2: 97% (24 Jan 2021 05:11) (92% - 97%)  I&O's Summary      PHYSICAL EXAM:  General: No acute distress BMI-24  HEENT: EOMI, PERRL  Neck: Supple, [ ] JVD  Lungs: Equal air entry bilaterally; [ ] rales [ ] wheezing [ ] rhonchi  Heart: Regular rate and rhythm; [x ] murmur  2 /6 [x ] systolic [ ] diastolic [ ] radiation[ ] rubs [ ]  gallops  Abdomen: Nontender, bowel sounds present  Extremities: No clubbing, cyanosis, [x ] edema [ ]Pulses  equal and intact  Nervous system:  Alert & Oriented X3, no focal deficits  Psychiatric: Normal affect  Skin: No rashes or lesions    LABS:  01-24    141  |  106  |  14  ----------------------------<  92  4.1   |  28  |  0.74    Ca    9.1      24 Jan 2021 09:03  Phos  3.2     01-24  Mg     2.0     01-24    TPro  6.2  /  Alb  2.5<L>  /  TBili  0.4  /  DBili  x   /  AST  13  /  ALT  16  /  AlkPhos  103  01-23    Creatinine Trend: 0.74<--, 0.72<--, 0.89<--                        12.5   10.06 )-----------( 200      ( 24 Jan 2021 09:03 )             41.0     PT/INR - ( 23 Jan 2021 06:05 )   PT: 14.9 sec;   INR: 1.27 ratio         PTT - ( 23 Jan 2021 06:05 )  PTT:33.9 sec    Cardiac Enzymes: CARDIAC MARKERS ( 23 Jan 2021 06:05 )  0.095 ng/mL / x     / x     / x     / x      CARDIAC MARKERS ( 22 Jan 2021 21:11 )  0.118 ng/mL / x     / x     / x     / x      CARDIAC MARKERS ( 22 Jan 2021 15:59 )  0.131 ng/mL / x     / x     / x     / x          Serum Pro-Brain Natriuretic Peptide: 28249 pg/mL (01-22-21 @ 17:02)      RADIOLOGY: CT ANGIO CHEST (W)AW IC      IMPRESSION:  Extensive bilateral pulmonary emboli involving all lobes.  Evidence of right heart strain.    LE VENOUS DOPPLERS:  IMPRESSION:  Left leg deep vein thrombosis.  Acute deep venous thrombosis: above and below the knee.            TELEMETRY:Sinus rhythm    ECHO:01/23/2021  CONCLUSIONS:  1. Normal mitral valve. Trace mitral regurgitation.  2. Normal trileaflet aortic valve. Mild aortic regurgitation.  3. Aortic Root: 3.4 cm.  4. Normal left atrium.  LA volume index = 24 cc/m2.  5. Normal left ventricular internal dimensions and wall thicknesses.  6. Mild global left ventricular systolic dysfunction.EF 45%  7. Normal right atrium.A moble 2.4cm  by 1 cm  echodense structuree seen most likely representing clot in transit.  8. Normal right ventricular size and function. Nova's sign is noted (RV hypokinesis with apical sparing), suspicious for pulmonary embolism.(TAPSE 2.1cm,tissue  doppler .10m/s)  9. RV systolic pressure is moderately increased at  46 mm Hg.  10. There is moderate tricuspid regurgitation.  11. There is mild pulmonic regurgitation.  12. Small pericardial effusion.  13.Dr. Miramontes notified of above findings.        IMPRESSION AND PLAN:      75F, from  [facility], wheel chair bound  with PMH of Dementia, right hip fracture in rehab, sent to the emergency department for hypoxia.  CTA- Extensive bilateral pulmonary emboli involving all lobes.  Evidence of right heart strain.  Covid- negative         Problem/Plan - 1:  ·  Problem: Pulmonary emboli.  Plan: Pt sent from facility for hypoxia and increased work of breathing noted today  CXR- Clear lungs at this time. Heart enlargement again noted .WBC- 17.5  Trops 0.13  EKG - sinus tachycardia   CTA- Extensive bilateral pulmonary emboli involving all lobes.  Evidence of right heart strain.  TTE RA thrombus  Covid- negative   Started on lovenox Full dose  Transition to Eliquis 10 mg BID for 5 days tomorrow      Problem/Plan - 2:  ·  Problem: Sepsis.  Plan: Pt  noted to be hypothermic, tachycardic with WBC 17K   no clear source of infection noted   CXR- no evidence of any infiltrates  S/P 1 L NS bolus in ED and Ceftraixone   Off ABx       Problem/Plan - 3:  ·  Problem: Dementia.  Plan: Pt has PMH of dementia  Not on any medication as per NH papers  f/u folate and B12.     Problem/Plan - 4:  ·  Problem: Hip fracture, right.  Plan: Pt has  chronic Hip fracture   xray pelvis- Old. Fractures around the low central pelvis. Incidental fibroid.  Pt was wheel chair bound.    Problem/Plan - 5:  ·  Problem: Prophylactic measure.  Plan: IMPROVE VTE Individual Risk Assessment    RISK                                                          Points  [] Previous VTE                                           3  [] Thrombophilia                                        2  [] Lower limb paralysis                              2   [] Current Cancer                                       2   [x] Immobilization > 24 hrs                        1  [x] ICU/CCU stay > 24 hours                       1  [x] Age > 60                                                   1    IMPROVE VTE Score:  FD lovenox for PE   PPI.   
PULMONARY  progress note    SAUL VELASQUEZ  [id number]    Patient is a 75y old  Female who presents with a chief complaint of HYpoxia (24 Jan 2021 12:19)  Feels better, Pt seen yesterday but somehow ,note was not saved.  Discussed with Dr Miramontes yesterday      MEDICATIONS  (STANDING):  enoxaparin Injectable 60 milliGRAM(s) SubCutaneous two times a day      Allergies    No Known Allergies            PAST MEDICAL & SURGICAL HISTORY:  Dementia             REVIEW OF SYSTEMS: as per staff  CONSTITUTIONAL: No fever, weight loss, or fatigue   EYES: No eye pain, visual disturbances, or discharge  ENT:  No difficulty hearing, tinnitus, vertigo; No sinus or throat pain  NECK: No pain or stiffness or nodes  RESPIRATORY:  cough--   wheezing --  chills--   hemoptysis--  Shortness of Breath--  CARDIOVASCULAR: No chest pain, palpitations, passing out, dizziness, or leg swelling  GASTROINTESTINAL: No abdominal or epigastric pain. No nausea, vomiting,   GENITOURINARY: No dysuria, frequency, hematuria, or incontinence  NEUROLOGICAL: No headaches, memory loss+, no loss of strength, numbness, or tremors  SKIN: No itching, burning, rashes, or lesions   LYMPH Nodes: No enlarged glands  ENDOCRINE: No heat or cold intolerance; No hair loss  HEME/LYMPH: No easy bruising, or bleeding gums  ALLERGY AND IMMUNOLOGIC: No hives or eczema    Vital Signs Last 24 Hrs  T(C): 36.5 (25 Jan 2021 05:27), Max: 37.4 (24 Jan 2021 18:30)  T(F): 97.7 (25 Jan 2021 05:27), Max: 99.4 (24 Jan 2021 18:30)  HR: 75 (25 Jan 2021 05:27) (75 - 95)  BP: 144/94 (25 Jan 2021 05:27) (102/96 - 151/98)  BP(mean): --  RR: 18 (25 Jan 2021 05:27) (17 - 18)  SpO2: 98% (25 Jan 2021 05:27) (94% - 98%)  I&O's Detail      PHYSICAL EXAMINATION:    GENERAL: The patient is a thin female in no apparent distress.   SKIN no rash ecchymoses or bruises  HEENT: Head is normocephalic and atraumatic  SABINO , Mucous membranes  moist.   Neck supple ,No LN felt JVP not increased  Thyroid not enlarged  Cardiovascular:  S1 S2 heard, RSR, No JVD , systolic  murmur at apex, No gallop or rub  Respiratory: Chest wall symmetrical with good air entry ,Percussion note normal,    Lungs vesicular breathing with no  rales  or  wheeze	  ABDOMEN:  Soft, Non-tender,  no hepatomegaly or splenomegaly BS positive	  Extremities: Normal range of motion, No clubbing, cyanosis or edema  Vascular: Peripheral pulses palpable 2+ bilaterally  CNS:  Alert and responsive   Cranial nerves intact  sensory intact  motor power5/5  dtr 2+   Babinski neg    LABS:                        11.9   8.96  )-----------( 214      ( 25 Jan 2021 07:40 )             38.3     01-25    142  |  107  |  16  ----------------------------<  96  3.9   |  28  |  0.71    Ca    9.1      25 Jan 2021 07:40  Phos  3.3     01-25  Mg     2.0     01-24                  Serum Pro-Brain Natriuretic Peptide: 69073 pg/mL (01-22-21 @ 17:02)      Ferritin, Serum: 248 ng/mL (01-23-21 @ 11:49)  Folate, Serum: 9.0 ng/mL (01-23-21 @ 11:49)  Vitamin B12, Serum: 535 pg/mL (01-23-21 @ 11:49)      MICROBIOLOGY:    Culture - Urine (collected 01-22-21 @ 22:00)  Source: .Urine Clean Catch (Midstream)  Final Report (01-23-21 @ 17:11):    No growth    Culture - Blood (collected 01-22-21 @ 21:59)  Source: .Blood Blood-Peripheral  Preliminary Report (01-23-21 @ 22:02):    No growth to date.    Culture - Blood (collected 01-22-21 @ 21:59)  Source: .Blood Blood-Peripheral  Preliminary Report (01-23-21 @ 22:02):    No growth to date.      Venous Doppler legs - < from: US Duplex Venous Lower Ext Complete, Bilateral (01.23.21 @ 11:12) >  Left leg deep vein thrombosis.  Acute deep venous thrombosis: above and below the knee.    
NP Note discussed with  Primary Attending    Patient is a 75y old  Female who presents with a chief complaint of HYpoxia (25 Jan 2021 10:14)    HPI- 75F, from  [facility], wheel chair bound  with PMH of Dementia, right hip fracture in rehab, sent to the emergency department for hypoxia. Patient is a poor historian and all the pertinent medical information was obtained from the Facility papers and ED provider. Daughter in law also added to some collateral information. Pt was noted to be short of breath with increased work of breathing at the facility today. She has a covid testing earlier this week that was negative. Pt in ED was noted to have SPO2- 83% on RA and was then started on 4L NC.  Denies any active smoking, alcohol or any substance abuse.     In the ED,   Pt is AA0X , confused,   Vitals- 96.1, HR 98, /01, SPO2- 98% on 4L  CXR- Clear lungs at this time. Heart enlargement again noted .WBC- 17.5  Trops 0.13  EKG - sinus tachycardia   CTA- Extensive bilateral pulmonary emboli involving all lobes.  Evidence of right heart strain.  Covid- negative     Patient is admitted for PE on CT angiogram. DVT positive. Was on full dose lovenox, now to switch to Eliquis per dr. Miramontes. PT recs NIURKA. Pt family does not want [facility] this time. CM following.       INTERVAL HPI/OVERNIGHT EVENTS: Seen at bedside. denies any pain or discomfort at this time.     MEDICATIONS  (STANDING):  apixaban 10 milliGRAM(s) Oral every 12 hours    MEDICATIONS  (PRN):      __________________________________________________  REVIEW OF SYSTEMS:    CONSTITUTIONAL: No fever,   EYES: no acute visual disturbances  NECK: No pain or stiffness  RESPIRATORY: No cough; No shortness of breath  CARDIOVASCULAR: No chest pain, no palpitations  GASTROINTESTINAL: No pain. No nausea or vomiting; No diarrhea   NEUROLOGICAL: No headache or numbness, no tremors  MUSCULOSKELETAL: No joint pain, no muscle pain  GENITOURINARY: no dysuria, no frequency, no hesitancy  PSYCHIATRY: no depression , no anxiety  ALL OTHER  ROS negative        Vital Signs Last 24 Hrs  T(C): 37.2 (25 Jan 2021 14:45), Max: 37.4 (24 Jan 2021 18:30)  T(F): 99 (25 Jan 2021 14:45), Max: 99.4 (24 Jan 2021 18:30)  HR: 81 (25 Jan 2021 14:45) (75 - 88)  BP: 129/84 (25 Jan 2021 14:45) (124/87 - 144/94)  BP(mean): --  RR: 18 (25 Jan 2021 14:45) (18 - 18)  SpO2: 98% (25 Jan 2021 14:45) (96% - 98%)    ________________________________________________  PHYSICAL EXAM:  GENERAL: NAD, conversant.   HEENT: Normocephalic;  conjunctivae and sclerae clear; moist mucous membranes;   NECK : supple  CHEST/LUNG: Clear to auscultation bilaterally with good air entry   HEART: S1 S2  regular; no murmurs, gallops or rubs  ABDOMEN: Soft, Nontender, Nondistended; Bowel sounds present  EXTREMITIES: no cyanosis; no edema; no calf tenderness  SKIN: warm and dry; no rash  NERVOUS SYSTEM:  Awake and alert; Oriented  to place, person and time ; no new deficits    _________________________________________________  LABS:                        11.9   8.96  )-----------( 214      ( 25 Jan 2021 07:40 )             38.3     01-25    142  |  107  |  16  ----------------------------<  96  3.9   |  28  |  0.71    Ca    9.1      25 Jan 2021 07:40  Phos  3.3     01-25  Mg     2.0     01-24          CAPILLARY BLOOD GLUCOSE            RADIOLOGY & ADDITIONAL TESTS:    Imaging  Reviewed:  YES  `  < from: US Duplex Venous Lower Ext Complete, Bilateral (01.23.21 @ 11:12) >    EXAM:  US DPLX LWR EXT VEINS COMPL BI                            PROCEDURE DATE:  01/23/2021          INTERPRETATION:  CLINICAL INFORMATION: Pulmonary emboli. Covid positive.    COMPARISON: CT of the chest dated 01/22/2021.    TECHNIQUE: Duplex sonography of the BILATERAL LOWER extremity veins with color and spectral Doppler, with and without compression.    FINDINGS:    There is deep vein thrombosis involving the left mid and distal femoral vein, popliteal vein and posterior tibial vein.    There is normal compressibility of the bilateral common femoral, right femoral and right popliteal veins.  Doppler examination shows normal spontaneous and phasic flow.    Left posterior tibial vein thrombosis is identified. The rest of the visualized calf veins are unremarkable.    IMPRESSION:  Left leg deep vein thrombosis.  Acute deep venous thrombosis: above and below the knee.    Findings discussed with LILLIAM Olivas on  1/23/2021 12:35 PM with read back.            RENA DICK M.D., ATTENDING RADIOLOGIST  This document has been electronically signed. Jan 23 2021 12:39PM    < end of copied text >    < from: Transthoracic Echocardiogram (01.23.21 @ 07:32) >    Patient name: SAUL VELASQUEZ  YOB: 1945   Age: 75 (F)   MR#: [id number]  Study Date: 1/23/2021  [facility] RD[facility]  Study quality: Technically good  Referring Physician: Mars Miramontes MD  Blood Pressure: 119/82 mmHg  Height: 165 cm  Weight: 50 kg  BSA: 1.5 m2  ------------------------------------------------------------------------    PROCEDURE: Transthoracic echocardiogram with 2-D, M-Mode  and complete spectral and color flow Doppler.  INDICATION: Primary pulmonary hypertension (I27.0)  HISTORY:  ------------------------------------------------------------------------  DIMENSIONS:  Dimensions:     Normal Values:  LA:     3.9 cm    2.0 - 4.0 cm  Ao:     3.4 cm    2.0 - 3.8 cm  SEPTUM: 1.0 cm    0.6- 1.2 cm  PWT:    0.9 cm    0.6 - 1.1 cm  LVIDd:  4.6 cm    3.0 - 5.6 cm  LVIDs:  3.2 cm    1.8 - 4.0 cm      Derived Variables:  LVMI: 97 g/m2  RWT: 0.39  Ejection Fraction Visual Estimate: 45-50 %  Ejection Fraction Meek: 45 %    ------------------------------------------------------------------------  OBSERVATIONS:  Mitral Valve: Normal mitral valve. Trace mitral  regurgitation.  Aortic Root: Aortic Root: 3.4 cm.    Aortic Valve: Normal trileaflet aortic valve. Mild aortic  regurgitation.  Left Atrium: Normal left atrium.  LA volume index = 24  cc/m2.  Left Ventricle: Mild global left ventricular systolic  dysfunction. Normal left ventricular internal dimensions  and wall thicknesses.  Right Heart: Normal right atrium.A moble 2.4cm  by 1 cm  echodense structuree seen most likely representing clot in  transit. Normal right ventricular size and function.  Nova's sign is noted (RV hypokinesis with apical  sparing), suspicious for pulmonary embolism.(TAPSE  2.1cm,tissue doppler .10m/s) There is moderate tricuspid  regurgitation. There is mild pulmonic regurgitation.  Pericardium/PleuraSmall pericardial effusion.  Hemodynamic: RV systolic pressure is moderately increased  at  46 mm Hg.  ------------------------------------------------------------------------  CONCLUSIONS:  1. Normal mitral valve. Trace mitral regurgitation.  2. Normal trileaflet aortic valve. Mild aortic  regurgitation.  3. Aortic Root: 3.4 cm.    4. Normal left atrium.  LA volume index = 24 cc/m2.  5. Normal left ventricular internal dimensions and wall  thicknesses.  6. Mild global left ventricular systolic dysfunction.  7. Normal right atrium.A moble 2.4cm  by 1 cm  echodense  structuree seen most likely representing clot in transit.  8. Normal right ventricular size and function. Nova's  sign is noted (RV hypokinesis with apical sparing),  suspicious for pulmonary embolism.(TAPSE 2.1cm,tissue  doppler .10m/s)  9. RV systolic pressure is moderately increased at  46 mm  Hg.  10. There is moderate tricuspid regurgitation.  11. There is mild pulmonic regurgitation.  12. Small pericardial effusion.  13.Dr. Miramontes notified of oabove findings.  ------------------------------------------------------------------------  Confirmed on  1/23/2021 - 10:28:37 by Lizzie López MD  ------------------------------------------------------------------------    < end of copied text >      < from: CT Angio Chest w/ IV Cont (01.22.21 @ 20:17) >    EXAM:  CT ANGIO CHEST (W)AW                             PROCEDURE DATE:  01/22/2021          INTERPRETATION:  CLINICAL INFORMATION: Evaluate for pulmonary embolism.    COMPARISON: None.    PROCEDURE:  CT Angiography of the Chest.  59 ml of Omnipaque 350 was injected intravenously. 41 ml were discarded.  Sagittal and coronal reformats were performed as well as 3D (MIP) reconstructions.    FINDINGS:    LUNGS AND AIRWAYS: Patent central airways.  Mild scattered linear type atelectasis. No parenchymal consolidation.  PLEURA: No pleural effusion.  MEDIASTINUM AND LETICIA: No lymphadenopathy.  VESSELS: Extensive bilateral distal main, lobar, segmental and subsegmental pulmonary emboli involving all lobes. Atherosclerotic changes of the aorta and coronary arteries.  HEART: Cardiomegaly with enlarged right ventricle and evidence of right heart strain. Trace pericardial effusion.  CHEST WALL AND LOWER NECK: Within normal limits.  VISUALIZED UPPER ABDOMEN: Within normal limits.  BONES: Kyphosis. Degenerative changes. Mild T11 and T12 compression deformities, age indeterminate. Old rib fractures.    IMPRESSION:  Extensive bilateral pulmonary emboli involving all lobes.    Evidence of right heart strain.    Findings were discussed with Dr. Cuba 1/22/2021 8:35 PM by Dr. Meza with read back confirmation.            RANDY MEZA MD; Attending Radiologist  This document has been electronically signed. Jan 22 2021  8:47PM    < end of copied text >    Consultant(s) Notes Reviewed:   YES      Plan of care was discussed with patient and /or primary care giver; all questions and concerns were addressed

## 2021-01-25 NOTE — ADVANCED PRACTICE NURSE CONSULT - RECOMMEDATIONS
-Clean all affected areas with warm water, mild soap, and pat dry  -Frequent toileting  -Apply TRIAD Moisture Barrier Cream to the Perianal, Gluteal Fold, and Bilateral Gluteal areas b.i.d. PRN  -Apply Antifungal Cream to the patients Back b.i.d. PRN  -Elevate/float the patients heels using heel protectors and reposition the patient Q 2hrs using wedges or pillows

## 2021-01-25 NOTE — PROGRESS NOTE ADULT - ASSESSMENT
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

## 2021-01-26 VITALS — WEIGHT: 124.78 LBS

## 2021-01-26 LAB
ALBUMIN SERPL ELPH-MCNC: 2.3 G/DL — LOW (ref 3.5–5)
ALP SERPL-CCNC: 91 U/L — SIGNIFICANT CHANGE UP (ref 40–120)
ALT FLD-CCNC: 22 U/L DA — SIGNIFICANT CHANGE UP (ref 10–60)
ANION GAP SERPL CALC-SCNC: 7 MMOL/L — SIGNIFICANT CHANGE UP (ref 5–17)
APTT BLD: 37.8 SEC — HIGH (ref 27.5–35.5)
AST SERPL-CCNC: 16 U/L — SIGNIFICANT CHANGE UP (ref 10–40)
BASOPHILS # BLD AUTO: 0.06 K/UL — SIGNIFICANT CHANGE UP (ref 0–0.2)
BASOPHILS NFR BLD AUTO: 0.8 % — SIGNIFICANT CHANGE UP (ref 0–2)
BILIRUB SERPL-MCNC: 0.3 MG/DL — SIGNIFICANT CHANGE UP (ref 0.2–1.2)
BUN SERPL-MCNC: 13 MG/DL — SIGNIFICANT CHANGE UP (ref 7–18)
CALCIUM SERPL-MCNC: 8.6 MG/DL — SIGNIFICANT CHANGE UP (ref 8.4–10.5)
CHLORIDE SERPL-SCNC: 108 MMOL/L — SIGNIFICANT CHANGE UP (ref 96–108)
CO2 SERPL-SCNC: 27 MMOL/L — SIGNIFICANT CHANGE UP (ref 22–31)
CREAT SERPL-MCNC: 0.65 MG/DL — SIGNIFICANT CHANGE UP (ref 0.5–1.3)
EOSINOPHIL # BLD AUTO: 0.25 K/UL — SIGNIFICANT CHANGE UP (ref 0–0.5)
EOSINOPHIL NFR BLD AUTO: 3.2 % — SIGNIFICANT CHANGE UP (ref 0–6)
GLUCOSE SERPL-MCNC: 87 MG/DL — SIGNIFICANT CHANGE UP (ref 70–99)
HCT VFR BLD CALC: 37.5 % — SIGNIFICANT CHANGE UP (ref 34.5–45)
HGB BLD-MCNC: 11.7 G/DL — SIGNIFICANT CHANGE UP (ref 11.5–15.5)
IMM GRANULOCYTES NFR BLD AUTO: 0.8 % — SIGNIFICANT CHANGE UP (ref 0–1.5)
INR BLD: 1.35 RATIO — HIGH (ref 0.88–1.16)
LYMPHOCYTES # BLD AUTO: 2.35 K/UL — SIGNIFICANT CHANGE UP (ref 1–3.3)
LYMPHOCYTES # BLD AUTO: 29.6 % — SIGNIFICANT CHANGE UP (ref 13–44)
MAGNESIUM SERPL-MCNC: 1.8 MG/DL — SIGNIFICANT CHANGE UP (ref 1.6–2.6)
MCHC RBC-ENTMCNC: 26.8 PG — LOW (ref 27–34)
MCHC RBC-ENTMCNC: 31.2 GM/DL — LOW (ref 32–36)
MCV RBC AUTO: 86 FL — SIGNIFICANT CHANGE UP (ref 80–100)
MONOCYTES # BLD AUTO: 0.49 K/UL — SIGNIFICANT CHANGE UP (ref 0–0.9)
MONOCYTES NFR BLD AUTO: 6.2 % — SIGNIFICANT CHANGE UP (ref 2–14)
NEUTROPHILS # BLD AUTO: 4.72 K/UL — SIGNIFICANT CHANGE UP (ref 1.8–7.4)
NEUTROPHILS NFR BLD AUTO: 59.4 % — SIGNIFICANT CHANGE UP (ref 43–77)
NRBC # BLD: 0 /100 WBCS — SIGNIFICANT CHANGE UP (ref 0–0)
PHOSPHATE SERPL-MCNC: 3.2 MG/DL — SIGNIFICANT CHANGE UP (ref 2.5–4.5)
PLATELET # BLD AUTO: 211 K/UL — SIGNIFICANT CHANGE UP (ref 150–400)
POTASSIUM SERPL-MCNC: 3.9 MMOL/L — SIGNIFICANT CHANGE UP (ref 3.5–5.3)
POTASSIUM SERPL-SCNC: 3.9 MMOL/L — SIGNIFICANT CHANGE UP (ref 3.5–5.3)
PROT SERPL-MCNC: 5.9 G/DL — LOW (ref 6–8.3)
PROTHROM AB SERPL-ACNC: 15.8 SEC — HIGH (ref 10.6–13.6)
RBC # BLD: 4.36 M/UL — SIGNIFICANT CHANGE UP (ref 3.8–5.2)
RBC # FLD: 14.4 % — SIGNIFICANT CHANGE UP (ref 10.3–14.5)
SARS-COV-2 RNA SPEC QL NAA+PROBE: SIGNIFICANT CHANGE UP
SODIUM SERPL-SCNC: 142 MMOL/L — SIGNIFICANT CHANGE UP (ref 135–145)
WBC # BLD: 7.93 K/UL — SIGNIFICANT CHANGE UP (ref 3.8–10.5)
WBC # FLD AUTO: 7.93 K/UL — SIGNIFICANT CHANGE UP (ref 3.8–10.5)

## 2021-01-26 PROCEDURE — 87040 BLOOD CULTURE FOR BACTERIA: CPT

## 2021-01-26 PROCEDURE — 85379 FIBRIN DEGRADATION QUANT: CPT

## 2021-01-26 PROCEDURE — 85025 COMPLETE CBC W/AUTO DIFF WBC: CPT

## 2021-01-26 PROCEDURE — 97162 PT EVAL MOD COMPLEX 30 MIN: CPT

## 2021-01-26 PROCEDURE — 87086 URINE CULTURE/COLONY COUNT: CPT

## 2021-01-26 PROCEDURE — 99285 EMERGENCY DEPT VISIT HI MDM: CPT

## 2021-01-26 PROCEDURE — 80061 LIPID PANEL: CPT

## 2021-01-26 PROCEDURE — 83605 ASSAY OF LACTIC ACID: CPT

## 2021-01-26 PROCEDURE — 85730 THROMBOPLASTIN TIME PARTIAL: CPT

## 2021-01-26 PROCEDURE — 71275 CT ANGIOGRAPHY CHEST: CPT

## 2021-01-26 PROCEDURE — 83550 IRON BINDING TEST: CPT

## 2021-01-26 PROCEDURE — 83615 LACTATE (LD) (LDH) ENZYME: CPT

## 2021-01-26 PROCEDURE — 71045 X-RAY EXAM CHEST 1 VIEW: CPT

## 2021-01-26 PROCEDURE — 80048 BASIC METABOLIC PNL TOTAL CA: CPT

## 2021-01-26 PROCEDURE — 85610 PROTHROMBIN TIME: CPT

## 2021-01-26 PROCEDURE — 83036 HEMOGLOBIN GLYCOSYLATED A1C: CPT

## 2021-01-26 PROCEDURE — 85027 COMPLETE CBC AUTOMATED: CPT

## 2021-01-26 PROCEDURE — 96374 THER/PROPH/DIAG INJ IV PUSH: CPT

## 2021-01-26 PROCEDURE — 82728 ASSAY OF FERRITIN: CPT

## 2021-01-26 PROCEDURE — 84443 ASSAY THYROID STIM HORMONE: CPT

## 2021-01-26 PROCEDURE — 93005 ELECTROCARDIOGRAM TRACING: CPT

## 2021-01-26 PROCEDURE — 86769 SARS-COV-2 COVID-19 ANTIBODY: CPT

## 2021-01-26 PROCEDURE — 84100 ASSAY OF PHOSPHORUS: CPT

## 2021-01-26 PROCEDURE — 80053 COMPREHEN METABOLIC PANEL: CPT

## 2021-01-26 PROCEDURE — U0003: CPT

## 2021-01-26 PROCEDURE — 84484 ASSAY OF TROPONIN QUANT: CPT

## 2021-01-26 PROCEDURE — 0225U NFCT DS DNA&RNA 21 SARSCOV2: CPT

## 2021-01-26 PROCEDURE — 82607 VITAMIN B-12: CPT

## 2021-01-26 PROCEDURE — 93970 EXTREMITY STUDY: CPT

## 2021-01-26 PROCEDURE — 93306 TTE W/DOPPLER COMPLETE: CPT

## 2021-01-26 PROCEDURE — 83880 ASSAY OF NATRIURETIC PEPTIDE: CPT

## 2021-01-26 PROCEDURE — 83540 ASSAY OF IRON: CPT

## 2021-01-26 PROCEDURE — 36415 COLL VENOUS BLD VENIPUNCTURE: CPT

## 2021-01-26 PROCEDURE — 82746 ASSAY OF FOLIC ACID SERUM: CPT

## 2021-01-26 PROCEDURE — 83735 ASSAY OF MAGNESIUM: CPT

## 2021-01-26 PROCEDURE — 81001 URINALYSIS AUTO W/SCOPE: CPT

## 2021-01-26 PROCEDURE — U0005: CPT

## 2021-01-26 RX ORDER — NYSTATIN CREAM 100000 [USP'U]/G
1 CREAM TOPICAL
Refills: 0 | Status: DISCONTINUED | OUTPATIENT
Start: 2021-01-26 | End: 2021-01-26

## 2021-01-26 RX ORDER — CHOLECALCIFEROL (VITAMIN D3) 125 MCG
1000 CAPSULE ORAL
Qty: 0 | Refills: 0 | DISCHARGE
Start: 2021-01-26

## 2021-01-26 RX ORDER — NYSTATIN CREAM 100000 [USP'U]/G
1 CREAM TOPICAL
Qty: 0 | Refills: 0 | DISCHARGE
Start: 2021-01-26

## 2021-01-26 RX ORDER — CHOLECALCIFEROL (VITAMIN D3) 125 MCG
1000 CAPSULE ORAL DAILY
Refills: 0 | Status: DISCONTINUED | OUTPATIENT
Start: 2021-01-26 | End: 2021-01-26

## 2021-01-26 RX ORDER — SODIUM CHLORIDE 9 MG/ML
1 INJECTION INTRAMUSCULAR; INTRAVENOUS; SUBCUTANEOUS DAILY
Refills: 0 | Status: DISCONTINUED | OUTPATIENT
Start: 2021-01-26 | End: 2021-01-26

## 2021-01-26 RX ORDER — APIXABAN 2.5 MG/1
2 TABLET, FILM COATED ORAL
Qty: 0 | Refills: 0 | DISCHARGE
Start: 2021-01-26

## 2021-01-26 RX ADMIN — Medication 1000 UNIT(S): at 10:42

## 2021-01-26 RX ADMIN — APIXABAN 10 MILLIGRAM(S): 2.5 TABLET, FILM COATED ORAL at 05:29

## 2021-01-26 NOTE — DISCHARGE NOTE NURSING/CASE MANAGEMENT/SOCIAL WORK - PATIENT PORTAL LINK FT
You can access the FollowMyHealth Patient Portal offered by [facility] by registering at the following website: [email or web address]. By joining [employer]’s FollowMyHealth portal, you will also be able to view your health information using other applications (apps) compatible with our system.

## 2021-01-26 NOTE — DIETITIAN INITIAL EVALUATION ADULT. - NSPROEDAREADYLEARN_GEN_A_NUR
I won't start a medication for metals without a visit.  We can discuss this at her scheduled follow up.    Has she had a wellness visit this year?  Ok to sign the form.    Thanks  
Office note, from 6/20/19, states:    PLAN  She will have her fillings changed every month while taking binders.    We will plan on treating her for her heavy metals after the final removal.      Writer will refill the armour.  Anna didn't let this office know she needed a refill.  Last refill was #90 with 1 refill 3/19/19.  Her last thyroid labs were 9/4/18.  Her next visit is 11/21/19.  Writer will refill x1 with a note to get labs for further refills.      Please advise on medication for metals, in message below.  Thank you  
Patient came in the office for an update on her [place] thyroid because [facility] pharmacy did not have it when she went to go  prescription. Patient also mentioned she had her metal fillings removed from her teeth and can now start on the medication dr Garcia had previously recommended for her to pull out lead from her body. Patient also dropped off a physical wellness form for Dr Garcia to complete. Anna would like to know if this wellness form can be emailed to her through [employer]. Patient would like a call back with input. Thank you.  
Response, from Dr. Garcia, has been sent to Anna via [employer].  Writer is unable to send the Wellness form via [employer].  Patient will be informed that the form has been faxed to her Wellness program, that is listed on the form.  She will also be mailed a copy of this.  
acuteness of illness

## 2021-01-26 NOTE — DIETITIAN INITIAL EVALUATION ADULT. - PERTINENT MEDS FT
MEDICATIONS  (STANDING):  apixaban 10 milliGRAM(s) Oral every 12 hours  cholecalciferol 1000 Unit(s) Oral daily

## 2021-01-26 NOTE — DIETITIAN INITIAL EVALUATION ADULT. - PERTINENT LABORATORY DATA
01-26 Na142 mmol/L Glu 87 mg/dL K+ 3.9 mmol/L Cr  0.65 mg/dL BUN 13 mg/dL   01-26 Phos 3.2 mg/dL   01-26 Alb 2.3 g/dL<L>       01-23 Chol 153 mg/dL LDL --    HDL 54 mg/dL Trig 76 mg/dL  01-23-21 @ 12:29 HgbA1C 5.1 [4.0 - 5.6]
(3) slightly limited

## 2021-01-26 NOTE — DIETITIAN INITIAL EVALUATION ADULT. - OTHER INFO
Pt from skilled nursing facility for rehab, high suspicious for COVID19, in airborne/contact isolation room, confused, poor historian with dementia; Limited intake history data available at present, recent admission with qs=143.5 lb 12/15/2020, usual nx=359 lb, current ou=652.9 lb 1/23/2021, ? changes, may partly due to scale/fluid variance; Pending discharge planning to skilled nursing facility for rehab when medically ready per team Pt from skilled nursing facility for rehab, high suspicious for COVID19, in airborne/contact isolation room, confused, poor historian with dementia; Limited intake history data available at present, recent admission with sn=276.5 lb 12/15/2020, usual js=995 lb, current oj=563.9 lb 1/23/2021, ? wt loss,  changes may also partly due to scale/fluid variance; Pending discharge planning to skilled nursing facility for rehab when medically ready per team

## 2021-01-26 NOTE — DIETITIAN INITIAL EVALUATION ADULT. - SIGNS/SYMPTOMS
likes varied intake depending on food served and how pt feels, limited intake history data likes varied intake depending on food served/how pt feels, limited intake history data, ? wt loss

## 2021-01-27 LAB
CULTURE RESULTS: SIGNIFICANT CHANGE UP
CULTURE RESULTS: SIGNIFICANT CHANGE UP
SPECIMEN SOURCE: SIGNIFICANT CHANGE UP
SPECIMEN SOURCE: SIGNIFICANT CHANGE UP

## 2021-01-28 RX ORDER — APIXABAN 2.5 MG/1
1 TABLET, FILM COATED ORAL
Qty: 0 | Refills: 0 | DISCHARGE
Start: 2021-01-28

## 2021-03-10 NOTE — PHYSICAL THERAPY INITIAL EVALUATION ADULT - MANUAL MUSCLE TESTING RESULTS, REHAB EVAL
pain on R LE/grossly assessed due to Manual Repair Warning Statement: We plan on removing the manually selected variable below in favor of our much easier automatic structured text blocks found in the previous tab. We decided to do this to help make the flow better and give you the full power of structured data. Manual selection is never going to be ideal in our platform and I would encourage you to avoid using manual selection from this point on, especially since I will be sunsetting this feature. It is important that you do one of two things with the customized text below. First, you can save all of the text in a word file so you can have it for future reference. Second, transfer the text to the appropriate area in the Library tab. Lastly, if there is a flap or graft type which we do not have you need to let us know right away so I can add it in before the variable is hidden. No need to panic, we plan to give you roughly 6 months to make the change.

## 2021-10-26 NOTE — ED PROVIDER NOTE - WET READ LAUNCH
[FreeTextEntry1] : appears well today \par rapid strep negative/tcx pending \par no fever \par symptoms more c/w allergic rhinitis today \par advised mom to RTO for COVID testing should fever present or symptoms worsen \par 
<---- Click to enter wet read

## 2022-02-26 NOTE — DISCHARGE NOTE NURSING/CASE MANAGEMENT/SOCIAL WORK - PATIENT PORTAL LINK FT
You can access the FollowMyHealth Patient Portal offered by [facility] by registering at the following website: [email or web address]. By joining [employer]’s FollowMyHealth portal, you will also be able to view your health information using other applications (apps) compatible with our system.
oral

## 2022-07-12 NOTE — ED ADULT NURSE NOTE - CHIEF COMPLAINT
"Chief Complaint   Patient presents with     Well Child       Initial /60 (BP Location: Right arm, Patient Position: Chair, Cuff Size: Child)   Pulse 82   Temp 98.3  F (36.8  C) (Tympanic)   Resp 20   Ht 1.353 m (4' 5.25\")   Wt 29.5 kg (65 lb)   SpO2 98%   BMI 16.12 kg/m   Estimated body mass index is 16.12 kg/m  as calculated from the following:    Height as of this encounter: 1.353 m (4' 5.25\").    Weight as of this encounter: 29.5 kg (65 lb).  Medication Reconciliation: complete  Marichuy Pettit LPN    " The patient is a 75y Female complaining of fall down stairs.

## 2022-10-02 ENCOUNTER — INPATIENT (INPATIENT)
Facility: HOSPITAL | Age: 77
LOS: 10 days | Discharge: NURSING, REHAB OR LONG-TERM CARE | DRG: 563 | End: 2022-10-13
Attending: INTERNAL MEDICINE | Admitting: INTERNAL MEDICINE
Payer: COMMERCIAL

## 2022-10-02 VITALS
RESPIRATION RATE: 18 BRPM | OXYGEN SATURATION: 99 % | HEART RATE: 70 BPM | SYSTOLIC BLOOD PRESSURE: 147 MMHG | HEIGHT: 65 IN | DIASTOLIC BLOOD PRESSURE: 97 MMHG | TEMPERATURE: 97 F

## 2022-10-02 DIAGNOSIS — S49.90XA UNSPECIFIED INJURY OF SHOULDER AND UPPER ARM, UNSPECIFIED ARM, INITIAL ENCOUNTER: ICD-10-CM

## 2022-10-02 DIAGNOSIS — Z29.9 ENCOUNTER FOR PROPHYLACTIC MEASURES, UNSPECIFIED: ICD-10-CM

## 2022-10-02 DIAGNOSIS — S42.309A UNSPECIFIED FRACTURE OF SHAFT OF HUMERUS, UNSPECIFIED ARM, INITIAL ENCOUNTER FOR CLOSED FRACTURE: ICD-10-CM

## 2022-10-02 DIAGNOSIS — F03.90 UNSPECIFIED DEMENTIA WITHOUT BEHAVIORAL DISTURBANCE: ICD-10-CM

## 2022-10-02 LAB
ALBUMIN SERPL ELPH-MCNC: 3.1 G/DL — LOW (ref 3.5–5)
ALP SERPL-CCNC: 76 U/L — SIGNIFICANT CHANGE UP (ref 40–120)
ALT FLD-CCNC: 16 U/L DA — SIGNIFICANT CHANGE UP (ref 10–60)
ANION GAP SERPL CALC-SCNC: 4 MMOL/L — LOW (ref 5–17)
APTT BLD: 24 SEC — LOW (ref 27.5–35.5)
AST SERPL-CCNC: 15 U/L — SIGNIFICANT CHANGE UP (ref 10–40)
BASOPHILS # BLD AUTO: 0.06 K/UL — SIGNIFICANT CHANGE UP (ref 0–0.2)
BASOPHILS NFR BLD AUTO: 0.7 % — SIGNIFICANT CHANGE UP (ref 0–2)
BILIRUB SERPL-MCNC: 0.5 MG/DL — SIGNIFICANT CHANGE UP (ref 0.2–1.2)
BUN SERPL-MCNC: 20 MG/DL — HIGH (ref 7–18)
CALCIUM SERPL-MCNC: 9.3 MG/DL — SIGNIFICANT CHANGE UP (ref 8.4–10.5)
CHLORIDE SERPL-SCNC: 108 MMOL/L — SIGNIFICANT CHANGE UP (ref 96–108)
CO2 SERPL-SCNC: 28 MMOL/L — SIGNIFICANT CHANGE UP (ref 22–31)
CREAT SERPL-MCNC: 0.81 MG/DL — SIGNIFICANT CHANGE UP (ref 0.5–1.3)
EGFR: 74 ML/MIN/1.73M2 — SIGNIFICANT CHANGE UP
EOSINOPHIL # BLD AUTO: 0.18 K/UL — SIGNIFICANT CHANGE UP (ref 0–0.5)
EOSINOPHIL NFR BLD AUTO: 2.2 % — SIGNIFICANT CHANGE UP (ref 0–6)
GLUCOSE SERPL-MCNC: 107 MG/DL — HIGH (ref 70–99)
HCT VFR BLD CALC: 43.7 % — SIGNIFICANT CHANGE UP (ref 34.5–45)
HGB BLD-MCNC: 13.8 G/DL — SIGNIFICANT CHANGE UP (ref 11.5–15.5)
IMM GRANULOCYTES NFR BLD AUTO: 0.5 % — SIGNIFICANT CHANGE UP (ref 0–0.9)
INR BLD: 1.07 RATIO — SIGNIFICANT CHANGE UP (ref 0.88–1.16)
LYMPHOCYTES # BLD AUTO: 1.51 K/UL — SIGNIFICANT CHANGE UP (ref 1–3.3)
LYMPHOCYTES # BLD AUTO: 18.4 % — SIGNIFICANT CHANGE UP (ref 13–44)
MCHC RBC-ENTMCNC: 28.4 PG — SIGNIFICANT CHANGE UP (ref 27–34)
MCHC RBC-ENTMCNC: 31.6 GM/DL — LOW (ref 32–36)
MCV RBC AUTO: 89.9 FL — SIGNIFICANT CHANGE UP (ref 80–100)
MONOCYTES # BLD AUTO: 0.36 K/UL — SIGNIFICANT CHANGE UP (ref 0–0.9)
MONOCYTES NFR BLD AUTO: 4.4 % — SIGNIFICANT CHANGE UP (ref 2–14)
NEUTROPHILS # BLD AUTO: 6.06 K/UL — SIGNIFICANT CHANGE UP (ref 1.8–7.4)
NEUTROPHILS NFR BLD AUTO: 73.8 % — SIGNIFICANT CHANGE UP (ref 43–77)
NRBC # BLD: 0 /100 WBCS — SIGNIFICANT CHANGE UP (ref 0–0)
PLATELET # BLD AUTO: 169 K/UL — SIGNIFICANT CHANGE UP (ref 150–400)
POTASSIUM SERPL-MCNC: 4.3 MMOL/L — SIGNIFICANT CHANGE UP (ref 3.5–5.3)
POTASSIUM SERPL-SCNC: 4.3 MMOL/L — SIGNIFICANT CHANGE UP (ref 3.5–5.3)
PROT SERPL-MCNC: 7 G/DL — SIGNIFICANT CHANGE UP (ref 6–8.3)
PROTHROM AB SERPL-ACNC: 12.7 SEC — SIGNIFICANT CHANGE UP (ref 10.5–13.4)
RBC # BLD: 4.86 M/UL — SIGNIFICANT CHANGE UP (ref 3.8–5.2)
RBC # FLD: 14.2 % — SIGNIFICANT CHANGE UP (ref 10.3–14.5)
SARS-COV-2 RNA SPEC QL NAA+PROBE: SIGNIFICANT CHANGE UP
SODIUM SERPL-SCNC: 140 MMOL/L — SIGNIFICANT CHANGE UP (ref 135–145)
WBC # BLD: 8.21 K/UL — SIGNIFICANT CHANGE UP (ref 3.8–10.5)
WBC # FLD AUTO: 8.21 K/UL — SIGNIFICANT CHANGE UP (ref 3.8–10.5)

## 2022-10-02 PROCEDURE — 72125 CT NECK SPINE W/O DYE: CPT | Mod: 26,MA

## 2022-10-02 PROCEDURE — 73060 X-RAY EXAM OF HUMERUS: CPT | Mod: 26,LT

## 2022-10-02 PROCEDURE — 72170 X-RAY EXAM OF PELVIS: CPT | Mod: 26

## 2022-10-02 PROCEDURE — 73030 X-RAY EXAM OF SHOULDER: CPT | Mod: 26,LT

## 2022-10-02 PROCEDURE — 99285 EMERGENCY DEPT VISIT HI MDM: CPT

## 2022-10-02 PROCEDURE — 70450 CT HEAD/BRAIN W/O DYE: CPT | Mod: 26,MA

## 2022-10-02 PROCEDURE — 71045 X-RAY EXAM CHEST 1 VIEW: CPT | Mod: 26

## 2022-10-02 RX ORDER — LANOLIN ALCOHOL/MO/W.PET/CERES
3 CREAM (GRAM) TOPICAL AT BEDTIME
Refills: 0 | Status: DISCONTINUED | OUTPATIENT
Start: 2022-10-02 | End: 2022-10-13

## 2022-10-02 RX ORDER — MEMANTINE HYDROCHLORIDE 10 MG/1
10 TABLET ORAL DAILY
Refills: 0 | Status: DISCONTINUED | OUTPATIENT
Start: 2022-10-02 | End: 2022-10-13

## 2022-10-02 RX ORDER — CHLORHEXIDINE GLUCONATE 213 G/1000ML
1 SOLUTION TOPICAL DAILY
Refills: 0 | Status: COMPLETED | OUTPATIENT
Start: 2022-10-02 | End: 2022-10-03

## 2022-10-02 RX ORDER — SENNA PLUS 8.6 MG/1
2 TABLET ORAL AT BEDTIME
Refills: 0 | Status: DISCONTINUED | OUTPATIENT
Start: 2022-10-02 | End: 2022-10-13

## 2022-10-02 RX ORDER — APIXABAN 2.5 MG/1
5 TABLET, FILM COATED ORAL
Refills: 0 | Status: DISCONTINUED | OUTPATIENT
Start: 2022-10-02 | End: 2022-10-02

## 2022-10-02 RX ORDER — OXYCODONE HYDROCHLORIDE 5 MG/1
5 TABLET ORAL EVERY 6 HOURS
Refills: 0 | Status: DISCONTINUED | OUTPATIENT
Start: 2022-10-02 | End: 2022-10-06

## 2022-10-02 RX ORDER — LOSARTAN POTASSIUM 100 MG/1
25 TABLET, FILM COATED ORAL DAILY
Refills: 0 | Status: DISCONTINUED | OUTPATIENT
Start: 2022-10-02 | End: 2022-10-13

## 2022-10-02 RX ORDER — ACETAMINOPHEN 500 MG
650 TABLET ORAL EVERY 6 HOURS
Refills: 0 | Status: DISCONTINUED | OUTPATIENT
Start: 2022-10-02 | End: 2022-10-13

## 2022-10-02 RX ORDER — ACETAMINOPHEN 500 MG
650 TABLET ORAL ONCE
Refills: 0 | Status: COMPLETED | OUTPATIENT
Start: 2022-10-02 | End: 2022-10-02

## 2022-10-02 RX ORDER — FERROUS SULFATE 325(65) MG
0 TABLET ORAL
Qty: 0 | Refills: 0 | DISCHARGE

## 2022-10-02 RX ORDER — ATORVASTATIN CALCIUM 80 MG/1
10 TABLET, FILM COATED ORAL AT BEDTIME
Refills: 0 | Status: DISCONTINUED | OUTPATIENT
Start: 2022-10-02 | End: 2022-10-13

## 2022-10-02 RX ORDER — ONDANSETRON 8 MG/1
4 TABLET, FILM COATED ORAL EVERY 8 HOURS
Refills: 0 | Status: DISCONTINUED | OUTPATIENT
Start: 2022-10-02 | End: 2022-10-13

## 2022-10-02 RX ADMIN — Medication 650 MILLIGRAM(S): at 18:35

## 2022-10-02 RX ADMIN — Medication 650 MILLIGRAM(S): at 09:12

## 2022-10-02 RX ADMIN — Medication 650 MILLIGRAM(S): at 13:24

## 2022-10-02 RX ADMIN — Medication 650 MILLIGRAM(S): at 13:54

## 2022-10-02 RX ADMIN — SENNA PLUS 2 TABLET(S): 8.6 TABLET ORAL at 22:04

## 2022-10-02 RX ADMIN — Medication 650 MILLIGRAM(S): at 19:05

## 2022-10-02 RX ADMIN — Medication 3 MILLIGRAM(S): at 22:05

## 2022-10-02 RX ADMIN — ATORVASTATIN CALCIUM 10 MILLIGRAM(S): 80 TABLET, FILM COATED ORAL at 22:04

## 2022-10-02 RX ADMIN — Medication 650 MILLIGRAM(S): at 08:42

## 2022-10-02 NOTE — ED PROVIDER NOTE - PROGRESS NOTE DETAILS
[facility]-DO: pt with shoulder fx, sling ordered. Per  and daughter, pt with dementia and  unable to care for pt at home, requesting placement to [facility]. Discussed with hospitalist, accepted for admission.

## 2022-10-02 NOTE — H&P ADULT - ATTENDING COMMENTS
77 year old female, from home, ambulating w/ assistance, with PMHx dementia presents s/p fall today. History limited from patient. She does not remember what happened before or after the fall.   She does not contribute w/ much of the history. As per , patient typically has an unsteady gait. Patient attempted to walk by herself today and she tripped and fell forward hitting the door w/ her left shoulder. Patient reports head strike, but no LOC. Her  mentioned that the patient has had previous falls resulting on hip and leg fracture. Unknown when or if she had any surgeries.   Daughter was called 3 times for additional information. However, no response from here. Patient is currently having left shoulder pain. Denies any aspirin or AC use. Denies any fevers, chest pain,   shortness of breath, abdominal pain, vomiting, diarrhea, headache, vision change, numbness, weakness, or rash. She does not have any other concerns today.    In the ED  VS: 128/84 mmHg, HR 64, RR 18, SpO2 99%, Temp 36.7 C  Left Shoulder X ray showed impacted fracture surgical neck left humerus  CTH negative   CXR negative   Pelvic X ray negative       assessment: left humeral fx, s/p fall, h/o dementia      plan     Currently on a sling   ortho cons  CTH negative   Started on Tylenol for mild pain   Started on Oxycodone for severe pain   dvt prophylaxis  cont home meds

## 2022-10-02 NOTE — ED PROVIDER NOTE - OBJECTIVE STATEMENT
77 year old female with PMH dementia presents s/p fall today. History limited from pt, history also obtained from .  states pt typically unsteady on feet, requires assistance with ambulation. Pt attempted to walk by herself today, states she tripped and fell forward hitting left shoulder on door. Pt reports head strike, no LOC. Pt report severe left shoulder pain. Denies any aspirin or AC use. Denies any fevers, chest pain, shortness of breath, abdominal pain, vomiting, diarrhea, headache, vision change, numbness, weakness, or rash. Denies any additional complaints.

## 2022-10-02 NOTE — H&P ADULT - PROBLEM SELECTOR PLAN 3
Continue home Eliquis 5 mg BID Hold Eliquis 5 mg BID.  Restart after ortho consult and recommendations. Possible surgery.

## 2022-10-02 NOTE — ED ADULT NURSE NOTE - NSIMPLEMENTINTERV_GEN_ALL_ED
Implemented All Fall Risk Interventions:  [place] to call system. Call bell, personal items and telephone within reach. Instruct patient to call for assistance. Room bathroom lighting operational. Non-slip footwear when patient is off stretcher. Physically safe environment: no spills, clutter or unnecessary equipment. Stretcher in lowest position, wheels locked, appropriate side rails in place. Provide visual cue, wrist band, yellow gown, etc. Monitor gait and stability. Monitor for mental status changes and reorient to person, place, and time. Review medications for side effects contributing to fall risk. Reinforce activity limits and safety measures with patient and family.

## 2022-10-02 NOTE — H&P ADULT - PROBLEM SELECTOR PLAN 1
In the ED  VS: 128/84 mmHg, HR 64, RR 18, SpO2 99%, Temp 36.7 C  Left Shoulder X ray showed impacted fracture surgical neck left humerus  Currently on a sling   CTH negative   Started on Tylenol for mild pain   Started on Oxycodone for severe pain In the ED  VS: 128/84 mmHg, HR 64, RR 18, SpO2 99%, Temp 36.7 C  Left Shoulder X ray showed impacted fracture surgical neck left humerus  Currently on a sling   CTH negative   Started on Tylenol for mild pain   Started on Oxycodone for severe pain  PLEASE CONSULT ORTHO EARLY IN THE MORNING FOR FURTHER EVALUATION In the ED  VS: 128/84 mmHg, HR 64, RR 18, SpO2 99%, Temp 36.7 C  Left Shoulder X ray showed impacted fracture surgical neck left humerus  Currently on a sling   CTH negative   Started on Tylenol for mild pain   Started on Oxycodone for severe pain  Please consult in the morning for further recs.

## 2022-10-02 NOTE — ED PROVIDER NOTE - PHYSICAL EXAMINATION
CONSTITUTIONAL: non-toxic, well appearing, + airway intact  SKIN: no rash, no petechiae. no ecchymosis, no lacerations, superficial abrasion over left nose  EYES: PERRL, EOMI,  ENT: no hemotympanum, tongue midline,  NECK: Supple; no cervical-thoracic-lumbar spine tenderness  CARD: RRR, equal radial pulses bilaterally 2+  RESP: CTAB, no respiratory distress, no crepitus over chest wall  ABD: Soft, non-tender, non-distended  PELVIS: stable  EXT: Normal ROM BLE and RUE. Tender over left proximal humerus with deformity, LUE ROM limited by pain, elbow and wrist nontender, extremities NVI.   NEURO: Alert, oriented x 2. CN2-12 intact, equal strength bilaterally  PSYCH: Cooperative, appropriate.

## 2022-10-02 NOTE — H&P ADULT - NSHPPHYSICALEXAM_GEN_ALL_CORE
GENERAL: NAD  HEAD:  Atraumatic, Normocephalic  EYES:  Conjunctiva and sclera clear, pupils are equal, round, and reactive to light and accommodation.  NECK: Supple, No JVD, trachea is midline, no evidence of thyroid enlargement, no lymphadenopathy or tenderness.  CHEST/LUNG: Clear to auscultation; No rales, rhonchi, wheezing, or rubs  HEART: Regular rate and rhythm; No murmurs, rubs, or gallops  ABDOMEN: Soft, Nontender, Nondistended; Bowel sounds present  NERVOUS SYSTEM:  Alert & Oriented X3; No focal sensory or motor deficits are noted; Recent and remote memory impaired; Appropriate mood and affect.  EXTREMITIES:  2+ Peripheral Pulses, No clubbing, No cyanosis, B/L lower ext edema +1 noted, left arm on sling   SKIN: Warm, dry, and well perfused; Good turgor; Bruise on left nose

## 2022-10-02 NOTE — ED PROVIDER NOTE - CLINICAL SUMMARY MEDICAL DECISION MAKING FREE TEXT BOX
Qi: 77 year old female with PMH dementia presents s/p fall today. History limited from pt, history also obtained from .  states pt typically unsteady on feet, requires assistance with ambulation. Pt attempted to walk by herself today, states she tripped and fell forward hitting left shoulder on door. Pt reports head strike, no LOC. Pt report severe left shoulder pain. Denies any aspirin or AC use. Denies any fevers, chest pain, shortness of breath, abdominal pain, vomiting, diarrhea, headache, vision change, numbness, weakness, or rash. Likely left shoulder fx vs. dislocation, extremities NVI, at baseline mental status per . Plan includes labs, imaging, supportive treatment with dispo pending workup.
(2) well flexed

## 2022-10-02 NOTE — ED ADULT NURSE NOTE - HOW MANY DRINKS CONTAINING ALCOHOL DO YOU HAVE ON A TYPICAL DAY WHEN YOU ARE DRINKING?
Reason for Consult:   Dear Dr. Fabiola Morales,    Thank you for requesting ultrasound evaluation and maternal fetal medicine consultation on Jorge Hunter. As you are aware she is a 19year old female with a López pregnancy at 12w3d.   A maternal-fet factors such as diabetes mellitus. Data suggest that obese women should be encouraged to undertake a weight reduction program (diet, exercise, behavior modification, and possibly bariatric surgery in some cases) prior to attempting to conceive. Both prepregnancy obesity and excessive maternal weight gain before or during pregnancy contribute to an increased probability of  delivery.   It has also been hypothesized that obesity may lead to dystocia due to increased soft tissue deposition in living in a household with depressed individuals) and genetics (see below) probably play a role in outcome.   An association between maternal anxiety and specific adverse  outcomes (eg,  birth, low birthweight, low Apgars) has not been prove discontinued as compared to pregnant women who continue taking antidepressant medications.  The ACOG recommends that therapy with SSRIs or SNRIs during pregnancy be individualized; treatment of depression during pregnancy should incorporate the clinical exp performed in evaluation of a high risk first trimester screen in young women, between 17-20% will have another condition diagnosed other than Down syndrome that would have been missed if cell free DNA screening alone had been performed in this group of pat testing. Thank you for allowing me to participate in the care of your patient. Please do not hesitate to call with any questions or concerns. Total patient time was 40 minutes in evaluation, consultation, and coordination of care.   Greater than 50% 1 or 2

## 2022-10-02 NOTE — ED ADULT TRIAGE NOTE - CHIEF COMPLAINT QUOTE
biba c/o pain  Left shoulder s/p fall at home going to the bathroom + abrasions to nose .  denies LOC

## 2022-10-02 NOTE — H&P ADULT - ASSESSMENT
A 77 year old female, from home, ambulating w/ assistance, with PMHx dementia presents s/p fall. Admitted to medicine for further management.

## 2022-10-02 NOTE — PATIENT PROFILE ADULT - FALL HARM RISK - HARM RISK INTERVENTIONS

## 2022-10-02 NOTE — ED ADULT NURSE NOTE - OBJECTIVE STATEMENT
pt is here for s/p fall.  BIBA, s/p fall at home, left shoulder pain with movement, lac to nose, denied LOC or headache, denied N/V/D, denied chest pain or sob,

## 2022-10-02 NOTE — H&P ADULT - HISTORY OF PRESENT ILLNESS
A 77 year old female, from home, ambulating w/ assistance, with PMHx dementia presents s/p fall today. History limited from patient. She does not remember what happened before or after the fall.   She does not contribute w/ much of the history. As per , patient typically has an unsteady gait. Patient attempted to walk by herself today and she tripped and fell forward hitting the door w/   her left shoulder. Patient reports head strike, but no LOC. Her  mentioned that the patient has had previous falls resulting on hip and leg fracture. Unknown when or if she had any surgeries.   Daughter was called 3 times for additional information. However, no response from here. Patient is currently having left shoulder pain. Denies any aspirin or AC use. Denies any fevers, chest pain,   shortness of breath, abdominal pain, vomiting, diarrhea, headache, vision change, numbness, weakness, or rash. She does not have any other concerns today.    In the ED  VS: 128/84 mmHg, HR 64, RR 18, SpO2 99%, Temp 36.7 C  Left Shoulder X ray showed impacted fracture surgical neck left humerus  CTH negative   CXR negative   Pelvic X ray negative  A 77 year old female, from home, ambulating w/ assistance, with PMHx dementia presents s/p fall today. History limited from patient. She does not remember what happened before or after the fall.   She does not contribute w/ much of the history. As per , patient typically has an unsteady gait. Patient attempted to walk by herself today and she tripped and fell forward hitting the door w/ her left shoulder. Patient reports head strike, but no LOC. Her  mentioned that the patient has had previous falls resulting on hip and leg fracture. Unknown when or if she had any surgeries.   Daughter was called 3 times for additional information. However, no response from here. Patient is currently having left shoulder pain. Denies any aspirin or AC use. Denies any fevers, chest pain,   shortness of breath, abdominal pain, vomiting, diarrhea, headache, vision change, numbness, weakness, or rash. She does not have any other concerns today.    In the ED  VS: 128/84 mmHg, HR 64, RR 18, SpO2 99%, Temp 36.7 C  Left Shoulder X ray showed impacted fracture surgical neck left humerus  CTH negative   CXR negative   Pelvic X ray negative

## 2022-10-03 LAB
A1C WITH ESTIMATED AVERAGE GLUCOSE RESULT: 4.8 % — SIGNIFICANT CHANGE UP (ref 4–5.6)
ANION GAP SERPL CALC-SCNC: 6 MMOL/L — SIGNIFICANT CHANGE UP (ref 5–17)
APTT BLD: 32 SEC — SIGNIFICANT CHANGE UP (ref 27.5–35.5)
BLD GP AB SCN SERPL QL: SIGNIFICANT CHANGE UP
BUN SERPL-MCNC: 17 MG/DL — SIGNIFICANT CHANGE UP (ref 7–18)
CALCIUM SERPL-MCNC: 9.6 MG/DL — SIGNIFICANT CHANGE UP (ref 8.4–10.5)
CHLORIDE SERPL-SCNC: 108 MMOL/L — SIGNIFICANT CHANGE UP (ref 96–108)
CHOLEST SERPL-MCNC: 170 MG/DL — SIGNIFICANT CHANGE UP
CO2 SERPL-SCNC: 28 MMOL/L — SIGNIFICANT CHANGE UP (ref 22–31)
CREAT SERPL-MCNC: 0.66 MG/DL — SIGNIFICANT CHANGE UP (ref 0.5–1.3)
EGFR: 89 ML/MIN/1.73M2 — SIGNIFICANT CHANGE UP
ESTIMATED AVERAGE GLUCOSE: 91 MG/DL — SIGNIFICANT CHANGE UP (ref 68–114)
GLUCOSE SERPL-MCNC: 104 MG/DL — HIGH (ref 70–99)
HCT VFR BLD CALC: 39.6 % — SIGNIFICANT CHANGE UP (ref 34.5–45)
HDLC SERPL-MCNC: 65 MG/DL — SIGNIFICANT CHANGE UP
HGB BLD-MCNC: 12.8 G/DL — SIGNIFICANT CHANGE UP (ref 11.5–15.5)
INR BLD: 1.11 RATIO — SIGNIFICANT CHANGE UP (ref 0.88–1.16)
LIPID PNL WITH DIRECT LDL SERPL: 92 MG/DL — SIGNIFICANT CHANGE UP
MCHC RBC-ENTMCNC: 28.8 PG — SIGNIFICANT CHANGE UP (ref 27–34)
MCHC RBC-ENTMCNC: 32.3 GM/DL — SIGNIFICANT CHANGE UP (ref 32–36)
MCV RBC AUTO: 89 FL — SIGNIFICANT CHANGE UP (ref 80–100)
NON HDL CHOLESTEROL: 105 MG/DL — SIGNIFICANT CHANGE UP
NRBC # BLD: 0 /100 WBCS — SIGNIFICANT CHANGE UP (ref 0–0)
PLATELET # BLD AUTO: 172 K/UL — SIGNIFICANT CHANGE UP (ref 150–400)
POTASSIUM SERPL-MCNC: 4.2 MMOL/L — SIGNIFICANT CHANGE UP (ref 3.5–5.3)
POTASSIUM SERPL-SCNC: 4.2 MMOL/L — SIGNIFICANT CHANGE UP (ref 3.5–5.3)
PROTHROM AB SERPL-ACNC: 13.2 SEC — SIGNIFICANT CHANGE UP (ref 10.5–13.4)
RBC # BLD: 4.45 M/UL — SIGNIFICANT CHANGE UP (ref 3.8–5.2)
RBC # FLD: 14.3 % — SIGNIFICANT CHANGE UP (ref 10.3–14.5)
SODIUM SERPL-SCNC: 142 MMOL/L — SIGNIFICANT CHANGE UP (ref 135–145)
TRIGL SERPL-MCNC: 66 MG/DL — SIGNIFICANT CHANGE UP
WBC # BLD: 9.79 K/UL — SIGNIFICANT CHANGE UP (ref 3.8–10.5)
WBC # FLD AUTO: 9.79 K/UL — SIGNIFICANT CHANGE UP (ref 3.8–10.5)

## 2022-10-03 RX ORDER — ENOXAPARIN SODIUM 100 MG/ML
40 INJECTION SUBCUTANEOUS EVERY 24 HOURS
Refills: 0 | Status: DISCONTINUED | OUTPATIENT
Start: 2022-10-03 | End: 2022-10-13

## 2022-10-03 RX ADMIN — LOSARTAN POTASSIUM 25 MILLIGRAM(S): 100 TABLET, FILM COATED ORAL at 06:20

## 2022-10-03 RX ADMIN — ATORVASTATIN CALCIUM 10 MILLIGRAM(S): 80 TABLET, FILM COATED ORAL at 21:48

## 2022-10-03 RX ADMIN — ENOXAPARIN SODIUM 40 MILLIGRAM(S): 100 INJECTION SUBCUTANEOUS at 17:24

## 2022-10-03 RX ADMIN — MEMANTINE HYDROCHLORIDE 10 MILLIGRAM(S): 10 TABLET ORAL at 11:04

## 2022-10-03 RX ADMIN — SENNA PLUS 2 TABLET(S): 8.6 TABLET ORAL at 21:48

## 2022-10-03 NOTE — CONSULT NOTE ADULT - SUBJECTIVE AND OBJECTIVE BOX
Pt Name: SAUL VELASQUEZ  MRN: [id number]    ORTHOPEDIC CONSULT:    Orthopedic diagnosis:    HPI:   Patient presents with left shoulder pain s/p fall yesterday. Patient has history of dementia and has limited history. States she was walking in her home and tripped and hit her left shoulder but cannot recall specific details. Most of history obtained from H&P.  A 77 year old female, from home, ambulating w/ assistance, with PMHx dementia presents s/p fall yesterday 10/2/22. History limited from patient. She does not remember what happened before or after the fall.   She does not contribute w/ much of the history. As per , patient typically has an unsteady gait. Patient attempted to walk by herself today and she tripped and fell forward hitting the door w/ her left shoulder. Patient reports head strike, but no LOC. Her  mentioned that the patient has had previous falls resulting on hip and leg fracture. Unknown when or if she had any surgeries.   Daughter was called 3 times for additional information. However, no response from here. Patient is currently having left shoulder pain. Denies any aspirin or AC use. Denies any fevers, chest pain,   shortness of breath, abdominal pain, vomiting, diarrhea, headache, vision change, numbness, weakness, or rash. She does not have any other concerns today.    PAST MEDICAL & SURGICAL HISTORY:  No pertinent past medical history      Dementia      No significant past surgical history          ALLERGIES: No Known Allergies      MEDICATIONS: acetaminophen     Tablet .. 650 milliGRAM(s) Oral every 6 hours PRN  aluminum hydroxide/magnesium hydroxide/simethicone Suspension 30 milliLiter(s) Oral every 4 hours PRN  atorvastatin 10 milliGRAM(s) Oral at bedtime  chlorhexidine 2% Cloths 1 Application(s) Topical daily  losartan 25 milliGRAM(s) Oral daily  melatonin 3 milliGRAM(s) Oral at bedtime PRN  memantine 10 milliGRAM(s) Oral daily  ondansetron Injectable 4 milliGRAM(s) IV Push every 8 hours PRN  oxyCODONE    IR 5 milliGRAM(s) Oral every 6 hours PRN  senna 2 Tablet(s) Oral at bedtime      PHYSICAL EXAM:    Vital Signs Last 24 Hrs  T(C): 36.8 (03 Oct 2022 05:30), Max: 36.8 (03 Oct 2022 05:30)  T(F): 98.2 (03 Oct 2022 05:30), Max: 98.2 (03 Oct 2022 05:30)  HR: 64 (03 Oct 2022 05:30) (59 - 70)  BP: 145/79 (03 Oct 2022 05:30) (128/84 - 157/78)  BP(mean): 99 (02 Oct 2022 16:00) (99 - 99)  RR: 18 (03 Oct 2022 05:30) (18 - 18)  SpO2: 96% (03 Oct 2022 05:30) (96% - 99%)    Parameters below as of 03 Oct 2022 05:30  Patient On (Oxygen Delivery Method): room air        Left Shoulder: Skin intact. Swelling noted at left shoulder. Minimal ecchymosis at left shoulder. Sling in place. Compartments at forearm soft and NT. Sensation intact. NVI. ROM of all fingers, and wrist B/L. Decreased ROM of left shoulder due to pain.       LABS:                        12.8   9.79  )-----------( 172      ( 03 Oct 2022 06:35 )             39.6     10-03    142  |  108  |  17  ----------------------------<  104<H>  4.2   |  28  |  0.66    Ca    9.6      03 Oct 2022 06:35    TPro  7.0  /  Alb  3.1<L>  /  TBili  0.5  /  DBili  x   /  AST  15  /  ALT  16  /  AlkPhos  76  10-02    PT/INR - ( 03 Oct 2022 06:35 )   PT: 13.2 sec;   INR: 1.11 ratio         PTT - ( 03 Oct 2022 06:35 )  PTT:32.0 sec    RADIOLOGY:   < from: Xray Shoulder 2 Views, Left (10.02.22 @ 09:19) >  ACC: [id number] EXAM:  XR HUMERUS MIN 2 VIEWS LT                        ACC: [id number] EXAM:  XR SHOULDER COMP MIN 2V LT                          PROCEDURE DATE:  10/02/2022          INTERPRETATION:  History: Pain status post fall.    FINDINGS: Frontal shoulder and frontal humerus.    Impacted fracture surgical neck left humerus. No glenohumeral   dislocation. AC joint is aligned. Visualized left ribs and left lung   field unremarkable.    IMPRESSION:    Impacted fracture surgical neck left humerus.    --- End of Report ---            IAN GONZALEZ MD; Attending Radiologist  This document has been electronically signed. Oct  2 2022  9:14AM    < end of copied text >        IMPRESSION: Pt is a  77y Female with Left Shoulder Proximal Humerus Fracture    PLAN:  -  Pain management  -  DVT prophylaxis  -  Daily PT- Non weight bearing of left arm. Keep Left arm in sling at all times  -  Treat fracture conservatively. No surgical intervention at this time.  -  Patient to follow up with Orthopedist within 2 weeks for repeat xray and fracture monitoring  -  Case to be discussed with Dr. Vega

## 2022-10-03 NOTE — DISCHARGE NOTE PROVIDER - NSDCMRMEDTOKEN_GEN_ALL_CORE_FT
ATORVASTATIN 10 MG TABLET:   ELIQUIS 5 MG TABLET:   LOSARTAN POTASSIUM 25 MG TAB:   MEMANTINE HCL 10 MG TABLET:   oxyCODONE 5 mg oral tablet: 1 tab(s) orally every 6 hours, As Needed -Severe Pain (7 - 10) - for severe pain MDD:4 tablets  RIVASTIGMINE 3 MG CAPSULE:   senna oral tablet: 2 tab(s) orally once a day (at bedtime)   aluminum hydroxide-magnesium hydroxide 200 mg-200 mg/5 mL oral suspension: 30 milliliter(s) orally every 4 hours, As needed, Dyspepsia  atorvastatin 10 mg oral tablet: 1 tab(s) orally once a day (at bedtime)  LOSARTAN POTASSIUM 25 MG TAB: 1 tab(s) orally once a day  melatonin 3 mg oral tablet: 1 tab(s) orally once a day (at bedtime), As needed, Insomnia  memantine 10 mg oral tablet: 1 tab(s) orally once a day  oxyCODONE 5 mg oral tablet: 1 tab(s) orally every 6 hours, As Needed -Severe Pain (7 - 10) - for severe pain MDD:4 tablets  RIVASTIGMINE 3 MG CAPSULE: 1 cap(s) orally once a day  senna oral tablet: 2 tab(s) orally once a day (at bedtime)

## 2022-10-03 NOTE — CONSULT NOTE ADULT - NS ATTEND AMEND GEN_ALL_CORE FT
pt evaluated, agree with above.  treat with closed fracture treatment / care.  immobilization, pain control.  follow up in office in 1-2 wks for repeat xray

## 2022-10-03 NOTE — CHART NOTE - NSCHARTNOTEFT_GEN_A_CORE
spoke with daughter Kelsey regarding pt condition, plan of care and discharge planning. daughter  requesting  QBEC for pt. all question were answered. not applicable

## 2022-10-03 NOTE — PROGRESS NOTE ADULT - SUBJECTIVE AND OBJECTIVE BOX
Patient is a 77y old  Female who presents with a chief complaint of Fall (02 Oct 2022 18:31)    pt seen in icu [  ], reg med floor [   ], bed [  ], chair at bedside [   ], a+o x3 [  ], lethargic [  ],  nad [  ]    [place] [  ], ngt [  ], peg [  ], et tube [  ], cent line [  ], picc line [  ]        Allergies    No Known Allergies        Vitals    T(F): 98.2 (10-03-22 @ 05:30), Max: 98.2 (10-03-22 @ 05:30)  HR: 64 (10-03-22 @ 05:30) (59 - 70)  BP: 145/79 (10-03-22 @ 05:30) (128/84 - 157/78)  RR: 18 (10-03-22 @ 05:30) (18 - 18)  SpO2: 96% (10-03-22 @ 05:30) (96% - 99%)  Wt(kg): --  CAPILLARY BLOOD GLUCOSE          Labs                          12.8   9.79  )-----------( 172      ( 03 Oct 2022 06:35 )             39.6       10-03    142  |  108  |  17  ----------------------------<  104<H>  4.2   |  28  |  0.66    Ca    9.6      03 Oct 2022 06:35    TPro  7.0  /  Alb  3.1<L>  /  TBili  0.5  /  DBili  x   /  AST  15  /  ALT  16  /  AlkPhos  76  10-02                Radiology Results      Meds    MEDICATIONS  (STANDING):  atorvastatin 10 milliGRAM(s) Oral at bedtime  chlorhexidine 2% Cloths 1 Application(s) Topical daily  losartan 25 milliGRAM(s) Oral daily  memantine 10 milliGRAM(s) Oral daily  senna 2 Tablet(s) Oral at bedtime      MEDICATIONS  (PRN):  acetaminophen     Tablet .. 650 milliGRAM(s) Oral every 6 hours PRN Temp greater or equal to 38C (100.4F), Mild Pain (1 - 3)  aluminum hydroxide/magnesium hydroxide/simethicone Suspension 30 milliLiter(s) Oral every 4 hours PRN Dyspepsia  melatonin 3 milliGRAM(s) Oral at bedtime PRN Insomnia  ondansetron Injectable 4 milliGRAM(s) IV Push every 8 hours PRN Nausea and/or Vomiting  oxyCODONE    IR 5 milliGRAM(s) Oral every 6 hours PRN Severe Pain (7 - 10)      Physical Exam    Neuro :  no focal deficits  Respiratory: CTA B/L  CV: RRR, S1S2, no murmurs,   Abdominal: Soft, NT, ND +BS,  Extremities: No edema, + peripheral pulses    ASSESSMENT    Unspecified injury of shoulder and upper arm, unspecified arm, initial encounter    No pertinent past medical history    Dementia    No significant past surgical history        PLAN     Patient is a 77y old  Female who presents with a chief complaint of Fall (02 Oct 2022 18:31)    pt seen in icu [  ], reg med floor [ x  ], bed [ x ], chair at bedside [   ], awake and responsive [ x ], lethargic [  ],  nad [ x ]    Allergies    No Known Allergies        Vitals    T(F): 98.2 (10-03-22 @ 05:30), Max: 98.2 (10-03-22 @ 05:30)  HR: 64 (10-03-22 @ 05:30) (59 - 70)  BP: 145/79 (10-03-22 @ 05:30) (128/84 - 157/78)  RR: 18 (10-03-22 @ 05:30) (18 - 18)  SpO2: 96% (10-03-22 @ 05:30) (96% - 99%)  Wt(kg): --  CAPILLARY BLOOD GLUCOSE          Labs                          12.8   9.79  )-----------( 172      ( 03 Oct 2022 06:35 )             39.6       10-03    142  |  108  |  17  ----------------------------<  104<H>  4.2   |  28  |  0.66    Ca    9.6      03 Oct 2022 06:35    TPro  7.0  /  Alb  3.1<L>  /  TBili  0.5  /  DBili  x   /  AST  15  /  ALT  16  /  AlkPhos  76  10-02                Radiology Results      Meds    MEDICATIONS  (STANDING):  atorvastatin 10 milliGRAM(s) Oral at bedtime  chlorhexidine 2% Cloths 1 Application(s) Topical daily  losartan 25 milliGRAM(s) Oral daily  memantine 10 milliGRAM(s) Oral daily  senna 2 Tablet(s) Oral at bedtime      MEDICATIONS  (PRN):  acetaminophen     Tablet .. 650 milliGRAM(s) Oral every 6 hours PRN Temp greater or equal to 38C (100.4F), Mild Pain (1 - 3)  aluminum hydroxide/magnesium hydroxide/simethicone Suspension 30 milliLiter(s) Oral every 4 hours PRN Dyspepsia  melatonin 3 milliGRAM(s) Oral at bedtime PRN Insomnia  ondansetron Injectable 4 milliGRAM(s) IV Push every 8 hours PRN Nausea and/or Vomiting  oxyCODONE    IR 5 milliGRAM(s) Oral every 6 hours PRN Severe Pain (7 - 10)      Physical Exam    Neuro :  no focal deficits  Respiratory: CTA B/L  CV: RRR, S1S2, no murmurs,   Abdominal: Soft, NT, ND +BS,  Extremities: LUE sling, No edema, + peripheral pulses    ASSESSMENT    left humeral fx,   s/p fall,   h/o dementia  HTN  HLD      PLAN    Currently on a sling   ortho cons  CTH negative   Started on Tylenol for mild pain   Started on Oxycodone for severe pain   dvt prophylaxis   phys tx eval   cont home meds

## 2022-10-03 NOTE — PROGRESS NOTE ADULT - ASSESSMENT
A 77 year old female, from home, ambulating w/ assistance, with PMHx dementia presents s/p fall. found to have Impacted fracture surgical neck left humerus. CT head negative. Orthro consulted. Recommended conservative management. Pending PT eval. A 77 year old female, from home, ambulating w/ assistance, with PMHx dementia presents s/p fall. found to have Impacted fracture surgical neck left humerus. CT head negative. Orthro consulted. Recommended conservative management. Pt julio reqs dispo to sub acute rehab A 77 year old female, from home, ambulating w/ assistance, with PMHx dementia presents s/p fall. found to have Impacted fracture surgical neck left humerus. CT head negative. Orthro consulted. Recommended conservative management. PT recs angela. Pending pacmemet to [facility].

## 2022-10-03 NOTE — PROGRESS NOTE ADULT - SUBJECTIVE AND OBJECTIVE BOX
Patient is a 77y old  Female who presents with a chief complaint of Fall (03 Oct 2022 10:51)      INTERVAL HPI/OVERNIGHT EVENTS:        REVIEW OF SYSTEMS:  CONSTITUTIONAL: No fever, chills  ENMT:  No difficulty hearing, no change in vision  NECK: No pain or stiffness  RESPIRATORY: No cough, SOB  CARDIOVASCULAR: No chest pain, palpitations  GASTROINTESTINAL: No abdominal pain. No nausea, vomiting, or diarrhea  GENITOURINARY: No dysuria  NEUROLOGICAL: No HA  SKIN: No itching, burning, rashes, or lesions   LYMPH NODES: No enlarged glands  ENDOCRINE: No heat or cold intolerance; No hair loss  MUSCULOSKELETAL: No joint pain or swelling; No muscle, back, or extremity pain  PSYCHIATRIC: No depression, anxiety  HEME/LYMPH: No easy bruising, or bleeding gums    T(C): 36.6 (10-03-22 @ 14:14), Max: 36.8 (10-03-22 @ 05:30)  HR: 62 (10-03-22 @ 14:14) (59 - 69)  BP: 116/63 (10-03-22 @ 14:14) (116/63 - 157/78)  RR: 17 (10-03-22 @ 14:14) (17 - 18)  SpO2: 100% (10-03-22 @ 14:14) (96% - 100%)  Wt(kg): --Vital Signs Last 24 Hrs  T(C): 36.6 (03 Oct 2022 14:14), Max: 36.8 (03 Oct 2022 05:30)  T(F): 97.8 (03 Oct 2022 14:14), Max: 98.2 (03 Oct 2022 05:30)  HR: 62 (03 Oct 2022 14:14) (59 - 69)  BP: 116/63 (03 Oct 2022 14:14) (116/63 - 157/78)  BP(mean): 99 (02 Oct 2022 16:00) (99 - 99)  RR: 17 (03 Oct 2022 14:14) (17 - 18)  SpO2: 100% (03 Oct 2022 14:14) (96% - 100%)    Parameters below as of 03 Oct 2022 14:14  Patient On (Oxygen Delivery Method): room air        PHYSICAL EXAM:  GENERAL: NAD  EYES: clear conjunctiva; EOMI  ENMT: Moist mucous membranes  NECK: Supple, No JVD, Normal thyroid  CHEST/LUNG: Clear to auscultation bilaterally; No rales, rhonchi, wheezing, or rubs  HEART: S1, S2, Regular rate and rhythm  ABDOMEN: Soft, Nontender, Nondistended; Bowel sounds present  NEURO: Alert & Oriented X3  EXTREMITIES: No LE edema, no calf tenderness  LYMPH: No lymphadenopathy noted  SKIN: No rashes or lesions    Consultant(s) Notes Reviewed:  [x ] YES  [ ] NO  Care Discussed with Consultants/Other Providers [ x] YES  [ ] NO    LABS:                        12.8   9.79  )-----------( 172      ( 03 Oct 2022 06:35 )             39.6     10-03    142  |  108  |  17  ----------------------------<  104<H>  4.2   |  28  |  0.66    Ca    9.6      03 Oct 2022 06:35    TPro  7.0  /  Alb  3.1<L>  /  TBili  0.5  /  DBili  x   /  AST  15  /  ALT  16  /  AlkPhos  76  10-02    PT/INR - ( 03 Oct 2022 06:35 )   PT: 13.2 sec;   INR: 1.11 ratio         PTT - ( 03 Oct 2022 06:35 )  PTT:32.0 sec  CAPILLARY BLOOD GLUCOSE                RADIOLOGY & ADDITIONAL TESTS:    Imaging Personally Reviewed:  [ ] YES  [ ] NO     Patient is a 77y old  Female who presents with a chief complaint of Fall (03 Oct 2022 10:51)      INTERVAL HPI/OVERNIGHT EVENTS: no acute envents onverngiht      REVIEW OF SYSTEMS: unable to assess due to mental status    MEDICATIONS  (STANDING):  atorvastatin 10 milliGRAM(s) Oral at bedtime  losartan 25 milliGRAM(s) Oral daily  memantine 10 milliGRAM(s) Oral daily  senna 2 Tablet(s) Oral at bedtime    MEDICATIONS  (PRN):  acetaminophen     Tablet .. 650 milliGRAM(s) Oral every 6 hours PRN Temp greater or equal to 38C (100.4F), Mild Pain (1 - 3)  aluminum hydroxide/magnesium hydroxide/simethicone Suspension 30 milliLiter(s) Oral every 4 hours PRN Dyspepsia  melatonin 3 milliGRAM(s) Oral at bedtime PRN Insomnia  ondansetron Injectable 4 milliGRAM(s) IV Push every 8 hours PRN Nausea and/or Vomiting  oxyCODONE    IR 5 milliGRAM(s) Oral every 6 hours PRN Severe Pain (7 - 10)      T(C): 36.6 (10-03-22 @ 14:14), Max: 36.8 (10-03-22 @ 05:30)  HR: 62 (10-03-22 @ 14:14) (59 - 69)  BP: 116/63 (10-03-22 @ 14:14) (116/63 - 157/78)  RR: 17 (10-03-22 @ 14:14) (17 - 18)  SpO2: 100% (10-03-22 @ 14:14) (96% - 100%)  Wt(kg): --Vital Signs Last 24 Hrs  T(C): 36.6 (03 Oct 2022 14:14), Max: 36.8 (03 Oct 2022 05:30)  T(F): 97.8 (03 Oct 2022 14:14), Max: 98.2 (03 Oct 2022 05:30)  HR: 62 (03 Oct 2022 14:14) (59 - 69)  BP: 116/63 (03 Oct 2022 14:14) (116/63 - 157/78)  BP(mean): 99 (02 Oct 2022 16:00) (99 - 99)  RR: 17 (03 Oct 2022 14:14) (17 - 18)  SpO2: 100% (03 Oct 2022 14:14) (96% - 100%)    Parameters below as of 03 Oct 2022 14:14  Patient On (Oxygen Delivery Method): room air      PHYSICAL EXAM:  GENERAL: NAD  EYES: clear conjunctiva; EOMI  ENMT: Moist mucous membranes  NECK: Supple, No JVD, Normal thyroid  CHEST/LUNG: Clear to auscultation bilaterally; No rales, rhonchi, wheezing, or rubs  HEART: S1, S2, Regular rate and rhythm  ABDOMEN: Soft, Nontender, Nondistended; Bowel sounds present  NEURO: Alert & awake  EXTREMITIES: No LE edema, no calf tenderness, LUE with sling mobilzed  LYMPH: No lymphadenopathy noted  SKIN: No rashes or lesions    Consultant(s) Notes Reviewed:  [x ] YES  [ ] NO  Care Discussed with Consultants/Other Providers [ x] YES  [ ] NO    LABS:                        12.8   9.79  )-----------( 172      ( 03 Oct 2022 06:35 )             39.6     10-03    142  |  108  |  17  ----------------------------<  104<H>  4.2   |  28  |  0.66    Ca    9.6      03 Oct 2022 06:35    TPro  7.0  /  Alb  3.1<L>  /  TBili  0.5  /  DBili  x   /  AST  15  /  ALT  16  /  AlkPhos  76  10-02    PT/INR - ( 03 Oct 2022 06:35 )   PT: 13.2 sec;   INR: 1.11 ratio         PTT - ( 03 Oct 2022 06:35 )  PTT:32.0 sec  CAPILLARY BLOOD GLUCOSE        RADIOLOGY & ADDITIONAL TESTS:    Imaging Personally Reviewed:  [ x] YES  [ ] NO    < from: CT Head No Cont (10.02.22 @ 09:30) >    ACC: [id number] EXAM:  CT CERVICAL SPINE                        ACC: [id number] EXAM:  CT BRAIN                          PROCEDURE DATE:  10/02/2022          INTERPRETATION:  CLINICAL HISTORY: Trauma. Status post fall with head   strike.    TECHNIQUE: A noncontrast head CT and a noncontrast cervical spine CT were   performed. The head CT was performed with contiguous 5 mm transaxial   images from the skull base to vertex. Images were reviewed in brain,   stroke, subdural and bone windows.  The cervical spine CT was performed with transaxial thin section images   from the occiput to the T4 level.  Coronal and sagittal reformatted   images were created from the transaxial source data.  Images were   reviewed in bone and soft tissue windows.    COMPARISON: None available.    FINDINGS:    Head CT:  There is no acute intracranial hemorrhage, vasogenic edema or evidence   for acute large vascular territory infarct. Patchy areas of   low-attenuation in bihemispheric white matter, nonspecific, but likely   the sequela of chronic microvascular change.    The ventricles, sulci and cisternal spaces are mildly diffusely prominent   but in proportion compatible with involutional change.  There is no   midline shift or abnormal extra-axial fluid collection.    The calvarium is intact.  There are no osteoblastic or lytic calvarial or   skull base lesions.  The paranasal sinuses and mastoid air cells are   clear.    Cervical spine CT:  The cervical alignment is intact. There are no acute fractures or   evidence of traumatic malalignment. The cervical vertebral body heights   are maintained. Chronic appearing compression fracture deformity of T4.   Multilevel facet alignment is preserved. The atlanto-dental interval is   within normal limits andthe craniocervical junction is unremarkable.   Generalized osteopenia. No suspicious osteoblastic or lytic lesions.    There is no evidence of prevertebral soft tissue swelling or epidural   hematoma.    Multilevel degenerative changes including intervertebral disc space   narrowing, disc osteophyte complexes and facet arthropathy contribute to   multilevel canal and foraminal stenosis, degree of which is suboptimally   assessed on this modality but appears most notable at C4/C5.    The visualized soft tissues of the neck have an unremarkable unenhanced   appearance. Mild biapical scarring.    IMPRESSION:  Head CT: No acute intracranial hemorrhage, vasogenic edema, extra-axial   collection or calvarial fracture. Chronic microvascular changes.    Cervical spine CT: No acute cervical spine fracture or evidence of   traumatic malalignment. Of cervical spondylosis most notable C4/C5.    --- End of Report ---      STEFAN YA MD; Attending Radiologist  This document has been electronically signed. Oct  2 2022 10:25AM    < end of copied text >    < from: Xray Shoulder 2 Views, Left (10.02.22 @ 09:19) >    ACC: [id number] EXAM:  XR HUMERUS MIN 2 VIEWS LT                        ACC: [id number] EXAM:  XR SHOULDER COMP MIN 2V LT                          PROCEDURE DATE:  10/02/2022          INTERPRETATION:  History: Pain status post fall.    FINDINGS: Frontal shoulder and frontal humerus.    Impacted fracture surgical neck left humerus. No glenohumeral   dislocation. AC joint is aligned. Visualized left ribs and left lung   field unremarkable.    IMPRESSION:    Impacted fracture surgical neck left humerus.    --- End of Report ---            IAN GONZALEZ MD; Attending Radiologist  This document has been electronically signed. Oct  2 2022  9:14AM    < end of copied text >

## 2022-10-03 NOTE — DISCHARGE NOTE PROVIDER - CARE PROVIDER_API CALL
Manuel Vega)  Orthopaedic Surgery; Sports Medicine  [address] [place] [address], [address] [place] [address]  Phone: [phone]  Fax: [phone]  Follow Up Time: 2 weeks

## 2022-10-03 NOTE — DISCHARGE NOTE PROVIDER - NSDCCPCAREPLAN_GEN_ALL_CORE_FT
PRINCIPAL DISCHARGE DIAGNOSIS  Diagnosis: Traumatic injury of shoulder with fracture of humerus  Assessment and Plan of Treatment: you came into the hospital after a fall.   you were found to have Impacted fracture surgical neck left humerus.   you were seen by orthopedics and recommended conservative management (pain control)  continue to take tylenol 650 mg every 6 hours as needed for mild pain  continue Daily physical therapy - Non weight bearing of left arm. Keep Left arm in sling at all times  YOU MUST follow up with Orthopedist within 2 weeks for repeat xray and fracture monitoring  imaging results:  Xray of chest that was normal  CT scan of your head - no bleeding.         SECONDARY DISCHARGE DIAGNOSES  Diagnosis: HTN (hypertension)  Assessment and Plan of Treatment: your blood pressure ranged between: (116/63 - 157/78)  continue taking your home medications losartan 25 mg daily  follow up with your primary care doctor or cardiologist.  try to take your blood pressure readings twice a day, morning and night time.   Notify your doctor if you have any of the following symptoms:   Dizziness, Lightheadedness, Blurry vision, Headache, Chest pain, Shortness of breath    Diagnosis: Dementia  Assessment and Plan of Treatment: continue taking your home medication memantine 10 mg daily    Diagnosis: HLD (hyperlipidemia)  Assessment and Plan of Treatment: continue taking your home medication atorvastatin 10 mg     PRINCIPAL DISCHARGE DIAGNOSIS  Diagnosis: Traumatic injury of shoulder with fracture of humerus  Assessment and Plan of Treatment: you came into the hospital after a fall.   you were found to have Impacted fracture surgical neck left humerus.   you were seen by orthopedics and recommended conservative management (pain control)  continue to take tylenol 650 mg every 6 hours as needed for mild pain  continue Daily physical therapy - Non weight bearing of left arm. Keep Left arm in sling at all times  YOU MUST follow up with Orthopedist within 2 weeks for repeat xray and fracture monitoring  imaging results:  Xray of chest that was normal  CT scan of your head - no bleeding.         SECONDARY DISCHARGE DIAGNOSES  Diagnosis: Dementia  Assessment and Plan of Treatment: continue taking your home medication memantine 10 mg daily    Diagnosis: HLD (hyperlipidemia)  Assessment and Plan of Treatment: continue taking your home medication atorvastatin 10 mg    Diagnosis: HTN (hypertension)  Assessment and Plan of Treatment: your blood pressure ranged between: (116/63 - 157/78)  continue taking your home medications losartan 25 mg daily  follow up with your primary care doctor or cardiologist.  try to take your blood pressure readings twice a day, morning and night time.   Notify your doctor if you have any of the following symptoms:   Dizziness, Lightheadedness, Blurry vision, Headache, Chest pain, Shortness of breath

## 2022-10-04 LAB
ANION GAP SERPL CALC-SCNC: 8 MMOL/L — SIGNIFICANT CHANGE UP (ref 5–17)
BUN SERPL-MCNC: 18 MG/DL — SIGNIFICANT CHANGE UP (ref 7–18)
CALCIUM SERPL-MCNC: 9.7 MG/DL — SIGNIFICANT CHANGE UP (ref 8.4–10.5)
CHLORIDE SERPL-SCNC: 106 MMOL/L — SIGNIFICANT CHANGE UP (ref 96–108)
CO2 SERPL-SCNC: 27 MMOL/L — SIGNIFICANT CHANGE UP (ref 22–31)
CREAT SERPL-MCNC: 0.7 MG/DL — SIGNIFICANT CHANGE UP (ref 0.5–1.3)
EGFR: 88 ML/MIN/1.73M2 — SIGNIFICANT CHANGE UP
GLUCOSE SERPL-MCNC: 95 MG/DL — SIGNIFICANT CHANGE UP (ref 70–99)
HCT VFR BLD CALC: 39.1 % — SIGNIFICANT CHANGE UP (ref 34.5–45)
HGB BLD-MCNC: 12.6 G/DL — SIGNIFICANT CHANGE UP (ref 11.5–15.5)
MCHC RBC-ENTMCNC: 28.6 PG — SIGNIFICANT CHANGE UP (ref 27–34)
MCHC RBC-ENTMCNC: 32.2 GM/DL — SIGNIFICANT CHANGE UP (ref 32–36)
MCV RBC AUTO: 88.9 FL — SIGNIFICANT CHANGE UP (ref 80–100)
NRBC # BLD: 0 /100 WBCS — SIGNIFICANT CHANGE UP (ref 0–0)
PLATELET # BLD AUTO: 155 K/UL — SIGNIFICANT CHANGE UP (ref 150–400)
POTASSIUM SERPL-MCNC: 3.7 MMOL/L — SIGNIFICANT CHANGE UP (ref 3.5–5.3)
POTASSIUM SERPL-SCNC: 3.7 MMOL/L — SIGNIFICANT CHANGE UP (ref 3.5–5.3)
RBC # BLD: 4.4 M/UL — SIGNIFICANT CHANGE UP (ref 3.8–5.2)
RBC # FLD: 14.5 % — SIGNIFICANT CHANGE UP (ref 10.3–14.5)
SODIUM SERPL-SCNC: 141 MMOL/L — SIGNIFICANT CHANGE UP (ref 135–145)
WBC # BLD: 8.95 K/UL — SIGNIFICANT CHANGE UP (ref 3.8–10.5)
WBC # FLD AUTO: 8.95 K/UL — SIGNIFICANT CHANGE UP (ref 3.8–10.5)

## 2022-10-04 RX ORDER — LOSARTAN POTASSIUM 100 MG/1
1 TABLET, FILM COATED ORAL
Qty: 0 | Refills: 0 | DISCHARGE

## 2022-10-04 RX ORDER — APIXABAN 2.5 MG/1
0 TABLET, FILM COATED ORAL
Qty: 0 | Refills: 0 | DISCHARGE

## 2022-10-04 RX ORDER — RIVASTIGMINE 4.6 MG/24H
1 PATCH, EXTENDED RELEASE TRANSDERMAL
Qty: 0 | Refills: 0 | DISCHARGE

## 2022-10-04 RX ORDER — RIVASTIGMINE 4.6 MG/24H
0 PATCH, EXTENDED RELEASE TRANSDERMAL
Qty: 0 | Refills: 0 | DISCHARGE

## 2022-10-04 RX ORDER — MEMANTINE HYDROCHLORIDE 10 MG/1
1 TABLET ORAL
Qty: 0 | Refills: 0 | DISCHARGE
Start: 2022-10-04

## 2022-10-04 RX ORDER — ATORVASTATIN CALCIUM 80 MG/1
0 TABLET, FILM COATED ORAL
Qty: 0 | Refills: 0 | DISCHARGE

## 2022-10-04 RX ORDER — LOSARTAN POTASSIUM 100 MG/1
0 TABLET, FILM COATED ORAL
Qty: 0 | Refills: 0 | DISCHARGE

## 2022-10-04 RX ORDER — MEMANTINE HYDROCHLORIDE 10 MG/1
0 TABLET ORAL
Qty: 0 | Refills: 0 | DISCHARGE

## 2022-10-04 RX ORDER — LANOLIN ALCOHOL/MO/W.PET/CERES
1 CREAM (GRAM) TOPICAL
Qty: 0 | Refills: 0 | DISCHARGE
Start: 2022-10-04

## 2022-10-04 RX ORDER — ATORVASTATIN CALCIUM 80 MG/1
1 TABLET, FILM COATED ORAL
Qty: 0 | Refills: 0 | DISCHARGE
Start: 2022-10-04

## 2022-10-04 RX ADMIN — SENNA PLUS 2 TABLET(S): 8.6 TABLET ORAL at 21:22

## 2022-10-04 RX ADMIN — ATORVASTATIN CALCIUM 10 MILLIGRAM(S): 80 TABLET, FILM COATED ORAL at 21:22

## 2022-10-04 RX ADMIN — LOSARTAN POTASSIUM 25 MILLIGRAM(S): 100 TABLET, FILM COATED ORAL at 05:16

## 2022-10-04 RX ADMIN — OXYCODONE HYDROCHLORIDE 5 MILLIGRAM(S): 5 TABLET ORAL at 14:29

## 2022-10-04 RX ADMIN — OXYCODONE HYDROCHLORIDE 5 MILLIGRAM(S): 5 TABLET ORAL at 12:14

## 2022-10-04 RX ADMIN — ENOXAPARIN SODIUM 40 MILLIGRAM(S): 100 INJECTION SUBCUTANEOUS at 16:15

## 2022-10-04 RX ADMIN — OXYCODONE HYDROCHLORIDE 5 MILLIGRAM(S): 5 TABLET ORAL at 21:55

## 2022-10-04 RX ADMIN — OXYCODONE HYDROCHLORIDE 5 MILLIGRAM(S): 5 TABLET ORAL at 21:22

## 2022-10-04 RX ADMIN — MEMANTINE HYDROCHLORIDE 10 MILLIGRAM(S): 10 TABLET ORAL at 11:29

## 2022-10-04 NOTE — PROGRESS NOTE ADULT - SUBJECTIVE AND OBJECTIVE BOX
Patient is a 77y old  Female who presents with a chief complaint of Fall (04 Oct 2022 06:52)    INTERVAL HPI/OVERNIGHT EVENTS: no acute events overnght    REVIEW OF SYSTEMS: unable to assess due to mental stauts  CONSTITUTIONAL: No fever, chills    T(C): 36.5 (10-04-22 @ 05:05), Max: 36.6 (10-03-22 @ 14:14)  HR: 59 (10-04-22 @ 05:05) (59 - 62)  BP: 141/78 (10-04-22 @ 05:05) (116/63 - 141/78)  RR: 18 (10-04-22 @ 05:05) (17 - 18)  SpO2: 97% (10-04-22 @ 05:05) (97% - 100%)  Wt(kg): --Vital Signs Last 24 Hrs  T(C): 36.5 (04 Oct 2022 05:05), Max: 36.6 (03 Oct 2022 14:14)  T(F): 97.7 (04 Oct 2022 05:05), Max: 97.9 (03 Oct 2022 20:30)  HR: 59 (04 Oct 2022 05:05) (59 - 62)  BP: 141/78 (04 Oct 2022 05:05) (116/63 - 141/78)  BP(mean): 79 (03 Oct 2022 20:30) (79 - 79)  RR: 18 (04 Oct 2022 05:05) (17 - 18)  SpO2: 97% (04 Oct 2022 05:05) (97% - 100%)    Parameters below as of 04 Oct 2022 05:05  Patient On (Oxygen Delivery Method): room air    MEDICATIONS  (STANDING):  atorvastatin 10 milliGRAM(s) Oral at bedtime  enoxaparin Injectable 40 milliGRAM(s) SubCutaneous every 24 hours  losartan 25 milliGRAM(s) Oral daily  memantine 10 milliGRAM(s) Oral daily  senna 2 Tablet(s) Oral at bedtime    MEDICATIONS  (PRN):  acetaminophen     Tablet .. 650 milliGRAM(s) Oral every 6 hours PRN Temp greater or equal to 38C (100.4F), Mild Pain (1 - 3)  aluminum hydroxide/magnesium hydroxide/simethicone Suspension 30 milliLiter(s) Oral every 4 hours PRN Dyspepsia  melatonin 3 milliGRAM(s) Oral at bedtime PRN Insomnia  ondansetron Injectable 4 milliGRAM(s) IV Push every 8 hours PRN Nausea and/or Vomiting  oxyCODONE    IR 5 milliGRAM(s) Oral every 6 hours PRN Severe Pain (7 - 10)    PHYSICAL EXAM:  GENERAL: NAD  EYES: clear conjunctiva; EOMI  ENMT: Moist mucous membranes  NECK: Supple, No JVD, Normal thyroid  CHEST/LUNG: Clear to auscultation bilaterally; No rales, rhonchi, wheezing, or rubs  HEART: S1, S2, Regular rate and rhythm  ABDOMEN: Soft, Nontender, Nondistended; Bowel sounds present  NEURO: Alert & awake  EXTREMITIES: No LE edema, no calf tenderness, L arm with sling   LYMPH: No lymphadenopathy noted  SKIN: No rashes or lesions    Consultant(s) Notes Reviewed:  [x ] YES  [ ] NO  Care Discussed with Consultants/Other Providers [ x] YES  [ ] NO    LABS:                        12.6   8.95  )-----------( 155      ( 04 Oct 2022 06:45 )             39.1     10-04    141  |  106  |  18  ----------------------------<  95  3.7   |  27  |  0.70    Ca    9.7      04 Oct 2022 06:45      PT/INR - ( 03 Oct 2022 06:35 )   PT: 13.2 sec;   INR: 1.11 ratio         PTT - ( 03 Oct 2022 06:35 )  PTT:32.0 sec  CAPILLARY BLOOD GLUCOSE    RADIOLOGY & ADDITIONAL TESTS:    Imaging Personally Reviewed:  [x ] YES  [ ] NO    ACC: [id number] EXAM:  CT CERVICAL SPINE                        ACC: [id number] EXAM:  CT BRAIN                          PROCEDURE DATE:  10/02/2022          INTERPRETATION:  CLINICAL HISTORY: Trauma. Status post fall with head   strike.    TECHNIQUE: A noncontrast head CT and a noncontrast cervical spine CT were   performed. The head CT was performed with contiguous 5 mm transaxial   images from the skull base to vertex. Images were reviewed in brain,   stroke, subdural and bone windows.  The cervical spine CT was performed with transaxial thin section images   from the occiput to the T4 level.  Coronal and sagittal reformatted   images were created from the transaxial source data.  Images were   reviewed in bone and soft tissue windows.    COMPARISON: None available.    FINDINGS:    Head CT:  There is no acute intracranial hemorrhage, vasogenic edema or evidence   for acute large vascular territory infarct. Patchy areas of   low-attenuation in bihemispheric white matter, nonspecific, but likely   the sequela of chronic microvascular change.    The ventricles, sulci and cisternal spaces are mildly diffusely prominent   but in proportion compatible with involutional change.  There is no   midline shift or abnormal extra-axial fluid collection.    The calvarium is intact.  There are no osteoblastic or lytic calvarial or   skull base lesions.  The paranasal sinuses and mastoid air cells are   clear.    Cervical spine CT:  The cervical alignment is intact. There are no acute fractures or   evidence of traumatic malalignment. The cervical vertebral body heights   are maintained. Chronic appearing compression fracture deformity of T4.   Multilevel facet alignment is preserved. The atlanto-dental interval is   within normal limits andthe craniocervical junction is unremarkable.   Generalized osteopenia. No suspicious osteoblastic or lytic lesions.    There is no evidence of prevertebral soft tissue swelling or epidural   hematoma.    Multilevel degenerative changes including intervertebral disc space   narrowing, disc osteophyte complexes and facet arthropathy contribute to   multilevel canal and foraminal stenosis, degree of which is suboptimally   assessed on this modality but appears most notable at C4/C5.    The visualized soft tissues of the neck have an unremarkable unenhanced   appearance. Mild biapical scarring.    IMPRESSION:  Head CT: No acute intracranial hemorrhage, vasogenic edema, extra-axial   collection or calvarial fracture. Chronic microvascular changes.    Cervical spine CT: No acute cervical spine fracture or evidence of   traumatic malalignment. Of cervical spondylosis most notable C4/C5.    --- End of Report ---            STEFAN YA MD; Attending Radiologist  This document has been electronically signed. Oct  2 2022 10:25AM

## 2022-10-04 NOTE — PROGRESS NOTE ADULT - SUBJECTIVE AND OBJECTIVE BOX
Patient is a 77y old  Female who presents with a chief complaint of Fall (03 Oct 2022 17:11)    pt seen in icu [  ], reg med floor [ x  ], bed [ x ], chair at bedside [   ], awake and responsive [ x ],   lethargic [  ],  nad [ x ]        Allergies    No Known Allergies        Vitals    T(F): 97.7 (10-04-22 @ 05:05), Max: 97.9 (10-03-22 @ 20:30)  HR: 59 (10-04-22 @ 05:05) (59 - 62)  BP: 141/78 (10-04-22 @ 05:05) (116/63 - 141/78)  RR: 18 (10-04-22 @ 05:05) (17 - 18)  SpO2: 97% (10-04-22 @ 05:05) (97% - 100%)  Wt(kg): --  CAPILLARY BLOOD GLUCOSE          Labs                          12.8   9.79  )-----------( 172      ( 03 Oct 2022 06:35 )             39.6       10-03    142  |  108  |  17  ----------------------------<  104<H>  4.2   |  28  |  0.66    Ca    9.6      03 Oct 2022 06:35    TPro  7.0  /  Alb  3.1<L>  /  TBili  0.5  /  DBili  x   /  AST  15  /  ALT  16  /  AlkPhos  76  10-02                Radiology Results      Meds    MEDICATIONS  (STANDING):  atorvastatin 10 milliGRAM(s) Oral at bedtime  enoxaparin Injectable 40 milliGRAM(s) SubCutaneous every 24 hours  losartan 25 milliGRAM(s) Oral daily  memantine 10 milliGRAM(s) Oral daily  senna 2 Tablet(s) Oral at bedtime      MEDICATIONS  (PRN):  acetaminophen     Tablet .. 650 milliGRAM(s) Oral every 6 hours PRN Temp greater or equal to 38C (100.4F), Mild Pain (1 - 3)  aluminum hydroxide/magnesium hydroxide/simethicone Suspension 30 milliLiter(s) Oral every 4 hours PRN Dyspepsia  melatonin 3 milliGRAM(s) Oral at bedtime PRN Insomnia  ondansetron Injectable 4 milliGRAM(s) IV Push every 8 hours PRN Nausea and/or Vomiting  oxyCODONE    IR 5 milliGRAM(s) Oral every 6 hours PRN Severe Pain (7 - 10)      Physical Exam    Neuro :  no focal deficits  Respiratory: CTA B/L  CV: RRR, S1S2, no murmurs,   Abdominal: Soft, NT, ND +BS,  Extremities: LUE sling, No edema, + peripheral pulses    ASSESSMENT    left humeral fx,   s/p fall,   h/o dementia  HTN  HLD      PLAN    Currently on a sling   ortho cons  CTH negative   Started on Tylenol for mild pain   Started on Oxycodone for severe pain   dvt prophylaxis   phys tx eval   cont home meds          Patient is a 77y old  Female who presents with a chief complaint of Fall (03 Oct 2022 17:11)    pt seen in icu [  ], reg med floor [ x  ], bed [ x ], chair at bedside [   ], awake and responsive [ x ],   lethargic [  ],  nad [ x ]        Allergies    No Known Allergies        Vitals    T(F): 97.7 (10-04-22 @ 05:05), Max: 97.9 (10-03-22 @ 20:30)  HR: 59 (10-04-22 @ 05:05) (59 - 62)  BP: 141/78 (10-04-22 @ 05:05) (116/63 - 141/78)  RR: 18 (10-04-22 @ 05:05) (17 - 18)  SpO2: 97% (10-04-22 @ 05:05) (97% - 100%)  Wt(kg): --  CAPILLARY BLOOD GLUCOSE          Labs                          12.8   9.79  )-----------( 172      ( 03 Oct 2022 06:35 )             39.6       10-03    142  |  108  |  17  ----------------------------<  104<H>  4.2   |  28  |  0.66    Ca    9.6      03 Oct 2022 06:35    TPro  7.0  /  Alb  3.1<L>  /  TBili  0.5  /  DBili  x   /  AST  15  /  ALT  16  /  AlkPhos  76  10-02                Radiology Results      Meds    MEDICATIONS  (STANDING):  atorvastatin 10 milliGRAM(s) Oral at bedtime  enoxaparin Injectable 40 milliGRAM(s) SubCutaneous every 24 hours  losartan 25 milliGRAM(s) Oral daily  memantine 10 milliGRAM(s) Oral daily  senna 2 Tablet(s) Oral at bedtime      MEDICATIONS  (PRN):  acetaminophen     Tablet .. 650 milliGRAM(s) Oral every 6 hours PRN Temp greater or equal to 38C (100.4F), Mild Pain (1 - 3)  aluminum hydroxide/magnesium hydroxide/simethicone Suspension 30 milliLiter(s) Oral every 4 hours PRN Dyspepsia  melatonin 3 milliGRAM(s) Oral at bedtime PRN Insomnia  ondansetron Injectable 4 milliGRAM(s) IV Push every 8 hours PRN Nausea and/or Vomiting  oxyCODONE    IR 5 milliGRAM(s) Oral every 6 hours PRN Severe Pain (7 - 10)      Physical Exam    Neuro :  no focal deficits  Respiratory: CTA B/L  CV: RRR, S1S2, no murmurs,   Abdominal: Soft, NT, ND +BS,  Extremities: LUE sling, No edema, + peripheral pulses    ASSESSMENT    left humeral fx,   s/p fall,   h/o dementia  HTN  HLD      PLAN    ortho f/u   -  Daily PT- Non weight bearing of left arm. Keep Left arm in sling at all times  -  Treat fracture conservatively. No surgical intervention at this time.  -  Patient to follow up with Orthopedist dr marroquin within 2 weeks for repeat xray and fracture monitoring  Pain management  CTH negative   cont Tylenol for mild pain   cont Oxycodone for severe pain   dvt prophylaxis   phys tx eval   cont home meds   d/c planning

## 2022-10-04 NOTE — PROGRESS NOTE ADULT - ASSESSMENT
A 77 year old female, from home, ambulating w/ assistance, with PMHx dementia presents s/p fall. found to have Impacted fracture surgical neck left humerus. CT head negative. Orthro consulted. Recommended conservative management. Pending PT eval.

## 2022-10-04 NOTE — PROGRESS NOTE ADULT - PROBLEM SELECTOR PLAN 3
on  Eliquis 5 mg BID , hold for surgery, may consider restarting , however unsure of indication, will f/u with daughter  start lovenox for now      Dispo: Pt is from home, but due to humerus fx daughter is requesting [facility] , since pt was at [facility] before.  cm following, madeline plan d/c to [facility]     - spoke with daughter Kelsey this am, discussed regarding  pt plan of care and discharge planning, all questions were answered

## 2022-10-05 LAB — SARS-COV-2 RNA SPEC QL NAA+PROBE: SIGNIFICANT CHANGE UP

## 2022-10-05 RX ADMIN — MEMANTINE HYDROCHLORIDE 10 MILLIGRAM(S): 10 TABLET ORAL at 13:00

## 2022-10-05 RX ADMIN — SENNA PLUS 2 TABLET(S): 8.6 TABLET ORAL at 21:50

## 2022-10-05 RX ADMIN — ATORVASTATIN CALCIUM 10 MILLIGRAM(S): 80 TABLET, FILM COATED ORAL at 21:50

## 2022-10-05 RX ADMIN — Medication 3 MILLIGRAM(S): at 21:49

## 2022-10-05 RX ADMIN — LOSARTAN POTASSIUM 25 MILLIGRAM(S): 100 TABLET, FILM COATED ORAL at 05:36

## 2022-10-05 RX ADMIN — ENOXAPARIN SODIUM 40 MILLIGRAM(S): 100 INJECTION SUBCUTANEOUS at 18:53

## 2022-10-05 NOTE — PROGRESS NOTE ADULT - SUBJECTIVE AND OBJECTIVE BOX
Patient is a 77y old  Female who presents with a chief complaint of Fall     INTERVAL HPI/OVERNIGHT EVENTS:    REVIEW OF SYSTEMS:  CONSTITUTIONAL: No fever, chills  ENMT:  No difficulty hearing, no change in vision  NECK: No pain or stiffness  RESPIRATORY: No cough, SOB  CARDIOVASCULAR: No chest pain, palpitations  GASTROINTESTINAL: No abdominal pain. No nausea, vomiting, or diarrhea  GENITOURINARY: No dysuria  NEUROLOGICAL: No HA  SKIN: No itching, burning, rashes, or lesions   LYMPH NODES: No enlarged glands  ENDOCRINE: No heat or cold intolerance; No hair loss  MUSCULOSKELETAL: No joint pain or swelling; No muscle, back, pain, Left shoulder pain with movement  PSYCHIATRIC: No depression, anxiety  HEME/LYMPH: No easy bruising, or bleeding gums    T(C): 37.4 (10-05-22 @ 05:28), Max: 37.4 (10-05-22 @ 05:28)  HR: 62 (10-05-22 @ 09:40) (62 - 65)  BP: 127/72 (10-05-22 @ 09:40) (123/71 - 143/80)  RR: 18 (10-05-22 @ 05:28) (17 - 18)  SpO2: 95% (10-05-22 @ 05:28) (95% - 99%)  Wt(kg): --Vital Signs Last 24 Hrs  T(C): 37.4 (05 Oct 2022 05:28), Max: 37.4 (05 Oct 2022 05:28)  T(F): 99.3 (05 Oct 2022 05:28), Max: 99.3 (05 Oct 2022 05:28)  HR: 62 (05 Oct 2022 09:40) (62 - 65)  BP: 127/72 (05 Oct 2022 09:40) (123/71 - 143/80)  BP(mean): --  RR: 18 (05 Oct 2022 05:28) (17 - 18)  SpO2: 95% (05 Oct 2022 05:28) (95% - 99%)    Parameters below as of 05 Oct 2022 05:28  Patient On (Oxygen Delivery Method): room air    PHYSICAL EXAM:  GENERAL: NAD  EYES: clear conjunctiva; EOMI  ENMT: Moist mucous membranes  NECK: Supple, No JVD, Normal thyroid  CHEST/LUNG: Clear to auscultation bilaterally; No rales, rhonchi, wheezing, or rubs  HEART: S1, S2, Regular rate and rhythm  ABDOMEN: Soft, Nontender, Nondistended; Bowel sounds present  NEURO: Alert & Oriented X3  EXTREMITIES: No LE edema, no calf tenderness, Left shoulder pain with movement  LYMPH: No lymphadenopathy noted  SKIN: No rashes or lesions    Consultant(s) Notes Reviewed:  [x ] YES  [ ] NO  Care Discussed with Consultants/Other Providers [ x] YES  [ ] NO    LABS:                        12.6   8.95  )-----------( 155      ( 04 Oct 2022 06:45 )             39.1     10-04    141  |  106  |  18  ----------------------------<  95  3.7   |  27  |  0.70    Ca    9.7      04 Oct 2022 06:45    CAPILLARY BLOOD GLUCOSE    RADIOLOGY & ADDITIONAL TESTS:    Imaging Personally Reviewed:  [ ] YES  [ ] NO    < from: CT Head No Cont (10.02.22 @ 09:30) >    ACC: [id number] EXAM:  CT CERVICAL SPINE                        ACC: [id number] EXAM:  CT BRAIN                          PROCEDURE DATE:  10/02/2022          INTERPRETATION:  CLINICAL HISTORY: Trauma. Status post fall with head   strike.    TECHNIQUE: A noncontrast head CT and a noncontrast cervical spine CT were   performed. The head CT was performed with contiguous 5 mm transaxial   images from the skull base to vertex. Images were reviewed in brain,   stroke, subdural and bone windows.  The cervical spine CT was performed with transaxial thin section images   from the occiput to the T4 level.  Coronal and sagittal reformatted   images were created from the transaxial source data.  Images were   reviewed in bone and soft tissue windows.    COMPARISON: None available.    FINDINGS:    Head CT:  There is no acute intracranial hemorrhage, vasogenic edema or evidence   for acute large vascular territory infarct. Patchy areas of   low-attenuation in bihemispheric white matter, nonspecific, but likely   the sequela of chronic microvascular change.    The ventricles, sulci and cisternal spaces are mildly diffusely prominent   but in proportion compatible with involutional change.  There is no   midline shift or abnormal extra-axial fluid collection.    The calvarium is intact.  There are no osteoblastic or lytic calvarial or   skull base lesions.  The paranasal sinuses and mastoid air cells are   clear.    Cervical spine CT:  The cervical alignment is intact. There are no acute fractures or   evidence of traumatic malalignment. The cervical vertebral body heights   are maintained. Chronic appearing compression fracture deformity of T4.   Multilevel facet alignment is preserved. The atlanto-dental interval is   within normal limits andthe craniocervical junction is unremarkable.   Generalized osteopenia. No suspicious osteoblastic or lytic lesions.    There is no evidence of prevertebral soft tissue swelling or epidural   hematoma.    Multilevel degenerative changes including intervertebral disc space   narrowing, disc osteophyte complexes and facet arthropathy contribute to   multilevel canal and foraminal stenosis, degree of which is suboptimally   assessed on this modality but appears most notable at C4/C5.    The visualized soft tissues of the neck have an unremarkable unenhanced   appearance. Mild biapical scarring.    IMPRESSION:  Head CT: No acute intracranial hemorrhage, vasogenic edema, extra-axial   collection or calvarial fracture. Chronic microvascular changes.    Cervical spine CT: No acute cervical spine fracture or evidence of   traumatic malalignment. Of cervical spondylosis most notable C4/C5.    --- End of Report ---            STEFAN YA MD; Attending Radiologist  This document has been electronically signed. Oct  2 2022 10:25AM    < end of copied text >  < from: CT Cervical Spine No Cont (10.02.22 @ 09:30) >  C: [id number] EXAM:  CT CERVICAL SPINE                        ACC: [id number] EXAM:  CT BRAIN                          PROCEDURE DATE:  10/02/2022          INTERPRETATION:  CLINICAL HISTORY: Trauma. Status post fall with head   strike.    TECHNIQUE: A noncontrast head CT and a noncontrast cervical spine CT were   performed. The head CT was performed with contiguous 5 mm transaxial   images from the skull base to vertex. Images were reviewed in brain,   stroke, subdural and bone windows.  The cervical spine CT was performed with transaxial thin section images   from the occiput to the T4 level.  Coronal and sagittal reformatted   images were created from the transaxial source data.  Images were   reviewed in bone and soft tissue windows.    COMPARISON: None available.    FINDINGS:    Head CT:  There is no acute intracranial hemorrhage, vasogenic edema or evidence   for acute large vascular territory infarct. Patchy areas of   low-attenuation in bihemispheric white matter, nonspecific, but likely   the sequela of chronic microvascular change.    The ventricles, sulci and cisternal spaces are mildly diffusely prominent   but in proportion compatible with involutional change.  There is no   midline shift or abnormal extra-axial fluid collection.    The calvarium is intact.  There are no osteoblastic or lytic calvarial or   skull base lesions.  The paranasal sinuses and mastoid air cells are   clear.    Cervical spine CT:  The cervical alignment is intact. There are no acute fractures or   evidence of traumatic malalignment. The cervical vertebral body heights   are maintained. Chronic appearing compression fracture deformity of T4.   Multilevel facet alignment is preserved. The atlanto-dental interval is   within normal limits andthe craniocervical junction is unremarkable.   Generalized osteopenia. No suspicious osteoblastic or lytic lesions.    There is no evidence of prevertebral soft tissue swelling or epidural   hematoma.    Multilevel degenerative changes including intervertebral disc space   narrowing, disc osteophyte complexes and facet arthropathy contribute to   multilevel canal and foraminal stenosis, degree of which is suboptimally   assessed on this modality but appears most notable at C4/C5.    The visualized soft tissues of the neck have an unremarkable unenhanced   appearance. Mild biapical scarring.    IMPRESSION:  Head CT: No acute intracranial hemorrhage, vasogenic edema, extra-axial   collection or calvarial fracture. Chronic microvascular changes.    Cervical spine CT: No acute cervical spine fracture or evidence of   traumatic malalignment. Of cervical spondylosis most notable C4/C5.    --- End of Report ---            STEFAN YA MD; Attending Radiologist  This document has been electronically signed. Oct  2 2022 10:25AM    < end of copied text >  < from: Xray Pelvis AP only (10.02.22 @ 09:20) >    ACC: [id number] EXAM:  XR PELVIS AP ONLY 1-2 VIEWS                          PROCEDURE DATE:  10/02/2022          INTERPRETATION:  History: Pain status post fall.    FINDINGS: Frontal pelvis.    COMPARISON: 12/15/2020    History of right hip arthroplasty. Prostheses intact with good   articulation. No periprosthetic fracture. No acute pelvic fractures   appreciated.    Degenerative change visualized lower lumbar spine.    Fibroid calcification again noted.    IMPRESSION:    No acute fracture appreciated. Cross-sectional imaging if clinically   indicated.    --- End of Report ---            IAN GONZALEZ MD; Attending Radiologist  This document has been electronically signed. Oct  2 2022  9:13AM    < end of copied text >  < from: Xray Chest 1 View- PORTABLE-Urgent (Xray Chest 1 View- PORTABLE-Urgent .) (10.02.22 @ 09:20) >    ACC: [id number] EXAM:  XR CHEST PORTABLE URGENT 1V                          PROCEDURE DATE:  10/02/2022          INTERPRETATION:  CLINICAL STATEMENT: Fall    TECHNIQUE: AP view of the chest.      COMPARISON: 1/22/2021    The cardiomediastinal silhouette is normal given AP technique and the   ronni are not enlarged. Suboptimal degree of inspiration. There is no   focal lung consolidation or sizable pleural effusion. No pneumothorax.   Visualized bony thorax grossly intact. Degenerative changes of the spine.    IMPRESSION:    Unremarkable frontal chest x ray    --- End of Report ---            IAN GONZALEZ MD; Attending Radiologist  This document has been electronically signed. Oct  2 2022  9:15AM    < end of copied text >  < from: Xray Shoulder 2 Views, Left (10.02.22 @ 09:19) >    ACC: [id number] EXAM:  XR HUMERUS MIN 2 VIEWS LT                        ACC: [id number] EXAM:  XR SHOULDER COMP MIN 2V LT                          PROCEDURE DATE:  10/02/2022          INTERPRETATION:  History: Pain status post fall.    FINDINGS: Frontal shoulder and frontal humerus.    Impacted fracture surgical neck left humerus. No glenohumeral   dislocation. AC joint is aligned. Visualized left ribs and left lung   field unremarkable.    IMPRESSION:    Impacted fracture surgical neck left humerus.    --- End of Report ---            IAN GONZALEZ MD; Attending Radiologist  This document has been electronically signed. Oct  2 2022  9:14AM    < end of copied text >  < from: Xray Humerus, Left (10.02.22 @ 09:18) >    ACC: [id number] EXAM:  XR HUMERUS MIN 2 VIEWS LT                        ACC: [id number] EXAM:  XR SHOULDER COMP MIN 2V LT                          PROCEDURE DATE:  10/02/2022          INTERPRETATION:  History: Pain status post fall.    FINDINGS: Frontal shoulder and frontal humerus.    Impacted fracture surgical neck left humerus. No glenohumeral   dislocation. AC joint is aligned. Visualized left ribs and left lung   field unremarkable.    IMPRESSION:    Impacted fracture surgical neck left humerus.    --- End of Report ---            IAN GONZALEZ MD; Attending Radiologist  This document has been electronically signed. Oct  2 2022  9:14AM    < end of copied text >   Patient is a 77y old  Female who presents with a chief complaint of Fall     INTERVAL HPI/OVERNIGHT EVENTS:    REVIEW OF SYSTEMS:  CONSTITUTIONAL: No fever, chills  ENMT:  No difficulty hearing, no change in vision  NECK: No pain or stiffness  RESPIRATORY: No cough, SOB  CARDIOVASCULAR: No chest pain, palpitations  GASTROINTESTINAL: No abdominal pain. No nausea, vomiting, or diarrhea  GENITOURINARY: No dysuria  NEUROLOGICAL: No HA  SKIN: No itching, burning, rashes, or lesions   LYMPH NODES: No enlarged glands  ENDOCRINE: No heat or cold intolerance; No hair loss  MUSCULOSKELETAL: No joint pain or swelling; No muscle, back, pain, Left shoulder pain with movement  PSYCHIATRIC: No depression, anxiety  HEME/LYMPH: No easy bruising, or bleeding gums    Patient is a 77y old  Female who presents with a chief complaint of Fall (05 Oct 2022 10:44)    INTERVAL HPI/OVERNIGHT EVENTS:    REVIEW OF SYSTEMS:  CONSTITUTIONAL: No fever, chills  ENMT:  No difficulty hearing, no change in vision  NECK: No pain or stiffness  RESPIRATORY: No cough, SOB  CARDIOVASCULAR: No chest pain, palpitations  GASTROINTESTINAL: No abdominal pain. No nausea, vomiting, or diarrhea  GENITOURINARY: No dysuria  NEUROLOGICAL: No HA  SKIN: No itching, burning, rashes, or lesions   LYMPH NODES: No enlarged glands  ENDOCRINE: No heat or cold intolerance; No hair loss  MUSCULOSKELETAL: No joint pain or swelling; No muscle, back pain. Pain with movement of left shoulder  PSYCHIATRIC: No depression, anxiety  HEME/LYMPH: No easy bruising, or bleeding gums    T(C): 37.4 (10-05-22 @ 05:28), Max: 37.4 (10-05-22 @ 05:28)  HR: 62 (10-05-22 @ 09:40) (62 - 65)  BP: 127/72 (10-05-22 @ 09:40) (123/71 - 143/80)  RR: 18 (10-05-22 @ 05:28) (17 - 18)  SpO2: 95% (10-05-22 @ 05:28) (95% - 99%)  Wt(kg): --Vital Signs Last 24 Hrs  T(C): 37.4 (05 Oct 2022 05:28), Max: 37.4 (05 Oct 2022 05:28)  T(F): 99.3 (05 Oct 2022 05:28), Max: 99.3 (05 Oct 2022 05:28)  HR: 62 (05 Oct 2022 09:40) (62 - 65)  BP: 127/72 (05 Oct 2022 09:40) (123/71 - 143/80)  BP(mean): --  RR: 18 (05 Oct 2022 05:28) (17 - 18)  SpO2: 95% (05 Oct 2022 05:28) (95% - 99%)    Parameters below as of 05 Oct 2022 05:28  Patient On (Oxygen Delivery Method): room air    PHYSICAL EXAM:  GENERAL: NAD  EYES: clear conjunctiva; EOMI  ENMT: Moist mucous membranes  NECK: Supple, No JVD, Normal thyroid  CHEST/LUNG: Clear to auscultation bilaterally; No rales, rhonchi, wheezing, or rubs  HEART: S1, S2, Regular rate and rhythm  ABDOMEN: Soft, Nontender, Nondistended; Bowel sounds present  NEURO: Alert & Oriented X3  EXTREMITIES: No LE edema, no calf tenderness  LYMPH: No lymphadenopathy noted  SKIN: No rashes or lesions    Consultant(s) Notes Reviewed:  [x ] YES  [ ] NO  Care Discussed with Consultants/Other Providers [ x] YES  [ ] NO    LABS:                        12.6   8.95  )-----------( 155      ( 04 Oct 2022 06:45 )             39.1     10-04    141  |  106  |  18  ----------------------------<  95  3.7   |  27  |  0.70    Ca    9.7      04 Oct 2022 06:45    CAPILLARY BLOOD GLUCOSE    RADIOLOGY & ADDITIONAL TESTS:    Imaging Personally Reviewed:  [ ] YES  [ ] NO    T(C): 37.4 (10-05-22 @ 05:28), Max: 37.4 (10-05-22 @ 05:28)  HR: 62 (10-05-22 @ 09:40) (62 - 65)  BP: 127/72 (10-05-22 @ 09:40) (123/71 - 143/80)  RR: 18 (10-05-22 @ 05:28) (17 - 18)  SpO2: 95% (10-05-22 @ 05:28) (95% - 99%)  Wt(kg): --Vital Signs Last 24 Hrs  T(C): 37.4 (05 Oct 2022 05:28), Max: 37.4 (05 Oct 2022 05:28)  T(F): 99.3 (05 Oct 2022 05:28), Max: 99.3 (05 Oct 2022 05:28)  HR: 62 (05 Oct 2022 09:40) (62 - 65)  BP: 127/72 (05 Oct 2022 09:40) (123/71 - 143/80)  BP(mean): --  RR: 18 (05 Oct 2022 05:28) (17 - 18)  SpO2: 95% (05 Oct 2022 05:28) (95% - 99%)    Parameters below as of 05 Oct 2022 05:28  Patient On (Oxygen Delivery Method): room air    PHYSICAL EXAM:  GENERAL: NAD  EYES: clear conjunctiva; EOMI  ENMT: Moist mucous membranes  NECK: Supple, No JVD, Normal thyroid  CHEST/LUNG: Clear to auscultation bilaterally; No rales, rhonchi, wheezing, or rubs  HEART: S1, S2, Regular rate and rhythm  ABDOMEN: Soft, Nontender, Nondistended; Bowel sounds present  NEURO: Alert & Oriented X3  EXTREMITIES: No LE edema, no calf tenderness, Left shoulder pain with movement  LYMPH: No lymphadenopathy noted  SKIN: No rashes or lesions    Consultant(s) Notes Reviewed:  [x ] YES  [ ] NO  Care Discussed with Consultants/Other Providers [ x] YES  [ ] NO    LABS:                        12.6   8.95  )-----------( 155      ( 04 Oct 2022 06:45 )             39.1     10-04    141  |  106  |  18  ----------------------------<  95  3.7   |  27  |  0.70    Ca    9.7      04 Oct 2022 06:45    CAPILLARY BLOOD GLUCOSE    RADIOLOGY & ADDITIONAL TESTS:    Imaging Personally Reviewed:  [ ] YES  [ ] NO    < from: CT Head No Cont (10.02.22 @ 09:30) >    ACC: [id number] EXAM:  CT CERVICAL SPINE                        ACC: [id number] EXAM:  CT BRAIN                          PROCEDURE DATE:  10/02/2022      INTERPRETATION:  CLINICAL HISTORY: Trauma. Status post fall with head   strike.    TECHNIQUE: A noncontrast head CT and a noncontrast cervical spine CT were   performed. The head CT was performed with contiguous 5 mm transaxial   images from the skull base to vertex. Images were reviewed in brain,   stroke, subdural and bone windows.  The cervical spine CT was performed with transaxial thin section images   from the occiput to the T4 level.  Coronal and sagittal reformatted   images were created from the transaxial source data.  Images were   reviewed in bone and soft tissue windows.    COMPARISON: None available.    FINDINGS:    Head CT:  There is no acute intracranial hemorrhage, vasogenic edema or evidence   for acute large vascular territory infarct. Patchy areas of   low-attenuation in bihemispheric white matter, nonspecific, but likely   the sequela of chronic microvascular change.    The ventricles, sulci and cisternal spaces are mildly diffusely prominent   but in proportion compatible with involutional change.  There is no   midline shift or abnormal extra-axial fluid collection.    The calvarium is intact.  There are no osteoblastic or lytic calvarial or   skull base lesions.  The paranasal sinuses and mastoid air cells are   clear.    Cervical spine CT:  The cervical alignment is intact. There are no acute fractures or   evidence of traumatic malalignment. The cervical vertebral body heights   are maintained. Chronic appearing compression fracture deformity of T4.   Multilevel facet alignment is preserved. The atlanto-dental interval is   within normal limits andthe craniocervical junction is unremarkable.   Generalized osteopenia. No suspicious osteoblastic or lytic lesions.    There is no evidence of prevertebral soft tissue swelling or epidural   hematoma.    Multilevel degenerative changes including intervertebral disc space   narrowing, disc osteophyte complexes and facet arthropathy contribute to   multilevel canal and foraminal stenosis, degree of which is suboptimally   assessed on this modality but appears most notable at C4/C5.    The visualized soft tissues of the neck have an unremarkable unenhanced   appearance. Mild biapical scarring.    IMPRESSION:  Head CT: No acute intracranial hemorrhage, vasogenic edema, extra-axial   collection or calvarial fracture. Chronic microvascular changes.    Cervical spine CT: No acute cervical spine fracture or evidence of   traumatic malalignment. Of cervical spondylosis most notable C4/C5.    --- End of Report ---    STEFAN YA MD; Attending Radiologist  This document has been electronically signed. Oct  2 2022 10:25AM    < end of copied text >  < from: CT Cervical Spine No Cont (10.02.22 @ 09:30) >  C: [id number] EXAM:  CT CERVICAL SPINE                        ACC: [id number] EXAM:  CT BRAIN                          PROCEDURE DATE:  10/02/2022      INTERPRETATION:  CLINICAL HISTORY: Trauma. Status post fall with head   strike.    TECHNIQUE: A noncontrast head CT and a noncontrast cervical spine CT were   performed. The head CT was performed with contiguous 5 mm transaxial   images from the skull base to vertex. Images were reviewed in brain,   stroke, subdural and bone windows.  The cervical spine CT was performed with transaxial thin section images   from the occiput to the T4 level.  Coronal and sagittal reformatted   images were created from the transaxial source data.  Images were   reviewed in bone and soft tissue windows.    COMPARISON: None available.    FINDINGS:    Head CT:  There is no acute intracranial hemorrhage, vasogenic edema or evidence   for acute large vascular territory infarct. Patchy areas of   low-attenuation in bihemispheric white matter, nonspecific, but likely   the sequela of chronic microvascular change.    The ventricles, sulci and cisternal spaces are mildly diffusely prominent   but in proportion compatible with involutional change.  There is no   midline shift or abnormal extra-axial fluid collection.    The calvarium is intact.  There are no osteoblastic or lytic calvarial or   skull base lesions.  The paranasal sinuses and mastoid air cells are   clear.    Cervical spine CT:  The cervical alignment is intact. There are no acute fractures or   evidence of traumatic malalignment. The cervical vertebral body heights   are maintained. Chronic appearing compression fracture deformity of T4.   Multilevel facet alignment is preserved. The atlanto-dental interval is   within normal limits andthe craniocervical junction is unremarkable.   Generalized osteopenia. No suspicious osteoblastic or lytic lesions.    There is no evidence of prevertebral soft tissue swelling or epidural   hematoma.    Multilevel degenerative changes including intervertebral disc space   narrowing, disc osteophyte complexes and facet arthropathy contribute to   multilevel canal and foraminal stenosis, degree of which is suboptimally   assessed on this modality but appears most notable at C4/C5.    The visualized soft tissues of the neck have an unremarkable unenhanced   appearance. Mild biapical scarring.    IMPRESSION:  Head CT: No acute intracranial hemorrhage, vasogenic edema, extra-axial   collection or calvarial fracture. Chronic microvascular changes.    Cervical spine CT: No acute cervical spine fracture or evidence of   traumatic malalignment. Of cervical spondylosis most notable C4/C5.    --- End of Report ---    STEFAN YA MD; Attending Radiologist  This document has been electronically signed. Oct  2 2022 10:25AM    < end of copied text >  < from: Xray Pelvis AP only (10.02.22 @ 09:20) >    ACC: [id number] EXAM:  XR PELVIS AP ONLY 1-2 VIEWS                          PROCEDURE DATE:  10/02/2022      INTERPRETATION:  History: Pain status post fall.    FINDINGS: Frontal pelvis.    COMPARISON: 12/15/2020    History of right hip arthroplasty. Prostheses intact with good   articulation. No periprosthetic fracture. No acute pelvic fractures   appreciated.    Degenerative change visualized lower lumbar spine.    Fibroid calcification again noted.    IMPRESSION:    No acute fracture appreciated. Cross-sectional imaging if clinically   indicated.    --- End of Report ---    IAN GONZALEZ MD; Attending Radiologist  This document has been electronically signed. Oct  2 2022  9:13AM    < end of copied text >  < from: Xray Chest 1 View- PORTABLE-Urgent (Xray Chest 1 View- PORTABLE-Urgent .) (10.02.22 @ 09:20) >    ACC: [id number] EXAM:  XR CHEST PORTABLE URGENT 1V                          PROCEDURE DATE:  10/02/2022      INTERPRETATION:  CLINICAL STATEMENT: Fall    TECHNIQUE: AP view of the chest.    COMPARISON: 1/22/2021    The cardiomediastinal silhouette is normal given AP technique and the   ronni are not enlarged. Suboptimal degree of inspiration. There is no   focal lung consolidation or sizable pleural effusion. No pneumothorax.   Visualized bony thorax grossly intact. Degenerative changes of the spine.    IMPRESSION:    Unremarkable frontal chest x ray    --- End of Report ---    IAN GONZALEZ MD; Attending Radiologist  This document has been electronically signed. Oct  2 2022  9:15AM    < end of copied text >  < from: Xray Shoulder 2 Views, Left (10.02.22 @ 09:19) >    ACC: [id number] EXAM:  XR HUMERUS MIN 2 VIEWS LT                        ACC: [id number] EXAM:  XR SHOULDER COMP MIN 2V LT                          PROCEDURE DATE:  10/02/2022      INTERPRETATION:  History: Pain status post fall.    FINDINGS: Frontal shoulder and frontal humerus.    Impacted fracture surgical neck left humerus. No glenohumeral   dislocation. AC joint is aligned. Visualized left ribs and left lung   field unremarkable.    IMPRESSION:    Impacted fracture surgical neck left humerus.    --- End of Report ---    IAN GONZALEZ MD; Attending Radiologist  This document has been electronically signed. Oct  2 2022  9:14AM    < end of copied text >  < from: Xray Humerus, Left (10.02.22 @ 09:18) >    ACC: [id number] EXAM:  XR HUMERUS MIN 2 VIEWS LT                        ACC: [id number] EXAM:  XR SHOULDER COMP MIN 2V LT                          PROCEDURE DATE:  10/02/2022      INTERPRETATION:  History: Pain status post fall.    FINDINGS: Frontal shoulder and frontal humerus.    Impacted fracture surgical neck left humerus. No glenohumeral   dislocation. AC joint is aligned. Visualized left ribs and left lung   field unremarkable.    IMPRESSION:    Impacted fracture surgical neck left humerus.    --- End of Report ---    IAN GONZALEZ MD; Attending Radiologist  This document has been electronically signed. Oct  2 2022  9:14AM    < end of copied text >

## 2022-10-05 NOTE — PHYSICAL THERAPY INITIAL EVALUATION ADULT - PERTINENT HX OF CURRENT PROBLEM, REHAB EVAL
Pt is being seen for PT eval. Reviewed chart, labs, and radiographs. Pt was received sitting in bed and L UE in sling. Vitals were taken. Pt performed bed mobility with close supervision to sit at edge of bed. STS transfer was performed with min assist x1. Pt ambulated 30ft x2 with min assist and holding on to PT. Pt required VC during GAIT. Education about fall prevention was provided.

## 2022-10-05 NOTE — PROGRESS NOTE ADULT - PROBLEM SELECTOR PLAN 2
-Hx of dementia   -Continue home meds Memantine 10 mg -Hx of dementia  -Continue home meds, Memantine 10 mg  - pt is mostly calm and non- aggressive, co operative  - Pt has not required any enhanced supervision or constant observation during this admission in hospital  - Pt participates with care and daily activities with staff and with physical therapy.

## 2022-10-05 NOTE — PROGRESS NOTE ADULT - SUBJECTIVE AND OBJECTIVE BOX
Patient is a 77y old  Female who presents with a chief complaint of Fall (04 Oct 2022 13:06)    pt seen in icu [  ], reg med floor [ x  ], bed [ x ], chair at bedside [   ], awake and responsive [ x ],   lethargic [  ],  nad [ x ]      Allergies    No Known Allergies        Vitals    T(F): 99.3 (10-05-22 @ 05:28), Max: 99.3 (10-05-22 @ 05:28)  HR: 65 (10-05-22 @ 05:28) (64 - 65)  BP: 123/71 (10-05-22 @ 05:28) (123/71 - 143/80)  RR: 18 (10-05-22 @ 05:28) (17 - 18)  SpO2: 95% (10-05-22 @ 05:28) (95% - 99%)  Wt(kg): --  CAPILLARY BLOOD GLUCOSE          Labs                          12.6   8.95  )-----------( 155      ( 04 Oct 2022 06:45 )             39.1       10-04    141  |  106  |  18  ----------------------------<  95  3.7   |  27  |  0.70    Ca    9.7      04 Oct 2022 06:45                  Radiology Results      Meds    MEDICATIONS  (STANDING):  atorvastatin 10 milliGRAM(s) Oral at bedtime  enoxaparin Injectable 40 milliGRAM(s) SubCutaneous every 24 hours  losartan 25 milliGRAM(s) Oral daily  memantine 10 milliGRAM(s) Oral daily  senna 2 Tablet(s) Oral at bedtime      MEDICATIONS  (PRN):  acetaminophen     Tablet .. 650 milliGRAM(s) Oral every 6 hours PRN Temp greater or equal to 38C (100.4F), Mild Pain (1 - 3)  aluminum hydroxide/magnesium hydroxide/simethicone Suspension 30 milliLiter(s) Oral every 4 hours PRN Dyspepsia  melatonin 3 milliGRAM(s) Oral at bedtime PRN Insomnia  ondansetron Injectable 4 milliGRAM(s) IV Push every 8 hours PRN Nausea and/or Vomiting  oxyCODONE    IR 5 milliGRAM(s) Oral every 6 hours PRN Severe Pain (7 - 10)      Physical Exam    Neuro :  no focal deficits  Respiratory: CTA B/L  CV: RRR, S1S2, no murmurs,   Abdominal: Soft, NT, ND +BS,  Extremities: LUE sling, No edema, + peripheral pulses    ASSESSMENT    left humeral fx,   s/p fall,   h/o dementia  HTN  HLD      PLAN    ortho f/u   -  Daily PT- Non weight bearing of left arm. Keep Left arm in sling at all times  -  Treat fracture conservatively. No surgical intervention at this time.  -  Patient to follow up with Orthopedist dr marroquin within 2 weeks for repeat xray and fracture monitoring  Pain management  CTH negative   cont Tylenol for mild pain   cont Oxycodone for severe pain   dvt prophylaxis   phys tx eval   cont home meds   d/c planning         Patient is a 77y old  Female who presents with a chief complaint of Fall (04 Oct 2022 13:06)    pt seen in icu [  ], reg med floor [ x  ], bed [ x ], chair at bedside [   ], awake and responsive [ x ],   lethargic [  ],  nad [ x ]      Allergies    No Known Allergies        Vitals    T(F): 99.3 (10-05-22 @ 05:28), Max: 99.3 (10-05-22 @ 05:28)  HR: 65 (10-05-22 @ 05:28) (64 - 65)  BP: 123/71 (10-05-22 @ 05:28) (123/71 - 143/80)  RR: 18 (10-05-22 @ 05:28) (17 - 18)  SpO2: 95% (10-05-22 @ 05:28) (95% - 99%)  Wt(kg): --  CAPILLARY BLOOD GLUCOSE          Labs                          12.6   8.95  )-----------( 155      ( 04 Oct 2022 06:45 )             39.1       10-04    141  |  106  |  18  ----------------------------<  95  3.7   |  27  |  0.70    Ca    9.7      04 Oct 2022 06:45                  Radiology Results      Meds    MEDICATIONS  (STANDING):  atorvastatin 10 milliGRAM(s) Oral at bedtime  enoxaparin Injectable 40 milliGRAM(s) SubCutaneous every 24 hours  losartan 25 milliGRAM(s) Oral daily  memantine 10 milliGRAM(s) Oral daily  senna 2 Tablet(s) Oral at bedtime      MEDICATIONS  (PRN):  acetaminophen     Tablet .. 650 milliGRAM(s) Oral every 6 hours PRN Temp greater or equal to 38C (100.4F), Mild Pain (1 - 3)  aluminum hydroxide/magnesium hydroxide/simethicone Suspension 30 milliLiter(s) Oral every 4 hours PRN Dyspepsia  melatonin 3 milliGRAM(s) Oral at bedtime PRN Insomnia  ondansetron Injectable 4 milliGRAM(s) IV Push every 8 hours PRN Nausea and/or Vomiting  oxyCODONE    IR 5 milliGRAM(s) Oral every 6 hours PRN Severe Pain (7 - 10)      Physical Exam    Neuro :  no focal deficits  Respiratory: CTA B/L  CV: RRR, S1S2, no murmurs,   Abdominal: Soft, NT, ND +BS,  Extremities: LUE sling, No edema, + peripheral pulses    ASSESSMENT    left humeral fx,   s/p fall,   h/o dementia  HTN  HLD      PLAN    ortho f/u   -  Daily PT- Non weight bearing of left arm. Keep Left arm in sling at all times  -  Treat fracture conservatively. No surgical intervention at this time.  -  Patient to follow up with Orthopedist dr marroquin within 2 weeks for repeat xray and fracture monitoring  Pain management  CTH negative   cont Tylenol for mild pain   cont Oxycodone for severe pain   dvt prophylaxis   phys tx eval   cont home meds   d/c planning pending phys tx eval

## 2022-10-05 NOTE — PHYSICAL THERAPY INITIAL EVALUATION ADULT - DIAGNOSIS, PT EVAL
Pt requires min assist in transfers and bed mobility requires standby assist with set up. Pt is unsteady during ambulation and requires min assist and recommended for a standard cane.

## 2022-10-05 NOTE — PHYSICAL THERAPY INITIAL EVALUATION ADULT - NSACTIVITYREC_GEN_A_PT
Pt directed to continue wearing sling for L UE with proper fitting. Pt educated in fall prevention including wearing proper footwear at home and ensuring floors are clear from fall hazards.

## 2022-10-05 NOTE — PROGRESS NOTE ADULT - PROBLEM SELECTOR PLAN 3
On  Eliquis 5 mg BID , hold for surgery, may consider restarting , however unsure of indication, will f/u with daughter  start lovenox for now    Dispo:  - PT eval this am -> reqs to [facility]  - Patient is from home - Daughter requested [facility]

## 2022-10-05 NOTE — PHYSICAL THERAPY INITIAL EVALUATION ADULT - GENERAL OBSERVATIONS, REHAB EVAL
Pt observed sitting up in bed w/ L UE in sling. Pt observed scratching L LE reporting being itchy recently.

## 2022-10-06 RX ADMIN — OXYCODONE HYDROCHLORIDE 5 MILLIGRAM(S): 5 TABLET ORAL at 12:58

## 2022-10-06 RX ADMIN — SENNA PLUS 2 TABLET(S): 8.6 TABLET ORAL at 21:55

## 2022-10-06 RX ADMIN — MEMANTINE HYDROCHLORIDE 10 MILLIGRAM(S): 10 TABLET ORAL at 11:36

## 2022-10-06 RX ADMIN — OXYCODONE HYDROCHLORIDE 5 MILLIGRAM(S): 5 TABLET ORAL at 12:15

## 2022-10-06 RX ADMIN — Medication 3 MILLIGRAM(S): at 21:55

## 2022-10-06 RX ADMIN — ATORVASTATIN CALCIUM 10 MILLIGRAM(S): 80 TABLET, FILM COATED ORAL at 21:55

## 2022-10-06 RX ADMIN — ENOXAPARIN SODIUM 40 MILLIGRAM(S): 100 INJECTION SUBCUTANEOUS at 16:49

## 2022-10-06 RX ADMIN — LOSARTAN POTASSIUM 25 MILLIGRAM(S): 100 TABLET, FILM COATED ORAL at 06:01

## 2022-10-06 NOTE — PROGRESS NOTE ADULT - SUBJECTIVE AND OBJECTIVE BOX
NP Note discussed with  Primary Attending    Patient is a 77y old  Female who presents with a chief complaint of Fall (06 Oct 2022 06:51)      INTERVAL HPI/OVERNIGHT EVENTS: no new complaints    MEDICATIONS  (STANDING):  atorvastatin 10 milliGRAM(s) Oral at bedtime  enoxaparin Injectable 40 milliGRAM(s) SubCutaneous every 24 hours  losartan 25 milliGRAM(s) Oral daily  memantine 10 milliGRAM(s) Oral daily  senna 2 Tablet(s) Oral at bedtime    MEDICATIONS  (PRN):  acetaminophen     Tablet .. 650 milliGRAM(s) Oral every 6 hours PRN Temp greater or equal to 38C (100.4F), Mild Pain (1 - 3)  aluminum hydroxide/magnesium hydroxide/simethicone Suspension 30 milliLiter(s) Oral every 4 hours PRN Dyspepsia  melatonin 3 milliGRAM(s) Oral at bedtime PRN Insomnia  ondansetron Injectable 4 milliGRAM(s) IV Push every 8 hours PRN Nausea and/or Vomiting  oxyCODONE    IR 5 milliGRAM(s) Oral every 6 hours PRN Severe Pain (7 - 10)      __________________________________________________  REVIEW OF SYSTEMS:    CONSTITUTIONAL: No fever,   EYES: no acute visual disturbances  NECK: No pain or stiffness  RESPIRATORY: No cough; No shortness of breath  CARDIOVASCULAR: No chest pain, no palpitations  GASTROINTESTINAL: No pain. No nausea or vomiting; No diarrhea   NEUROLOGICAL: No headache or numbness, no tremors  MUSCULOSKELETAL: No joint pain, no muscle pain  GENITOURINARY: no dysuria, no frequency, no hesitancy  PSYCHIATRY: no depression , no anxiety  ALL OTHER  ROS negative        Vital Signs Last 24 Hrs  T(C): 37.2 (06 Oct 2022 12:10), Max: 37.7 (05 Oct 2022 20:16)  T(F): 98.9 (06 Oct 2022 12:10), Max: 99.9 (05 Oct 2022 20:16)  HR: 61 (06 Oct 2022 12:10) (61 - 71)  BP: 134/63 (06 Oct 2022 12:10) (134/63 - 153/80)  BP(mean): --  RR: 18 (06 Oct 2022 12:10) (18 - 18)  SpO2: 98% (06 Oct 2022 12:10) (94% - 98%)    Parameters below as of 06 Oct 2022 12:10  Patient On (Oxygen Delivery Method): room air        ________________________________________________  PHYSICAL EXAM:  chronically ill appearing  GENERAL: NAD  HEENT: Normocephalic;  conjunctivae and sclerae clear; moist mucous membranes;   NECK : supple  CHEST/LUNG: Clear to auscultation bilaterally with good air entry   HEART: S1 S2  regular; no murmurs, gallops or rubs  ABDOMEN: Soft, Nontender, Nondistended; Bowel sounds present  EXTREMITIES: Left arm in sling, no cyanosis; no edema; no calf tenderness  SKIN: warm and dry; no rash  NERVOUS SYSTEM:  Awake and alert; Oriented x 1-2  _________________________________________________  LABS:    CAPILLARY BLOOD GLUCOSE    RADIOLOGY & ADDITIONAL TESTS:  < from: CT Head No Cont (10.02.22 @ 09:30) >    ACC: [id number] EXAM:  CT CERVICAL SPINE                        ACC: [id number] EXAM:  CT BRAIN                          PROCEDURE DATE:  10/02/2022          INTERPRETATION:  CLINICAL HISTORY: Trauma. Status post fall with head   strike.    TECHNIQUE: A noncontrast head CT and a noncontrast cervical spine CT were   performed. The head CT was performed with contiguous 5 mm transaxial   images from the skull base to vertex. Images were reviewed in brain,   stroke, subdural and bone windows.  The cervical spine CT was performed with transaxial thin section images   from the occiput to the T4 level.  Coronal and sagittal reformatted   images were created from the transaxial source data.  Images were   reviewed in bone and soft tissue windows.    COMPARISON: None available.    FINDINGS:    Head CT:  There is no acute intracranial hemorrhage, vasogenic edema or evidence   for acute large vascular territory infarct. Patchy areas of   low-attenuation in bihemispheric white matter, nonspecific, but likely   the sequela of chronic microvascular change.    The ventricles, sulci and cisternal spaces are mildly diffusely prominent   but in proportion compatible with involutional change.  There is no   midline shift or abnormal extra-axial fluid collection.    The calvarium is intact.  There are no osteoblastic or lytic calvarial or   skull base lesions.  The paranasal sinuses and mastoid air cells are   clear.    Cervical spine CT:  The cervical alignment is intact. There are no acute fractures or   evidence of traumatic malalignment. The cervical vertebral body heights   are maintained. Chronic appearing compression fracture deformity of T4.   Multilevel facet alignment is preserved. The atlanto-dental interval is   within normal limits andthe craniocervical junction is unremarkable.   Generalized osteopenia. No suspicious osteoblastic or lytic lesions.    There is no evidence of prevertebral soft tissue swelling or epidural   hematoma.    Multilevel degenerative changes including intervertebral disc space   narrowing, disc osteophyte complexes and facet arthropathy contribute to   multilevel canal and foraminal stenosis, degree of which is suboptimally   assessed on this modality but appears most notable at C4/C5.    The visualized soft tissues of the neck have an unremarkable unenhanced   appearance. Mild biapical scarring.    IMPRESSION:  Head CT: No acute intracranial hemorrhage, vasogenic edema, extra-axial   collection or calvarial fracture. Chronic microvascular changes.    Cervical spine CT: No acute cervical spine fracture or evidence of   traumatic malalignment. Of cervical spondylosis most notable C4/C5.    --- End of Report ---    < end of copied text >    < from: Xray Pelvis AP only (10.02.22 @ 09:20) >  ACC: [id number] EXAM:  XR PELVIS AP ONLY 1-2 VIEWS                          PROCEDURE DATE:  10/02/2022          INTERPRETATION:  History: Pain status post fall.    FINDINGS: Frontal pelvis.    COMPARISON: 12/15/2020    History of right hip arthroplasty. Prostheses intact with good   articulation. No periprosthetic fracture. No acute pelvic fractures   appreciated.    Degenerative change visualized lower lumbar spine.    Fibroid calcification again noted.    IMPRESSION:    No acute fracture appreciated. Cross-sectional imaging if clinically   indicated.    < end of copied text >    < from: Xray Shoulder 2 Views, Left (10.02.22 @ 09:19) >  ACC: [id number] EXAM:  XR HUMERUS MIN 2 VIEWS LT                        ACC: [id number] EXAM:  XR SHOULDER COMP MIN 2V LT                          PROCEDURE DATE:  10/02/2022          INTERPRETATION:  History: Pain status post fall.    FINDINGS: Frontal shoulder and frontal humerus.    Impacted fracture surgical neck left humerus. No glenohumeral   dislocation. AC joint is aligned. Visualized left ribs and left lung   field unremarkable.    IMPRESSION:    Impacted fracture surgical neck left humerus.    --- End of Report ---    < end of copied text >        Imaging Personally Reviewed:  YES/NO    Consultant(s) Notes Reviewed:   YES/ No    Care Discussed with Consultants :     Plan of care was discussed with patient and /or primary care giver; all questions and concerns were addressed and care was aligned with patient's wishes.

## 2022-10-06 NOTE — PROGRESS NOTE ADULT - ASSESSMENT
A 77 year old female, from home, ambulating w/ assistance, with PMHx dementia presents s/p fall. found to have Impacted fracture surgical neck left humerus. CT head negative. Orthro consulted. Recommended conservative management.  PT recc [facility], awaiting auth to [facility].

## 2022-10-06 NOTE — PROGRESS NOTE ADULT - SUBJECTIVE AND OBJECTIVE BOX
Patient is a 77y old  Female who presents with a chief complaint of Fall (05 Oct 2022 10:44)    pt seen in icu [  ], reg med floor [ x  ], bed [ x ], chair at bedside [   ], awake and responsive [ x ],   lethargic [  ],  nad [ x ]        Allergies    No Known Allergies        Vitals    T(F): 99.3 (10-06-22 @ 05:20), Max: 99.9 (10-05-22 @ 20:16)  HR: 65 (10-06-22 @ 05:20) (62 - 73)  BP: 137/84 (10-06-22 @ 05:20) (127/72 - 153/80)  RR: 18 (10-06-22 @ 05:20) (17 - 18)  SpO2: 94% (10-06-22 @ 05:20) (94% - 98%)  Wt(kg): --  CAPILLARY BLOOD GLUCOSE          Labs                          Radiology Results      Meds    MEDICATIONS  (STANDING):  atorvastatin 10 milliGRAM(s) Oral at bedtime  enoxaparin Injectable 40 milliGRAM(s) SubCutaneous every 24 hours  losartan 25 milliGRAM(s) Oral daily  memantine 10 milliGRAM(s) Oral daily  senna 2 Tablet(s) Oral at bedtime      MEDICATIONS  (PRN):  acetaminophen     Tablet .. 650 milliGRAM(s) Oral every 6 hours PRN Temp greater or equal to 38C (100.4F), Mild Pain (1 - 3)  aluminum hydroxide/magnesium hydroxide/simethicone Suspension 30 milliLiter(s) Oral every 4 hours PRN Dyspepsia  melatonin 3 milliGRAM(s) Oral at bedtime PRN Insomnia  ondansetron Injectable 4 milliGRAM(s) IV Push every 8 hours PRN Nausea and/or Vomiting  oxyCODONE    IR 5 milliGRAM(s) Oral every 6 hours PRN Severe Pain (7 - 10)      Physical Exam      Neuro :  no focal deficits  Respiratory: CTA B/L  CV: RRR, S1S2, no murmurs,   Abdominal: Soft, NT, ND +BS,  Extremities: LUE sling, No edema, + peripheral pulses    ASSESSMENT    left humeral fx,   s/p fall,   h/o dementia  HTN  HLD      PLAN    ortho f/u   -  Daily PT- Non weight bearing of left arm. Keep Left arm in sling at all times  -  Treat fracture conservatively. No surgical intervention at this time.  -  Patient to follow up with Orthopedist dr marroquin within 2 weeks for repeat xray and fracture monitoring  Pain management  CTH negative   cont Tylenol for mild pain   cont Oxycodone for severe pain   dvt prophylaxis   phys tx eval   cont home meds   d/c planning pending phys tx eval        Patient is a 77y old  Female who presents with a chief complaint of Fall (05 Oct 2022 10:44)    pt seen in icu [  ], reg med floor [ x  ], bed [ x ], chair at bedside [   ], awake and responsive [ x ],   lethargic [  ],  nad [ x ]        Allergies    No Known Allergies        Vitals    T(F): 99.3 (10-06-22 @ 05:20), Max: 99.9 (10-05-22 @ 20:16)  HR: 65 (10-06-22 @ 05:20) (62 - 73)  BP: 137/84 (10-06-22 @ 05:20) (127/72 - 153/80)  RR: 18 (10-06-22 @ 05:20) (17 - 18)  SpO2: 94% (10-06-22 @ 05:20) (94% - 98%)  Wt(kg): --  CAPILLARY BLOOD GLUCOSE          Labs        Radiology Results      Meds    MEDICATIONS  (STANDING):  atorvastatin 10 milliGRAM(s) Oral at bedtime  enoxaparin Injectable 40 milliGRAM(s) SubCutaneous every 24 hours  losartan 25 milliGRAM(s) Oral daily  memantine 10 milliGRAM(s) Oral daily  senna 2 Tablet(s) Oral at bedtime      MEDICATIONS  (PRN):  acetaminophen     Tablet .. 650 milliGRAM(s) Oral every 6 hours PRN Temp greater or equal to 38C (100.4F), Mild Pain (1 - 3)  aluminum hydroxide/magnesium hydroxide/simethicone Suspension 30 milliLiter(s) Oral every 4 hours PRN Dyspepsia  melatonin 3 milliGRAM(s) Oral at bedtime PRN Insomnia  ondansetron Injectable 4 milliGRAM(s) IV Push every 8 hours PRN Nausea and/or Vomiting  oxyCODONE    IR 5 milliGRAM(s) Oral every 6 hours PRN Severe Pain (7 - 10)      Physical Exam      Neuro :  no focal deficits  Respiratory: CTA B/L  CV: RRR, S1S2, no murmurs,   Abdominal: Soft, NT, ND +BS,  Extremities: LUE sling, No edema, + peripheral pulses    ASSESSMENT    left humeral fx,   s/p fall,   h/o dementia  HTN  HLD      PLAN    ortho f/u   -  Daily PT- Non weight bearing of left arm. Keep Left arm in sling at all times  -  Treat fracture conservatively. No surgical intervention at this time.  -  Patient to follow up with Orthopedist dr marroquin within 2 weeks for repeat xray and fracture monitoring  Pain management  CTH negative   cont Tylenol for mild pain   cont Oxycodone for severe pain   dvt prophylaxis   phys tx eval noted and rec phys tx 2-3x/day x 5x/wk for 4-6 weeks with straight cane [facility]   cont home meds   d/c planning to [facility]

## 2022-10-06 NOTE — PROGRESS NOTE ADULT - PROBLEM SELECTOR PLAN 2
-Hx of dementia  -Continue home meds, Memantine 10 mg  - pt is mostly calm and non- aggressive, co operative  - Pt has not required any enhanced supervision or constant observation during this admission in hospital  - Pt participates with care and daily activities with staff and with physical therapy.

## 2022-10-07 DIAGNOSIS — Z02.9 ENCOUNTER FOR ADMINISTRATIVE EXAMINATIONS, UNSPECIFIED: ICD-10-CM

## 2022-10-07 DIAGNOSIS — S42.309A UNSPECIFIED FRACTURE OF SHAFT OF HUMERUS, UNSPECIFIED ARM, INITIAL ENCOUNTER FOR CLOSED FRACTURE: ICD-10-CM

## 2022-10-07 RX ADMIN — ATORVASTATIN CALCIUM 10 MILLIGRAM(S): 80 TABLET, FILM COATED ORAL at 22:36

## 2022-10-07 RX ADMIN — ENOXAPARIN SODIUM 40 MILLIGRAM(S): 100 INJECTION SUBCUTANEOUS at 16:44

## 2022-10-07 RX ADMIN — MEMANTINE HYDROCHLORIDE 10 MILLIGRAM(S): 10 TABLET ORAL at 12:07

## 2022-10-07 RX ADMIN — LOSARTAN POTASSIUM 25 MILLIGRAM(S): 100 TABLET, FILM COATED ORAL at 06:08

## 2022-10-07 NOTE — PROGRESS NOTE ADULT - SUBJECTIVE AND OBJECTIVE BOX
Patient is a 77y old  Female who presents with a chief complaint of Fall (06 Oct 2022 15:32)    pt seen in icu [  ], reg med floor [ x  ], bed [ x ], chair at bedside [   ], awake and responsive [ x ],   lethargic [  ],  nad [ x ]      Allergies    No Known Allergies        Vitals    T(F): 97.9 (10-07-22 @ 05:15), Max: 99.3 (10-06-22 @ 20:16)  HR: 69 (10-07-22 @ 05:15) (61 - 69)  BP: 145/69 (10-07-22 @ 05:15) (130/78 - 145/69)  RR: 18 (10-07-22 @ 05:15) (18 - 18)  SpO2: 100% (10-07-22 @ 05:15) (97% - 100%)  Wt(kg): --  CAPILLARY BLOOD GLUCOSE          Labs                          Radiology Results      Meds    MEDICATIONS  (STANDING):  atorvastatin 10 milliGRAM(s) Oral at bedtime  enoxaparin Injectable 40 milliGRAM(s) SubCutaneous every 24 hours  losartan 25 milliGRAM(s) Oral daily  memantine 10 milliGRAM(s) Oral daily  senna 2 Tablet(s) Oral at bedtime      MEDICATIONS  (PRN):  acetaminophen     Tablet .. 650 milliGRAM(s) Oral every 6 hours PRN Temp greater or equal to 38C (100.4F), Mild Pain (1 - 3)  aluminum hydroxide/magnesium hydroxide/simethicone Suspension 30 milliLiter(s) Oral every 4 hours PRN Dyspepsia  melatonin 3 milliGRAM(s) Oral at bedtime PRN Insomnia  ondansetron Injectable 4 milliGRAM(s) IV Push every 8 hours PRN Nausea and/or Vomiting  oxyCODONE    IR 5 milliGRAM(s) Oral every 6 hours PRN Severe Pain (7 - 10)      Physical Exam    Neuro :  no focal deficits  Respiratory: CTA B/L  CV: RRR, S1S2, no murmurs,   Abdominal: Soft, NT, ND +BS,  Extremities: LUE sling, No edema, + peripheral pulses    ASSESSMENT    left humeral fx,   s/p fall,   h/o dementia  HTN  HLD      PLAN    ortho f/u   -  Daily PT- Non weight bearing of left arm. Keep Left arm in sling at all times  -  Treat fracture conservatively. No surgical intervention at this time.  -  Patient to follow up with Orthopedist dr marroquin within 2 weeks for repeat xray and fracture monitoring  Pain management  CTH negative   cont Tylenol for mild pain   cont Oxycodone for severe pain   dvt prophylaxis   phys tx eval noted and rec phys tx 2-3x/day x 5x/wk for 4-6 weeks with straight cane at [facility]   cont home meds   d/c planning to [facility]          Patient is a 77y old  Female who presents with a chief complaint of Fall (06 Oct 2022 15:32)    pt seen in icu [  ], reg med floor [ x  ], bed [ x ], chair at bedside [   ], awake and responsive [ x ],   lethargic [  ],  nad [ x ]      Allergies    No Known Allergies        Vitals    T(F): 97.9 (10-07-22 @ 05:15), Max: 99.3 (10-06-22 @ 20:16)  HR: 69 (10-07-22 @ 05:15) (61 - 69)  BP: 145/69 (10-07-22 @ 05:15) (130/78 - 145/69)  RR: 18 (10-07-22 @ 05:15) (18 - 18)  SpO2: 100% (10-07-22 @ 05:15) (97% - 100%)  Wt(kg): --  CAPILLARY BLOOD GLUCOSE          Labs                          Radiology Results      Meds    MEDICATIONS  (STANDING):  atorvastatin 10 milliGRAM(s) Oral at bedtime  enoxaparin Injectable 40 milliGRAM(s) SubCutaneous every 24 hours  losartan 25 milliGRAM(s) Oral daily  memantine 10 milliGRAM(s) Oral daily  senna 2 Tablet(s) Oral at bedtime      MEDICATIONS  (PRN):  acetaminophen     Tablet .. 650 milliGRAM(s) Oral every 6 hours PRN Temp greater or equal to 38C (100.4F), Mild Pain (1 - 3)  aluminum hydroxide/magnesium hydroxide/simethicone Suspension 30 milliLiter(s) Oral every 4 hours PRN Dyspepsia  melatonin 3 milliGRAM(s) Oral at bedtime PRN Insomnia  ondansetron Injectable 4 milliGRAM(s) IV Push every 8 hours PRN Nausea and/or Vomiting  oxyCODONE    IR 5 milliGRAM(s) Oral every 6 hours PRN Severe Pain (7 - 10)      Physical Exam    Neuro :  no focal deficits  Respiratory: CTA B/L  CV: RRR, S1S2, no murmurs,   Abdominal: Soft, NT, ND +BS,  Extremities: LUE sling, No edema, + peripheral pulses    ASSESSMENT    left humeral fx,   s/p fall,   h/o dementia  HTN  HLD      PLAN    ortho f/u   -  Daily PT- Non weight bearing of left arm. Keep Left arm in sling at all times  -  Treat fracture conservatively. No surgical intervention at this time.  -  Patient to follow up with Orthopedist dr marroquin within 2 weeks for repeat xray and fracture monitoring  Pain management  CTH negative   cont Tylenol for mild pain   cont Oxycodone for severe pain   dvt prophylaxis   phys tx eval noted and rec phys tx 2-3x/day x 5x/wk for 4-6 weeks with straight cane at [facility]   cont home meds   d/c planning to [facility] pending auth

## 2022-10-07 NOTE — PROGRESS NOTE ADULT - SUBJECTIVE AND OBJECTIVE BOX
NP Note discussed with  Primary Attending    Patient is a 77y old  Female who presents with a chief complaint of Fall (07 Oct 2022 06:50)      INTERVAL HPI/OVERNIGHT EVENTS: no acute events overnight    MEDICATIONS  (STANDING):  atorvastatin 10 milliGRAM(s) Oral at bedtime  enoxaparin Injectable 40 milliGRAM(s) SubCutaneous every 24 hours  losartan 25 milliGRAM(s) Oral daily  memantine 10 milliGRAM(s) Oral daily  senna 2 Tablet(s) Oral at bedtime    MEDICATIONS  (PRN):  acetaminophen     Tablet .. 650 milliGRAM(s) Oral every 6 hours PRN Temp greater or equal to 38C (100.4F), Mild Pain (1 - 3)  aluminum hydroxide/magnesium hydroxide/simethicone Suspension 30 milliLiter(s) Oral every 4 hours PRN Dyspepsia  melatonin 3 milliGRAM(s) Oral at bedtime PRN Insomnia  ondansetron Injectable 4 milliGRAM(s) IV Push every 8 hours PRN Nausea and/or Vomiting  oxyCODONE    IR 5 milliGRAM(s) Oral every 6 hours PRN Severe Pain (7 - 10)      __________________________________________________  REVIEW OF SYSTEMS:    CONSTITUTIONAL: No fever,   EYES: no acute visual disturbances  NECK: No pain or stiffness  RESPIRATORY: No cough; No shortness of breath  CARDIOVASCULAR: No chest pain, no palpitations  GASTROINTESTINAL: No pain. No nausea or vomiting; No diarrhea   NEUROLOGICAL: No headache or numbness, no tremors  MUSCULOSKELETAL: + some pain in elbow  GENITOURINARY: no dysuria, no frequency, no hesitancy  PSYCHIATRY: no depression , no anxiety  ALL OTHER  ROS negative        Vital Signs Last 24 Hrs  T(C): 37.2 (07 Oct 2022 13:09), Max: 37.4 (06 Oct 2022 20:16)  T(F): 98.9 (07 Oct 2022 13:09), Max: 99.3 (06 Oct 2022 20:16)  HR: 61 (07 Oct 2022 13:09) (61 - 69)  BP: 124/78 (07 Oct 2022 13:09) (124/78 - 145/69)  BP(mean): --  RR: 18 (07 Oct 2022 13:09) (18 - 18)  SpO2: 96% (07 Oct 2022 13:09) (96% - 100%)    Parameters below as of 07 Oct 2022 13:09  Patient On (Oxygen Delivery Method): room air        ________________________________________________  PHYSICAL EXAM:  GENERAL: NAD  HEENT: Normocephalic;  conjunctivae and sclerae clear  NECK : supple  CHEST/LUNG: Clear to auscultation bilaterally   HEART: S1 S2  RRR  ABDOMEN: Soft, Nontender, Nondistended; Bowel sounds present  EXTREMITIES: no cyanosis; no edema; no calf tenderness  SKIN: warm and dry; no rash  NERVOUS SYSTEM:  Awake and alert; Oriented  to place, person and time    _________________________________________________  LABS:              CAPILLARY BLOOD GLUCOSE            RADIOLOGY & ADDITIONAL TESTS:  < from: Xray Humerus, Left (10.02.22 @ 09:18) >  IMPRESSION:    Impacted fracture surgical neck left humerus.    < end of copied text >    < from: Xray Shoulder 2 Views, Left (10.02.22 @ 09:19) >  IMPRESSION:    Impacted fracture surgical neck left humerus.    < end of copied text >      < from: Xray Chest 1 View- PORTABLE-Urgent (Xray Chest 1 View- PORTABLE-Urgent .) (10.02.22 @ 09:20) >  IMPRESSION:    Unremarkable frontal chest x ray    < end of copied text >      < from: Xray Pelvis AP only (10.02.22 @ 09:20) >  IMPRESSION:    No acute fracture appreciated. Cross-sectional imaging if clinically   indicated.    < end of copied text >      < from: CT Cervical Spine No Cont (10.02.22 @ 09:30) >  IMPRESSION:  Head CT: No acute intracranial hemorrhage, vasogenic edema, extra-axial   collection or calvarial fracture. Chronic microvascular changes.    Cervical spine CT: No acute cervical spine fracture or evidence of   traumatic malalignment. Of cervical spondylosis most notable C4/C5.    < end of copied text >      Imaging Personally Reviewed:  YES    Consultant(s) Notes Reviewed:   YES      Plan of care was discussed with patient and /or primary care giver; all questions and concerns were addressed and care was aligned with patient's wishes.

## 2022-10-07 NOTE — PROGRESS NOTE ADULT - ASSESSMENT
A 77 year old female, from home, ambulating w/ assistance, with PMHx dementia presents s/p fall. found to have Impacted fracture surgical neck left humerus. CT head negative. Orthro consulted. Recommended conservative management.  PT recc NIURKA, awaiting auth to [facility].

## 2022-10-08 RX ADMIN — ATORVASTATIN CALCIUM 10 MILLIGRAM(S): 80 TABLET, FILM COATED ORAL at 21:38

## 2022-10-08 RX ADMIN — SENNA PLUS 2 TABLET(S): 8.6 TABLET ORAL at 21:38

## 2022-10-08 RX ADMIN — MEMANTINE HYDROCHLORIDE 10 MILLIGRAM(S): 10 TABLET ORAL at 11:57

## 2022-10-08 RX ADMIN — ENOXAPARIN SODIUM 40 MILLIGRAM(S): 100 INJECTION SUBCUTANEOUS at 17:44

## 2022-10-08 NOTE — DIETITIAN INITIAL EVALUATION ADULT - PERTINENT MEDS FT
MEDICATIONS  (STANDING):  atorvastatin 10 milliGRAM(s) Oral at bedtime  enoxaparin Injectable 40 milliGRAM(s) SubCutaneous every 24 hours  losartan 25 milliGRAM(s) Oral daily  memantine 10 milliGRAM(s) Oral daily  senna 2 Tablet(s) Oral at bedtime    MEDICATIONS  (PRN):  acetaminophen     Tablet .. 650 milliGRAM(s) Oral every 6 hours PRN Temp greater or equal to 38C (100.4F), Mild Pain (1 - 3)  aluminum hydroxide/magnesium hydroxide/simethicone Suspension 30 milliLiter(s) Oral every 4 hours PRN Dyspepsia  melatonin 3 milliGRAM(s) Oral at bedtime PRN Insomnia  ondansetron Injectable 4 milliGRAM(s) IV Push every 8 hours PRN Nausea and/or Vomiting  oxyCODONE    IR 5 milliGRAM(s) Oral every 6 hours PRN Severe Pain (7 - 10)

## 2022-10-08 NOTE — DIETITIAN INITIAL EVALUATION ADULT - OTHER INFO
Patient from home s/p fall admitted for left shoulder facture. Visited pt. alert but weak with "arm sling", states "appetite okay" & dislikes "eggs" at breakfast, reviewed menu selections, obtained food preferences, updated kitchen/Diet Tech, offered nutrition supplements, pt. declined. Per pt. usual po intake "good" at home & denies any weight changes? dosing wt. 122.5 Lbs on 10/05/22, per flow sheets intake 26.50%, seen by Ortho team & awaiting NIURKA placement. Patient from home s/p fall admitted for left shoulder facture. Visited pt. alert but weak with "arm sling", states "appetite okay" & dislikes "eggs" at breakfast, reviewed menu selections, obtained food preferences, updated kitchen/Diet Tech, offered nutrition supplements, pt. declined. Per pt. usual po intake "good" at home & denies any weight changes? dosing wt. 122.5 Lbs on 10/05/22, per flow sheets intake 26.50%, seen by Ortho team, on pain management & awaiting NIURKA placement.

## 2022-10-08 NOTE — DIETITIAN INITIAL EVALUATION ADULT - WEIGHT FOR BMI (KG)
55.6 Propranolol Pregnancy And Lactation Text: This medication is Pregnancy Category C and it isn't known if it is safe during pregnancy. It is excreted in breast milk.

## 2022-10-08 NOTE — DIETITIAN INITIAL EVALUATION ADULT - SIGNS/SYMPTOMS
Inadequate oral intake of 26-50% of meals, ongoing weakness & denies wt. loss? Inadequate oral intake of 26-50% of meals, ongoing weakness, denies wt. loss? & on pain management

## 2022-10-08 NOTE — PROGRESS NOTE ADULT - SUBJECTIVE AND OBJECTIVE BOX
Patient is a 77y old  Female who presents with a chief complaint of Fall (07 Oct 2022 14:40)    pt seen in icu [  ], reg med floor [ x  ], bed [ x ], chair at bedside [   ], awake and responsive [ x ],   lethargic [  ],  nad [ x ]      Allergies    No Known Allergies        Vitals    T(F): 99.4 (10-07-22 @ 20:50), Max: 99.4 (10-07-22 @ 20:50)  HR: 69 (10-07-22 @ 20:50) (61 - 69)  BP: 137/76 (10-07-22 @ 20:50) (124/78 - 137/76)  RR: 17 (10-07-22 @ 20:50) (17 - 18)  SpO2: 96% (10-07-22 @ 20:50) (96% - 96%)  Wt(kg): --  CAPILLARY BLOOD GLUCOSE          Labs                          Radiology Results      Meds    MEDICATIONS  (STANDING):  atorvastatin 10 milliGRAM(s) Oral at bedtime  enoxaparin Injectable 40 milliGRAM(s) SubCutaneous every 24 hours  losartan 25 milliGRAM(s) Oral daily  memantine 10 milliGRAM(s) Oral daily  senna 2 Tablet(s) Oral at bedtime      MEDICATIONS  (PRN):  acetaminophen     Tablet .. 650 milliGRAM(s) Oral every 6 hours PRN Temp greater or equal to 38C (100.4F), Mild Pain (1 - 3)  aluminum hydroxide/magnesium hydroxide/simethicone Suspension 30 milliLiter(s) Oral every 4 hours PRN Dyspepsia  melatonin 3 milliGRAM(s) Oral at bedtime PRN Insomnia  ondansetron Injectable 4 milliGRAM(s) IV Push every 8 hours PRN Nausea and/or Vomiting  oxyCODONE    IR 5 milliGRAM(s) Oral every 6 hours PRN Severe Pain (7 - 10)      Physical Exam    Neuro :  no focal deficits  Respiratory: CTA B/L  CV: RRR, S1S2, no murmurs,   Abdominal: Soft, NT, ND +BS,  Extremities: LUE sling, No edema, + peripheral pulses    ASSESSMENT    left humeral fx,   s/p fall,   h/o dementia  HTN  HLD      PLAN    ortho f/u   -  Daily PT- Non weight bearing of left arm. Keep Left arm in sling at all times  -  Treat fracture conservatively. No surgical intervention at this time.  -  Patient to follow up with Orthopedist dr marroquin within 2 weeks for repeat xray and fracture monitoring  Pain management  CTH negative   cont Tylenol for mild pain   cont Oxycodone for severe pain   dvt prophylaxis   phys tx eval noted and rec phys tx 2-3x/day x 5x/wk for 4-6 weeks with straight cane at [facility]   cont home meds   d/c planning to [facility] pending auth

## 2022-10-09 RX ADMIN — ATORVASTATIN CALCIUM 10 MILLIGRAM(S): 80 TABLET, FILM COATED ORAL at 21:36

## 2022-10-09 RX ADMIN — Medication 3 MILLIGRAM(S): at 21:35

## 2022-10-09 RX ADMIN — SENNA PLUS 2 TABLET(S): 8.6 TABLET ORAL at 21:35

## 2022-10-09 RX ADMIN — ENOXAPARIN SODIUM 40 MILLIGRAM(S): 100 INJECTION SUBCUTANEOUS at 17:33

## 2022-10-09 RX ADMIN — MEMANTINE HYDROCHLORIDE 10 MILLIGRAM(S): 10 TABLET ORAL at 12:06

## 2022-10-09 RX ADMIN — LOSARTAN POTASSIUM 25 MILLIGRAM(S): 100 TABLET, FILM COATED ORAL at 06:01

## 2022-10-09 NOTE — PROGRESS NOTE ADULT - SUBJECTIVE AND OBJECTIVE BOX
Patient is a 77y old  Female who presents with a chief complaint of Unspecified injury of shoulder and upper arm, unspecified arm, initial encounter     (08 Oct 2022 11:28)    pt seen in icu [  ], reg med floor [ x  ], bed [ x ], chair at bedside [   ], awake and responsive [ x ],   lethargic [  ],  nad [ x ]      Allergies    No Known Allergies        Vitals    T(F): 97.1 (10-09-22 @ 05:09), Max: 99 (10-08-22 @ 14:46)  HR: 69 (10-09-22 @ 05:09) (62 - 69)  BP: 136/74 (10-09-22 @ 05:09) (136/74 - 137/75)  RR: 16 (10-09-22 @ 05:09) (16 - 17)  SpO2: 98% (10-09-22 @ 05:09) (98% - 99%)  Wt(kg): --  CAPILLARY BLOOD GLUCOSE          Labs                          Radiology Results      Meds    MEDICATIONS  (STANDING):  atorvastatin 10 milliGRAM(s) Oral at bedtime  enoxaparin Injectable 40 milliGRAM(s) SubCutaneous every 24 hours  losartan 25 milliGRAM(s) Oral daily  memantine 10 milliGRAM(s) Oral daily  senna 2 Tablet(s) Oral at bedtime      MEDICATIONS  (PRN):  acetaminophen     Tablet .. 650 milliGRAM(s) Oral every 6 hours PRN Temp greater or equal to 38C (100.4F), Mild Pain (1 - 3)  aluminum hydroxide/magnesium hydroxide/simethicone Suspension 30 milliLiter(s) Oral every 4 hours PRN Dyspepsia  melatonin 3 milliGRAM(s) Oral at bedtime PRN Insomnia  ondansetron Injectable 4 milliGRAM(s) IV Push every 8 hours PRN Nausea and/or Vomiting  oxyCODONE    IR 5 milliGRAM(s) Oral every 6 hours PRN Severe Pain (7 - 10)      Physical Exam    Neuro :  no focal deficits  Respiratory: CTA B/L  CV: RRR, S1S2, no murmurs,   Abdominal: Soft, NT, ND +BS,  Extremities: LUE sling, No edema, + peripheral pulses    ASSESSMENT    left humeral fx,   s/p fall,   h/o dementia  HTN  HLD      PLAN    ortho f/u   -  Daily PT- Non weight bearing of left arm. Keep Left arm in sling at all times  -  Treat fracture conservatively. No surgical intervention at this time.  -  Patient to follow up with Orthopedist dr marroquin within 2 weeks for repeat xray and fracture monitoring  Pain management  CTH negative   cont Tylenol for mild pain   cont Oxycodone for severe pain   dvt prophylaxis   phys tx eval noted and rec phys tx 2-3x/day x 5x/wk for 4-6 weeks with straight cane at [facility]   cont home meds   [facility]/c planning to [facility] auth

## 2022-10-10 RX ADMIN — Medication 3 MILLIGRAM(S): at 21:25

## 2022-10-10 RX ADMIN — ATORVASTATIN CALCIUM 10 MILLIGRAM(S): 80 TABLET, FILM COATED ORAL at 21:25

## 2022-10-10 RX ADMIN — MEMANTINE HYDROCHLORIDE 10 MILLIGRAM(S): 10 TABLET ORAL at 12:36

## 2022-10-10 RX ADMIN — SENNA PLUS 2 TABLET(S): 8.6 TABLET ORAL at 21:25

## 2022-10-10 RX ADMIN — ENOXAPARIN SODIUM 40 MILLIGRAM(S): 100 INJECTION SUBCUTANEOUS at 16:57

## 2022-10-10 RX ADMIN — LOSARTAN POTASSIUM 25 MILLIGRAM(S): 100 TABLET, FILM COATED ORAL at 06:19

## 2022-10-10 NOTE — PROGRESS NOTE ADULT - SUBJECTIVE AND OBJECTIVE BOX
Patient is a 77y old  Female who presents with a chief complaint of Fall (10 Oct 2022 06:29)      INTERVAL HPI/OVERNIGHT EVENTS: no acute events overnight    REVIEW OF SYSTEMS:  CONSTITUTIONAL: No fever, chills  ENMT:  No difficulty hearing, no change in vision  NECK: No pain or stiffness  RESPIRATORY: No cough, SOB  CARDIOVASCULAR: No chest pain, palpitations  GASTROINTESTINAL: No abdominal pain. No nausea, vomiting, or diarrhea  GENITOURINARY: No dysuria  NEUROLOGICAL: No HA  SKIN: No itching, burning, rashes, or lesions   LYMPH NODES: No enlarged glands  ENDOCRINE: No heat or cold intolerance; No hair loss  MUSCULOSKELETAL: No joint pain or swelling; No muscle, back, or extremity pain  PSYCHIATRIC: No depression, anxiety  HEME/LYMPH: No easy bruising, or bleeding gums    T(C): 37 (10-10-22 @ 12:22), Max: 37 (10-10-22 @ 12:22)  HR: 57 (10-10-22 @ 12:22) (57 - 74)  BP: 115/72 (10-10-22 @ 12:22) (115/72 - 150/73)  RR: 18 (10-10-22 @ 12:22) (17 - 18)  SpO2: 96% (10-10-22 @ 12:22) (96% - 100%)  Wt(kg): --Vital Signs Last 24 Hrs  T(C): 37 (10 Oct 2022 12:22), Max: 37 (10 Oct 2022 12:22)  T(F): 98.6 (10 Oct 2022 12:22), Max: 98.6 (10 Oct 2022 12:22)  HR: 57 (10 Oct 2022 12:22) (57 - 74)  BP: 115/72 (10 Oct 2022 12:22) (115/72 - 150/73)  BP(mean): --  RR: 18 (10 Oct 2022 12:22) (17 - 18)  SpO2: 96% (10 Oct 2022 12:22) (96% - 100%)    Parameters below as of 10 Oct 2022 12:22  Patient On (Oxygen Delivery Method): room air    PHYSICAL EXAM:  GENERAL: NAD  EYES: clear conjunctiva; EOMI  ENMT: Moist mucous membranes  NECK: Supple, No JVD, Normal thyroid  CHEST/LUNG: Clear to auscultation bilaterally; No rales, rhonchi, wheezing, or rubs  HEART: S1, S2, Regular rate and rhythm  ABDOMEN: Soft, Nontender, Nondistended; Bowel sounds present  NEURO: Alert & Oriented X3  EXTREMITIES: No LE edema, no calf tenderness, LUe with sling   LYMPH: No lymphadenopathy noted  SKIN: No rashes or lesions    Consultant(s) Notes Reviewed:  [x ] YES  [ ] NO  Care Discussed with Consultants/Other Providers [ x] YES  [ ] NO    LABS:    CAPILLARY BLOOD GLUCOSE      RADIOLOGY & ADDITIONAL TESTS:    Imaging Personally Reviewed:  [x ] YES  [ ] NO  < from: CT Head No Cont (10.02.22 @ 09:30) >    ACC: [id number] EXAM:  CT CERVICAL SPINE                        ACC: [id number] EXAM:  CT BRAIN                          PROCEDURE DATE:  10/02/2022          INTERPRETATION:  CLINICAL HISTORY: Trauma. Status post fall with head   strike.    TECHNIQUE: A noncontrast head CT and a noncontrast cervical spine CT were   performed. The head CT was performed with contiguous 5 mm transaxial   images from the skull base to vertex. Images were reviewed in brain,   stroke, subdural and bone windows.  The cervical spine CT was performed with transaxial thin section images   from the occiput to the T4 level.  Coronal and sagittal reformatted   images were created from the transaxial source data.  Images were   reviewed in bone and soft tissue windows.    COMPARISON: None available.    FINDINGS:    Head CT:  There is no acute intracranial hemorrhage, vasogenic edema or evidence   for acute large vascular territory infarct. Patchy areas of   low-attenuation in bihemispheric white matter, nonspecific, but likely   the sequela of chronic microvascular change.    The ventricles, sulci and cisternal spaces are mildly diffusely prominent   but in proportion compatible with involutional change.  There is no   midline shift or abnormal extra-axial fluid collection.    The calvarium is intact.  There are no osteoblastic or lytic calvarial or   skull base lesions.  The paranasal sinuses and mastoid air cells are   clear.    Cervical spine CT:  The cervical alignment is intact. There are no acute fractures or   evidence of traumatic malalignment. The cervical vertebral body heights   are maintained. Chronic appearing compression fracture deformity of T4.   Multilevel facet alignment is preserved. The atlanto-dental interval is   within normal limits andthe craniocervical junction is unremarkable.   Generalized osteopenia. No suspicious osteoblastic or lytic lesions.    There is no evidence of prevertebral soft tissue swelling or epidural   hematoma.    Multilevel degenerative changes including intervertebral disc space   narrowing, disc osteophyte complexes and facet arthropathy contribute to   multilevel canal and foraminal stenosis, degree of which is suboptimally   assessed on this modality but appears most notable at C4/C5.    The visualized soft tissues of the neck have an unremarkable unenhanced   appearance. Mild biapical scarring.    IMPRESSION:  Head CT: No acute intracranial hemorrhage, vasogenic edema, extra-axial   collection or calvarial fracture. Chronic microvascular changes.    Cervical spine CT: No acute cervical spine fracture or evidence of   traumatic malalignment. Of cervical spondylosis most notable C4/C5.    --- End of Report ---            STEFAN YA MD; Attending Radiologist  This document has been electronically signed. Oct  2 2022 10:25AM    < end of copied text >  < from: CT Cervical Spine No Cont (10.02.22 @ 09:30) >    ACC: [id number] EXAM:  CT CERVICAL SPINE                        ACC: [id number] EXAM:  CT BRAIN                          PROCEDURE DATE:  10/02/2022          INTERPRETATION:  CLINICAL HISTORY: Trauma. Status post fall with head   strike.    TECHNIQUE: A noncontrast head CT and a noncontrast cervical spine CT were   performed. The head CT was performed with contiguous 5 mm transaxial   images from the skull base to vertex. Images were reviewed in brain,   stroke, subdural and bone windows.  The cervical spine CT was performed with transaxial thin section images   from the occiput to the T4 level.  Coronal and sagittal reformatted   images were created from the transaxial source data.  Images were   reviewed in bone and soft tissue windows.    COMPARISON: None available.    FINDINGS:    Head CT:  There is no acute intracranial hemorrhage, vasogenic edema or evidence   for acute large vascular territory infarct. Patchy areas of   low-attenuation in bihemispheric white matter, nonspecific, but likely   the sequela of chronic microvascular change.    The ventricles, sulci and cisternal spaces are mildly diffusely prominent   but in proportion compatible with involutional change.  There is no   midline shift or abnormal extra-axial fluid collection.    The calvarium is intact.  There are no osteoblastic or lytic calvarial or   skull base lesions.  The paranasal sinuses and mastoid air cells are   clear.    Cervical spine CT:  The cervical alignment is intact. There are no acute fractures or   evidence of traumatic malalignment. The cervical vertebral body heights   are maintained. Chronic appearing compression fracture deformity of T4.   Multilevel facet alignment is preserved. The atlanto-dental interval is   within normal limits andthe craniocervical junction is unremarkable.   Generalized osteopenia. No suspicious osteoblastic or lytic lesions.    There is no evidence of prevertebral soft tissue swelling or epidural   hematoma.    Multilevel degenerative changes including intervertebral disc space   narrowing, disc osteophyte complexes and facet arthropathy contribute to   multilevel canal and foraminal stenosis, degree of which is suboptimally   assessed on this modality but appears most notable at C4/C5.    The visualized soft tissues of the neck have an unremarkable unenhanced   appearance. Mild biapical scarring.    IMPRESSION:  Head CT: No acute intracranial hemorrhage, vasogenic edema, extra-axial   collection or calvarial fracture. Chronic microvascular changes.    Cervical spine CT: No acute cervical spine fracture or evidence of   traumatic malalignment. Of cervical spondylosis most notable C4/C5.    --- End of Report ---            STEFAN YA MD; Attending Radiologist  This document has been electronically signed. Oct  2 2022 10:25AM    < end of copied text >

## 2022-10-10 NOTE — PROGRESS NOTE ADULT - SUBJECTIVE AND OBJECTIVE BOX
Patient is a 77y old  Female who presents with a chief complaint of Fall (09 Oct 2022 06:36)    pt seen in icu [  ], reg med floor [ x  ], bed [ x ], chair at bedside [   ], awake and responsive [ x ],   lethargic [  ],  nad [ x ]      Allergies    No Known Allergies        Vitals    T(F): 98 (10-10-22 @ 05:10), Max: 98.2 (10-09-22 @ 14:31)  HR: 57 (10-10-22 @ 05:10) (57 - 74)  BP: 150/73 (10-10-22 @ 05:10) (137/67 - 150/73)  RR: 18 (10-10-22 @ 05:10) (16 - 18)  SpO2: 97% (10-10-22 @ 05:10) (97% - 100%)  Wt(kg): --  CAPILLARY BLOOD GLUCOSE          Labs                          Radiology Results      Meds    MEDICATIONS  (STANDING):  atorvastatin 10 milliGRAM(s) Oral at bedtime  enoxaparin Injectable 40 milliGRAM(s) SubCutaneous every 24 hours  losartan 25 milliGRAM(s) Oral daily  memantine 10 milliGRAM(s) Oral daily  senna 2 Tablet(s) Oral at bedtime      MEDICATIONS  (PRN):  acetaminophen     Tablet .. 650 milliGRAM(s) Oral every 6 hours PRN Temp greater or equal to 38C (100.4F), Mild Pain (1 - 3)  aluminum hydroxide/magnesium hydroxide/simethicone Suspension 30 milliLiter(s) Oral every 4 hours PRN Dyspepsia  melatonin 3 milliGRAM(s) Oral at bedtime PRN Insomnia  ondansetron Injectable 4 milliGRAM(s) IV Push every 8 hours PRN Nausea and/or Vomiting      Physical Exam    Neuro :  no focal deficits  Respiratory: CTA B/L  CV: RRR, S1S2, no murmurs,   Abdominal: Soft, NT, ND +BS,  Extremities: LUE sling, No edema, + peripheral pulses    ASSESSMENT    left humeral fx,   s/p fall,   h/o dementia  HTN  HLD      PLAN    ortho f/u   -  Daily PT- Non weight bearing of left arm. Keep Left arm in sling at all times  -  Treat fracture conservatively. No surgical intervention at this time.  -  Patient to follow up with Orthopedist dr marroquin within 2 weeks for repeat xray and fracture monitoring  Pain management  CTH negative   cont Tylenol for mild pain   cont Oxycodone for severe pain   dvt prophylaxis   phys tx eval noted and rec phys tx 2-3x/day x 5x/wk for 4-6 weeks with straight cane at [facility]   cont home meds   d/c planning to [facility] auth

## 2022-10-10 NOTE — PROGRESS NOTE ADULT - ASSESSMENT
A 77 year old female, from home, ambulating w/ assistance, with PMHx dementia presents s/p fall. found to have Impacted fracture surgical neck left humerus. CT head negative. Orthro consulted. Recommended conservative management.  PT recc NIURKA, awaiting auth to [facility].

## 2022-10-11 RX ADMIN — LOSARTAN POTASSIUM 25 MILLIGRAM(S): 100 TABLET, FILM COATED ORAL at 05:37

## 2022-10-11 RX ADMIN — ATORVASTATIN CALCIUM 10 MILLIGRAM(S): 80 TABLET, FILM COATED ORAL at 21:57

## 2022-10-11 RX ADMIN — ENOXAPARIN SODIUM 40 MILLIGRAM(S): 100 INJECTION SUBCUTANEOUS at 16:18

## 2022-10-11 RX ADMIN — Medication 650 MILLIGRAM(S): at 08:00

## 2022-10-11 RX ADMIN — Medication 650 MILLIGRAM(S): at 08:30

## 2022-10-11 RX ADMIN — SENNA PLUS 2 TABLET(S): 8.6 TABLET ORAL at 21:57

## 2022-10-11 RX ADMIN — MEMANTINE HYDROCHLORIDE 10 MILLIGRAM(S): 10 TABLET ORAL at 11:25

## 2022-10-11 NOTE — PROGRESS NOTE ADULT - ASSESSMENT
A 77 year old female, from home, ambulating w/ assistance, with PMHx dementia presents s/p fall. found to have Impacted fracture surgical neck left humerus. CT head negative. Orthro consulted. Recommended conservative management.  PT recc NIURKA, awaiting auth to [facility].

## 2022-10-11 NOTE — PROGRESS NOTE ADULT - SUBJECTIVE AND OBJECTIVE BOX
Patient is a 77y old  Female who presents with a chief complaint of Fall (11 Oct 2022 08:35)    INTERVAL HPI/OVERNIGHT EVENTS: no acute events overnight    REVIEW OF SYSTEMS:  CONSTITUTIONAL: No fever, chills  ENMT:  No difficulty hearing, no change in vision  NECK: No pain or stiffness  RESPIRATORY: No cough, SOB  CARDIOVASCULAR: No chest pain, palpitations  GASTROINTESTINAL: No abdominal pain. No nausea, vomiting, or diarrhea  GENITOURINARY: No dysuria  NEUROLOGICAL: No HA  SKIN: No itching, burning, rashes, or lesions   LYMPH NODES: No enlarged glands  ENDOCRINE: No heat or cold intolerance; No hair loss  MUSCULOSKELETAL: No joint pain or swelling; No muscle, back, or extremity pain  PSYCHIATRIC: No depression, anxiety  HEME/LYMPH: No easy bruising, or bleeding gums    T(C): 36.6 (10-11-22 @ 05:22), Max: 37 (10-10-22 @ 12:22)  HR: 58 (10-11-22 @ 05:22) (57 - 64)  BP: 115/69 (10-11-22 @ 05:22) (115/69 - 133/78)  RR: 16 (10-11-22 @ 05:22) (16 - 18)  SpO2: 95% (10-11-22 @ 05:22) (95% - 97%)  Wt(kg): --Vital Signs Last 24 Hrs  T(C): 36.6 (11 Oct 2022 05:22), Max: 37 (10 Oct 2022 12:22)  T(F): 97.8 (11 Oct 2022 05:22), Max: 98.6 (10 Oct 2022 12:22)  HR: 58 (11 Oct 2022 05:22) (57 - 64)  BP: 115/69 (11 Oct 2022 05:22) (115/69 - 133/78)  BP(mean): --  RR: 16 (11 Oct 2022 05:22) (16 - 18)  SpO2: 95% (11 Oct 2022 05:22) (95% - 97%)    Parameters below as of 11 Oct 2022 05:22  Patient On (Oxygen Delivery Method): room air    MEDICATIONS  (STANDING):  atorvastatin 10 milliGRAM(s) Oral at bedtime  enoxaparin Injectable 40 milliGRAM(s) SubCutaneous every 24 hours  losartan 25 milliGRAM(s) Oral daily  memantine 10 milliGRAM(s) Oral daily  senna 2 Tablet(s) Oral at bedtime    MEDICATIONS  (PRN):  acetaminophen     Tablet .. 650 milliGRAM(s) Oral every 6 hours PRN Temp greater or equal to 38C (100.4F), Mild Pain (1 - 3)  aluminum hydroxide/magnesium hydroxide/simethicone Suspension 30 milliLiter(s) Oral every 4 hours PRN Dyspepsia  melatonin 3 milliGRAM(s) Oral at bedtime PRN Insomnia  ondansetron Injectable 4 milliGRAM(s) IV Push every 8 hours PRN Nausea and/or Vomiting    PHYSICAL EXAM:  GENERAL: NAD  EYES: clear conjunctiva; EOMI  ENMT: Moist mucous membranes  NECK: Supple, No JVD, Normal thyroid  CHEST/LUNG: Clear to auscultation bilaterally; No rales, rhonchi, wheezing, or rubs  HEART: S1, S2, Regular rate and rhythm  ABDOMEN: Soft, Nontender, Nondistended; Bowel sounds present  NEURO: Alert & awake  EXTREMITIES: No LE edema, no calf tenderness, L arm with sling CDi  LYMPH: No lymphadenopathy noted  SKIN: No rashes or lesions    Consultant(s) Notes Reviewed:  [x ] YES  [ ] NO  Care Discussed with Consultants/Other Providers [ x] YES  [ ] NO    LABS:      CAPILLARY BLOOD GLUCOSE    RADIOLOGY & ADDITIONAL TESTS:    Imaging Personally Reviewed:  [x] YES  [ ] NO  < from: CT Head No Cont (10.02.22 @ 09:30) >  ACC: [id number] EXAM:  CT CERVICAL SPINE                        ACC: [id number] EXAM:  CT BRAIN                          PROCEDURE DATE:  10/02/2022          INTERPRETATION:  CLINICAL HISTORY: Trauma. Status post fall with head   strike.    TECHNIQUE: A noncontrast head CT and a noncontrast cervical spine CT were   performed. The head CT was performed with contiguous 5 mm transaxial   images from the skull base to vertex. Images were reviewed in brain,   stroke, subdural and bone windows.  The cervical spine CT was performed with transaxial thin section images   from the occiput to the T4 level.  Coronal and sagittal reformatted   images were created from the transaxial source data.  Images were   reviewed in bone and soft tissue windows.    COMPARISON: None available.    FINDINGS:    Head CT:  There is no acute intracranial hemorrhage, vasogenic edema or evidence   for acute large vascular territory infarct. Patchy areas of   low-attenuation in bihemispheric white matter, nonspecific, but likely   the sequela of chronic microvascular change.    The ventricles, sulci and cisternal spaces are mildly diffusely prominent   but in proportion compatible with involutional change.  There is no   midline shift or abnormal extra-axial fluid collection.    The calvarium is intact.  There are no osteoblastic or lytic calvarial or   skull base lesions.  The paranasal sinuses and mastoid air cells are   clear.    Cervical spine CT:  The cervical alignment is intact. There are no acute fractures or   evidence of traumatic malalignment. The cervical vertebral body heights   are maintained. Chronic appearing compression fracture deformity of T4.   Multilevel facet alignment is preserved. The atlanto-dental interval is   within normal limits andthe craniocervical junction is unremarkable.   Generalized osteopenia. No suspicious osteoblastic or lytic lesions.    There is no evidence of prevertebral soft tissue swelling or epidural   hematoma.    Multilevel degenerative changes including intervertebral disc space   narrowing, disc osteophyte complexes and facet arthropathy contribute to   multilevel canal and foraminal stenosis, degree of which is suboptimally   assessed on this modality but appears most notable at C4/C5.    The visualized soft tissues of the neck have an unremarkable unenhanced   appearance. Mild biapical scarring.    IMPRESSION:  Head CT: No acute intracranial hemorrhage, vasogenic edema, extra-axial   collection or calvarial fracture. Chronic microvascular changes.    Cervical spine CT: No acute cervical spine fracture or evidence of   traumatic malalignment. Of cervical spondylosis most notable C4/C5.    --- End of Report ---            STEFAN YA MD; Attending Radiologist  This document has been electronically signed. Oct  2 2022 10:25AM    < end of copied text >  < from: CT Cervical Spine No Cont (10.02.22 @ 09:30) >    ACC: [id number] EXAM:  CT CERVICAL SPINE                        ACC: [id number] EXAM:  CT BRAIN                          PROCEDURE DATE:  10/02/2022          INTERPRETATION:  CLINICAL HISTORY: Trauma. Status post fall with head   strike.    TECHNIQUE: A noncontrast head CT and a noncontrast cervical spine CT were   performed. The head CT was performed with contiguous 5 mm transaxial   images from the skull base to vertex. Images were reviewed in brain,   stroke, subdural and bone windows.  The cervical spine CT was performed with transaxial thin section images   from the occiput to the T4 level.  Coronal and sagittal reformatted   images were created from the transaxial source data.  Images were   reviewed in bone and soft tissue windows.    COMPARISON: None available.    FINDINGS:    Head CT:  There is no acute intracranial hemorrhage, vasogenic edema or evidence   for acute large vascular territory infarct. Patchy areas of   low-attenuation in bihemispheric white matter, nonspecific, but likely   the sequela of chronic microvascular change.    The ventricles, sulci and cisternal spaces are mildly diffusely prominent   but in proportion compatible with involutional change.  There is no   midline shift or abnormal extra-axial fluid collection.    The calvarium is intact.  There are no osteoblastic or lytic calvarial or   skull base lesions.  The paranasal sinuses and mastoid air cells are   clear.    Cervical spine CT:  The cervical alignment is intact. There are no acute fractures or   evidence of traumatic malalignment. The cervical vertebral body heights   are maintained. Chronic appearing compression fracture deformity of T4.   Multilevel facet alignment is preserved. The atlanto-dental interval is   within normal limits andthe craniocervical junction is unremarkable.   Generalized osteopenia. No suspicious osteoblastic or lytic lesions.    There is no evidence of prevertebral soft tissue swelling or epidural   hematoma.    Multilevel degenerative changes including intervertebral disc space   narrowing, disc osteophyte complexes and facet arthropathy contribute to   multilevel canal and foraminal stenosis, degree of which is suboptimally   assessed on this modality but appears most notable at C4/C5.    The visualized soft tissues of the neck have an unremarkable unenhanced   appearance. Mild biapical scarring.    IMPRESSION:  Head CT: No acute intracranial hemorrhage, vasogenic edema, extra-axial   collection or calvarial fracture. Chronic microvascular changes.    Cervical spine CT: No acute cervical spine fracture or evidence of   traumatic malalignment. Of cervical spondylosis most notable C4/C5.    --- End of Report ---            STEFAN YA MD; Attending Radiologist  This document has been electronically signed. Oct  2 2022 10:25AM    < end of copied text >

## 2022-10-11 NOTE — PROGRESS NOTE ADULT - SUBJECTIVE AND OBJECTIVE BOX
`Patient is a 77y old  Female who presents with a chief complaint of Fall (10 Oct 2022 16:39)    pt seen in icu [  ], reg med floor [ x  ], bed [ x ], chair at bedside [   ], awake and responsive [ x ],   lethargic [  ],  nad [ x ]      Allergies    No Known Allergies        Vitals    T(F): 97.8 (10-11-22 @ 05:22), Max: 98.6 (10-10-22 @ 12:22)  HR: 58 (10-11-22 @ 05:22) (57 - 64)  BP: 115/69 (10-11-22 @ 05:22) (115/69 - 133/78)  RR: 16 (10-11-22 @ 05:22) (16 - 18)  SpO2: 95% (10-11-22 @ 05:22) (95% - 97%)  Wt(kg): --  CAPILLARY BLOOD GLUCOSE          Labs                          Radiology Results      Meds    MEDICATIONS  (STANDING):  atorvastatin 10 milliGRAM(s) Oral at bedtime  enoxaparin Injectable 40 milliGRAM(s) SubCutaneous every 24 hours  losartan 25 milliGRAM(s) Oral daily  memantine 10 milliGRAM(s) Oral daily  senna 2 Tablet(s) Oral at bedtime      MEDICATIONS  (PRN):  acetaminophen     Tablet .. 650 milliGRAM(s) Oral every 6 hours PRN Temp greater or equal to 38C (100.4F), Mild Pain (1 - 3)  aluminum hydroxide/magnesium hydroxide/simethicone Suspension 30 milliLiter(s) Oral every 4 hours PRN Dyspepsia  melatonin 3 milliGRAM(s) Oral at bedtime PRN Insomnia  ondansetron Injectable 4 milliGRAM(s) IV Push every 8 hours PRN Nausea and/or Vomiting      Physical Exam    Neuro :  no focal deficits  Respiratory: CTA B/L  CV: RRR, S1S2, no murmurs,   Abdominal: Soft, NT, ND +BS,  Extremities: LUE sling, No edema, + peripheral pulses    ASSESSMENT    left humeral fx,   s/p fall,   h/o dementia  HTN  HLD      PLAN    ortho f/u   -  Daily PT- Non weight bearing of left arm. Keep Left arm in sling at all times  -  Treat fracture conservatively. No surgical intervention at this time.  -  Patient to follow up with Orthopedist dr marroquin within 2 weeks for repeat xray and fracture monitoring  Pain management  CTH negative   cont Tylenol for mild pain   cont Oxycodone for severe pain   dvt prophylaxis   phys tx eval noted and rec phys tx 2-3x/day x 5x/wk for 4-6 weeks with straight cane [facility]   cont home meds   [facility]

## 2022-10-12 LAB — SARS-COV-2 RNA SPEC QL NAA+PROBE: SIGNIFICANT CHANGE UP

## 2022-10-12 RX ADMIN — ATORVASTATIN CALCIUM 10 MILLIGRAM(S): 80 TABLET, FILM COATED ORAL at 21:36

## 2022-10-12 RX ADMIN — MEMANTINE HYDROCHLORIDE 10 MILLIGRAM(S): 10 TABLET ORAL at 11:37

## 2022-10-12 RX ADMIN — ENOXAPARIN SODIUM 40 MILLIGRAM(S): 100 INJECTION SUBCUTANEOUS at 16:44

## 2022-10-12 RX ADMIN — Medication 3 MILLIGRAM(S): at 21:36

## 2022-10-12 RX ADMIN — LOSARTAN POTASSIUM 25 MILLIGRAM(S): 100 TABLET, FILM COATED ORAL at 05:47

## 2022-10-12 RX ADMIN — Medication 650 MILLIGRAM(S): at 21:36

## 2022-10-12 RX ADMIN — Medication 650 MILLIGRAM(S): at 11:40

## 2022-10-12 RX ADMIN — Medication 650 MILLIGRAM(S): at 12:10

## 2022-10-12 RX ADMIN — SENNA PLUS 2 TABLET(S): 8.6 TABLET ORAL at 21:34

## 2022-10-12 RX ADMIN — Medication 650 MILLIGRAM(S): at 22:34

## 2022-10-12 NOTE — PROGRESS NOTE ADULT - SUBJECTIVE AND OBJECTIVE BOX
Patient is a 77y old  Female who presents with a chief complaint of Fall (12 Oct 2022 08:12)      INTERVAL HPI/OVERNIGHT EVENTS: no acute events overnight    REVIEW OF SYSTEMS:  CONSTITUTIONAL: No fever, chills  ENMT:  No difficulty hearing, no change in vision  NECK: No pain or stiffness  RESPIRATORY: No cough, SOB  CARDIOVASCULAR: No chest pain, palpitations  GASTROINTESTINAL: No abdominal pain. No nausea, vomiting, or diarrhea  GENITOURINARY: No dysuria  NEUROLOGICAL: No HA  SKIN: No itching, burning, rashes, or lesions   LYMPH NODES: No enlarged glands  ENDOCRINE: No heat or cold intolerance; No hair loss  MUSCULOSKELETAL: No joint pain or swelling; No muscle, back, or extremity pain  PSYCHIATRIC: No depression, anxiety  HEME/LYMPH: No easy bruising, or bleeding gums    T(C): 36.4 (10-12-22 @ 14:06), Max: 36.9 (10-11-22 @ 20:12)  HR: 81 (10-12-22 @ 14:06) (55 - 81)  BP: 106/66 (10-12-22 @ 14:06) (106/66 - 144/77)  RR: 17 (10-12-22 @ 14:06) (17 - 18)  SpO2: 98% (10-12-22 @ 14:06) (97% - 99%)  Wt(kg): --Vital Signs Last 24 Hrs  T(C): 36.4 (12 Oct 2022 14:06), Max: 36.9 (11 Oct 2022 20:12)  T(F): 97.5 (12 Oct 2022 14:06), Max: 98.4 (11 Oct 2022 20:12)  HR: 81 (12 Oct 2022 14:06) (55 - 81)  BP: 106/66 (12 Oct 2022 14:06) (106/66 - 144/77)  BP(mean): 83 (11 Oct 2022 20:12) (83 - 83)  RR: 17 (12 Oct 2022 14:06) (17 - 18)  SpO2: 98% (12 Oct 2022 14:06) (97% - 99%)    Parameters below as of 12 Oct 2022 14:06  Patient On (Oxygen Delivery Method): room air    MEDICATIONS  (STANDING):  atorvastatin 10 milliGRAM(s) Oral at bedtime  enoxaparin Injectable 40 milliGRAM(s) SubCutaneous every 24 hours  losartan 25 milliGRAM(s) Oral daily  memantine 10 milliGRAM(s) Oral daily  senna 2 Tablet(s) Oral at bedtime    MEDICATIONS  (PRN):  acetaminophen     Tablet .. 650 milliGRAM(s) Oral every 6 hours PRN Temp greater or equal to 38C (100.4F), Mild Pain (1 - 3)  aluminum hydroxide/magnesium hydroxide/simethicone Suspension 30 milliLiter(s) Oral every 4 hours PRN Dyspepsia  melatonin 3 milliGRAM(s) Oral at bedtime PRN Insomnia  ondansetron Injectable 4 milliGRAM(s) IV Push every 8 hours PRN Nausea and/or Vomiting      PHYSICAL EXAM:  GENERAL: NAD  EYES: clear conjunctiva; EOMI  ENMT: Moist mucous membranes  NECK: Supple, No JVD, Normal thyroid  CHEST/LUNG: Clear to auscultation bilaterally; No rales, rhonchi, wheezing, or rubs  HEART: S1, S2, Regular rate and rhythm  ABDOMEN: Soft, Nontender, Nondistended; Bowel sounds present  NEURO: Alert & awake  EXTREMITIES: No LE edema, no calf tenderness, L arm with sling  LYMPH: No lymphadenopathy noted  SKIN: No rashes or lesions    Consultant(s) Notes Reviewed:  [x ] YES  [ ] NO  Care Discussed with Consultants/Other Providers [ x] YES  [ ] NO    LABS:      CAPILLARY BLOOD GLUCOSE      RADIOLOGY & ADDITIONAL TESTS:    Imaging Personally Reviewed:  [x ] YES  [ ] NO    < from: CT Head No Cont (10.02.22 @ 09:30) >  ACC: [id number] EXAM:  CT CERVICAL SPINE                        ACC: [id number] EXAM:  CT BRAIN                          PROCEDURE DATE:  10/02/2022          INTERPRETATION:  CLINICAL HISTORY: Trauma. Status post fall with head   strike.    TECHNIQUE: A noncontrast head CT and a noncontrast cervical spine CT were   performed. The head CT was performed with contiguous 5 mm transaxial   images from the skull base to vertex. Images were reviewed in brain,   stroke, subdural and bone windows.  The cervical spine CT was performed with transaxial thin section images   from the occiput to the T4 level.  Coronal and sagittal reformatted   images were created from the transaxial source data.  Images were   reviewed in bone and soft tissue windows.    COMPARISON: None available.    FINDINGS:    Head CT:  There is no acute intracranial hemorrhage, vasogenic edema or evidence   for acute large vascular territory infarct. Patchy areas of   low-attenuation in bihemispheric white matter, nonspecific, but likely   the sequela of chronic microvascular change.    The ventricles, sulci and cisternal spaces are mildly diffusely prominent   but in proportion compatible with involutional change.  There is no   midline shift or abnormal extra-axial fluid collection.    The calvarium is intact.  There are no osteoblastic or lytic calvarial or   skull base lesions.  The paranasal sinuses and mastoid air cells are   clear.    Cervical spine CT:  The cervical alignment is intact. There are no acute fractures or   evidence of traumatic malalignment. The cervical vertebral body heights   are maintained. Chronic appearing compression fracture deformity of T4.   Multilevel facet alignment is preserved. The atlanto-dental interval is   within normal limits andthe craniocervical junction is unremarkable.   Generalized osteopenia. No suspicious osteoblastic or lytic lesions.    There is no evidence of prevertebral soft tissue swelling or epidural   hematoma.    Multilevel degenerative changes including intervertebral disc space   narrowing, disc osteophyte complexes and facet arthropathy contribute to   multilevel canal and foraminal stenosis, degree of which is suboptimally   assessed on this modality but appears most notable at C4/C5.    The visualized soft tissues of the neck have an unremarkable unenhanced   appearance. Mild biapical scarring.    IMPRESSION:  Head CT: No acute intracranial hemorrhage, vasogenic edema, extra-axial   collection or calvarial fracture. Chronic microvascular changes.    Cervical spine CT: No acute cervical spine fracture or evidence of   traumatic malalignment. Of cervical spondylosis most notable C4/C5.    --- End of Report ---            STEFAN YA MD; Attending Radiologist  This document has been electronically signed. Oct  2 2022 10:25AM    < end of copied text >

## 2022-10-12 NOTE — PROGRESS NOTE ADULT - ASSESSMENT
A 77 year old female, from home, ambulating w/ assistance, with PMHx dementia presents s/p fall. found to have Impacted fracture surgical neck left humerus. CT head negative. Orthro consulted. Recommended conservative management.  PT recc NIURKA, awaiting auth to [facility].

## 2022-10-12 NOTE — PROGRESS NOTE ADULT - PROBLEM SELECTOR PROBLEM 1
Humerus fracture

## 2022-10-12 NOTE — PROGRESS NOTE ADULT - PROBLEM SELECTOR PLAN 1
-X ray showed impacted fracture surgical neck left humerus  -Currently on a sling - pain with movement  -CTH negative  -Ortho consulted - conservative management   -Patient to follow up with Orthopedist within 2 weeks for repeat xray and fracture monitoring  -Cont Tylenol for mild pain   -Cont Oxycodone for severe pain
-X ray showed impacted fracture surgical neck left humerus  -Currently on a sling - pain with movement  -CTH negative  -Ortho consulted - conservative management   -Patient to follow up with Orthopedist within 2 weeks for repeat xray and fracture monitoring  -Cont Tylenol for mild pain   -Cont Oxycodone for severe pain
-X ray showed impacted fracture surgical neck left humerus  -Currently on a sling - pain with movement  -CTH negative  -Consulted - conservative management   -Cont Tylenol for mild pain   -Cont Oxycodone for severe pain  -Orthro following
-X ray showed impacted fracture surgical neck left humerus  -Currently on a sling - pain with movement  -CTH negative  -Ortho consulted - conservative management   -Patient to follow up with Orthopedist within 2 weeks for repeat xray and fracture monitoring  -Cont Tylenol for mild pain   -Cont Oxycodone for severe pain
-X ray showed impacted fracture surgical neck left humerus  -Currently on a sling - pain with movement  -CTH negative  -Ortho consulted - conservative management   -Patient to follow up with Orthopedist within 2 weeks for repeat xray and fracture monitoring  -Cont Tylenol for mild pain   -Cont Oxycodone for severe pain
X ray showed impacted fracture surgical neck left humerus  Currently on a sling   CTH negative  Orth consulted - conservative management   Cont Tylenol for mild pain   cont Oxycodone for severe pain  orthro following
X ray showed impacted fracture surgical neck left humerus  Currently on a sling   CTH negative  Orth consulted - conservative management   Cont Tylenol for mild pain   cont Oxycodone for severe pain  orthro following
-X ray showed impacted fracture surgical neck left humerus  -Currently on a sling - pain with movement  -CTH negative  -Ortho consulted - conservative management   -Patient to follow up with Orthopedist within 2 weeks for repeat xray and fracture monitoring  -Cont Tylenol for mild pain   -Cont Oxycodone for severe pain

## 2022-10-12 NOTE — PROGRESS NOTE ADULT - SUBJECTIVE AND OBJECTIVE BOX
`Patient is a 77y old  Female who presents with a chief complaint of Fall (11 Oct 2022 09:41)    pt seen in icu [  ], reg med floor [ x  ], bed [ x ], chair at bedside [   ], awake and responsive [ x ],   lethargic [  ],  nad [ x ]      Allergies    No Known Allergies        Vitals    T(F): 98 (10-12-22 @ 05:06), Max: 98.4 (10-11-22 @ 12:33)  HR: 55 (10-12-22 @ 05:06) (55 - 67)  BP: 144/77 (10-12-22 @ 05:06) (104/70 - 144/77)  RR: 18 (10-12-22 @ 05:06) (18 - 18)  SpO2: 97% (10-12-22 @ 05:06) (97% - 99%)  Wt(kg): --  CAPILLARY BLOOD GLUCOSE          Labs                          Radiology Results      Meds    MEDICATIONS  (STANDING):  atorvastatin 10 milliGRAM(s) Oral at bedtime  enoxaparin Injectable 40 milliGRAM(s) SubCutaneous every 24 hours  losartan 25 milliGRAM(s) Oral daily  memantine 10 milliGRAM(s) Oral daily  senna 2 Tablet(s) Oral at bedtime      MEDICATIONS  (PRN):  acetaminophen     Tablet .. 650 milliGRAM(s) Oral every 6 hours PRN Temp greater or equal to 38C (100.4F), Mild Pain (1 - 3)  aluminum hydroxide/magnesium hydroxide/simethicone Suspension 30 milliLiter(s) Oral every 4 hours PRN Dyspepsia  melatonin 3 milliGRAM(s) Oral at bedtime PRN Insomnia  ondansetron Injectable 4 milliGRAM(s) IV Push every 8 hours PRN Nausea and/or Vomiting      Physical Exam    Neuro :  no focal deficits  Respiratory: CTA B/L  CV: RRR, S1S2, no murmurs,   Abdominal: Soft, NT, ND +BS,  Extremities: LUE sling, No edema, + peripheral pulses    ASSESSMENT    left humeral fx,   s/p fall,   h/o dementia  HTN  HLD      PLAN    ortho f/u   -  Daily PT- Non weight bearing of left arm. Keep Left arm in sling at all times  -  Treat fracture conservatively. No surgical intervention at this time.  -  Patient to follow up with Orthopedist dr marroquin within 2 weeks for repeat xray and fracture monitoring  Pain management  CTH negative   cont Tylenol for mild pain   cont Oxycodone for severe pain   dvt prophylaxis   phys tx eval noted and rec phys tx 2-3x/day x 5x/wk for 4-6 weeks with straight cane at [facility]   cont home meds   [facility]

## 2022-10-12 NOTE — PROGRESS NOTE ADULT - PROBLEM SELECTOR PLAN 4
- PT reccs NIURKA pending insur auth to [facility]
- PT reccs [facility]URKA pending insur auth to [facility],   [place] requesting reeval from PT

## 2022-10-13 VITALS
DIASTOLIC BLOOD PRESSURE: 86 MMHG | SYSTOLIC BLOOD PRESSURE: 149 MMHG | RESPIRATION RATE: 18 BRPM | TEMPERATURE: 99 F | HEART RATE: 62 BPM | OXYGEN SATURATION: 100 %

## 2022-10-13 RX ADMIN — LOSARTAN POTASSIUM 25 MILLIGRAM(S): 100 TABLET, FILM COATED ORAL at 05:52

## 2022-10-13 NOTE — PROGRESS NOTE ADULT - SUBJECTIVE AND OBJECTIVE BOX
Patient is a 77y old  Female who presents with a chief complaint of Fall (12 Oct 2022 14:36)    pt seen in icu [  ], reg med floor [ x  ], bed [ x ], chair at bedside [   ], awake and responsive [ x ],   lethargic [  ],  nad [ x ]      Allergies    No Known Allergies        Vitals    T(F): 97.5 (10-13-22 @ 04:35), Max: 98.6 (10-12-22 @ 20:12)  HR: 51 (10-13-22 @ 04:35) (51 - 81)  BP: 127/60 (10-13-22 @ 04:35) (106/66 - 140/67)  RR: 18 (10-13-22 @ 04:35) (17 - 18)  SpO2: 98% (10-13-22 @ 04:35) (97% - 98%)  Wt(kg): --  CAPILLARY BLOOD GLUCOSE          Labs            Radiology Results      Meds    MEDICATIONS  (STANDING):  atorvastatin 10 milliGRAM(s) Oral at bedtime  enoxaparin Injectable 40 milliGRAM(s) SubCutaneous every 24 hours  losartan 25 milliGRAM(s) Oral daily  memantine 10 milliGRAM(s) Oral daily  senna 2 Tablet(s) Oral at bedtime      MEDICATIONS  (PRN):  acetaminophen     Tablet .. 650 milliGRAM(s) Oral every 6 hours PRN Temp greater or equal to 38C (100.4F), Mild Pain (1 - 3)  aluminum hydroxide/magnesium hydroxide/simethicone Suspension 30 milliLiter(s) Oral every 4 hours PRN Dyspepsia  melatonin 3 milliGRAM(s) Oral at bedtime PRN Insomnia  ondansetron Injectable 4 milliGRAM(s) IV Push every 8 hours PRN Nausea and/or Vomiting      Physical Exam    Neuro :  no focal deficits  Respiratory: CTA B/L  CV: RRR, S1S2, no murmurs,   Abdominal: Soft, NT, ND +BS,  Extremities: LUE sling, No edema, + peripheral pulses    ASSESSMENT    left humeral fx,   s/p fall,   h/o dementia  HTN  HLD      PLAN    ortho f/u   -  Daily PT- Non weight bearing of left arm. Keep Left arm in sling at all times  -  Treat fracture conservatively. No surgical intervention at this time.  -  Patient to follow up with Orthopedist dr marroquin within 2 weeks for repeat xray and fracture monitoring  Pain management  CTH negative   cont Tylenol for mild pain   cont Oxycodone for severe pain   dvt prophylaxis   phys tx eval noted and rec phys tx 2-3x/day x 5x/wk for 4-6 weeks with straight cane at [facility]   cont home meds   [facility]

## 2022-10-13 NOTE — DISCHARGE NOTE NURSING/CASE MANAGEMENT/SOCIAL WORK - NSDCPEFALRISK_GEN_ALL_CORE
For information on Fall & Injury Prevention, visit: [email or web address] OR  [email or web address] OR  https://www.cdc.gov/steadi/patient.html

## 2022-10-13 NOTE — DISCHARGE NOTE NURSING/CASE MANAGEMENT/SOCIAL WORK - PATIENT PORTAL LINK FT
You can access the FollowMyHealth Patient Portal offered by [facility] by registering at the following website: [email or web address]. By joining [facility]’s FollowMyHealth portal, you will also be able to view your health information using other applications (apps) compatible with our system.

## 2022-10-20 PROCEDURE — 80053 COMPREHEN METABOLIC PANEL: CPT

## 2022-10-20 PROCEDURE — 72170 X-RAY EXAM OF PELVIS: CPT

## 2022-10-20 PROCEDURE — 86901 BLOOD TYPING SEROLOGIC RH(D): CPT

## 2022-10-20 PROCEDURE — 73030 X-RAY EXAM OF SHOULDER: CPT

## 2022-10-20 PROCEDURE — 80061 LIPID PANEL: CPT

## 2022-10-20 PROCEDURE — 87635 SARS-COV-2 COVID-19 AMP PRB: CPT

## 2022-10-20 PROCEDURE — 85610 PROTHROMBIN TIME: CPT

## 2022-10-20 PROCEDURE — 86850 RBC ANTIBODY SCREEN: CPT

## 2022-10-20 PROCEDURE — U0003: CPT

## 2022-10-20 PROCEDURE — 83036 HEMOGLOBIN GLYCOSYLATED A1C: CPT

## 2022-10-20 PROCEDURE — 99285 EMERGENCY DEPT VISIT HI MDM: CPT | Mod: 25

## 2022-10-20 PROCEDURE — 80048 BASIC METABOLIC PNL TOTAL CA: CPT

## 2022-10-20 PROCEDURE — 85027 COMPLETE CBC AUTOMATED: CPT

## 2022-10-20 PROCEDURE — 70450 CT HEAD/BRAIN W/O DYE: CPT | Mod: MA

## 2022-10-20 PROCEDURE — 97162 PT EVAL MOD COMPLEX 30 MIN: CPT

## 2022-10-20 PROCEDURE — 86900 BLOOD TYPING SEROLOGIC ABO: CPT

## 2022-10-20 PROCEDURE — 85730 THROMBOPLASTIN TIME PARTIAL: CPT

## 2022-10-20 PROCEDURE — 73060 X-RAY EXAM OF HUMERUS: CPT

## 2022-10-20 PROCEDURE — 72125 CT NECK SPINE W/O DYE: CPT | Mod: MA

## 2022-10-20 PROCEDURE — 85025 COMPLETE CBC W/AUTO DIFF WBC: CPT

## 2022-10-20 PROCEDURE — U0005: CPT

## 2022-10-20 PROCEDURE — 36415 COLL VENOUS BLD VENIPUNCTURE: CPT

## 2022-10-20 PROCEDURE — 71045 X-RAY EXAM CHEST 1 VIEW: CPT

## 2022-11-03 NOTE — DIETITIAN INITIAL EVALUATION ADULT. - IDEAL BODY WEIGHT (LBS)
EEG Electrode Fix    Electrodes Fixed: Fixed T3, T5, T4, Z    Supplies: Glue -  Collodion and Paste -  Ten[employer]     Skin break down: No       125

## 2023-02-08 NOTE — ED ADULT NURSE NOTE - NSFALLRSKASSISTTYPE_ED_ALL_ED
Standing/Walking/Toileting [Hypertension] : hypertension [Coronary Artery Disease] : coronary artery disease

## 2023-08-01 NOTE — PHYSICAL THERAPY INITIAL EVALUATION ADULT - DID THE PATIENT HAVE SURGERY?
n/a Oral Minoxidil Counseling- I discussed with the patient the risks of oral minoxidil including but not limited to shortness of breath, swelling of the feet or ankles, dizziness, lightheadedness, unwanted hair growth and allergic reaction.  The patient verbalized understanding of the proper use and possible adverse effects of oral minoxidil.  All of the patient's questions and concerns were addressed.

## 2024-01-22 NOTE — PROGRESS NOTE ADULT - PROBLEM SELECTOR PLAN 3
Brief Postoperative Note      Patient: Maria Victoria Elliott  YOB: 1950  MRN: [id number]    Date of Procedure: 1/22/2024    Lung mass    Post-Op Diagnosis: Same       CT guided lung biopsy    GILL Berrios    Assistant:  * No surgical staff found *    Anesthesia: * No anesthesia type entered *    Estimated Blood Loss (mL): Minimal    Complications: None    Specimens:   Right lung mass    Implants:  * No implants in log *      Drains: * No LDAs found *    Findings: 18 G cores      Electronically signed by GUIDO Berrios MD on 1/22/2024 at 10:07 AM   on  Eliquis 5 mg BID , hold for surgery, may consider restarting , however unsure of indication, will f/u with daughter  start lovenox for now      Dispo: Pt is from home, but due to humerus fx daughter is requesting [facility] , since pt was at [facility] before.  cm following, madeline plan d/c to [facility] in am on  Eliquis 5 mg BID , hold for surgery, may consider restarting , however unsure of indication, will f/u with daughter  start lovenox for now      Dispo: Pt is from home, but due to humerus fx daughter is requesting [facility]  [facility]. PT eval to [facility]. pending placement

## 2024-05-02 NOTE — DISCHARGE NOTE PROVIDER - NSFTFHOMEHTHYNRD_GEN_ALL_CORE
Plan of care reviewed with patient; patient in agreement. Prolia given. Appts reviewed with patient; patient uses portal.   Yes

## 2025-02-21 NOTE — DIETITIAN INITIAL EVALUATION ADULT. - FEEDING ASSISTANCE
TTE done here 1/17/25 technically difficult, but LV systolic fxn appears nl without any regional wall motion abnormalities  - BNP worsened this admission, but his symptoms are primarily due to COVID-19/bacterial PNA  - Strict I/Os  - Daily weights  - Repeat TTE pending  - Will hold off diuretics for now due to ongoing infection and SHAGUFTA  - Appreciate cardiology Provide food choices within diet Rx as available/updated

## 2025-03-17 NOTE — H&P ADULT - ASSESSMENT
(M6) obeys commands
75F, from  [facility] ECF, wheel chair bound  with PMH of Dementia, right hip fracture in rehab, sent to the emergency department for hypoxia. Patient is a poor historian and all the pertinent medical information was obtained from the Facility papers and ED provider. Daughter in law also added to some collateral information. Pt was noted to be short of breath with increased work of breathing at the facility today. She has a covid testing earlier this week that was negative. Pt in ED was noted to have SPO2- 83% on RA and was then started on 4L NC.  Denies any active smoking, alcohol or any substance abuse.     In the ED,   Pt is AA0X , confused,   Vitals- 96.1, HR 98, /01, SPO2- 98% on 4L  CXR- Clear lungs at this time. Heart enlargement again noted .WBC- 17.5  Trops 0.13  EKG - sinus tachycardia   CTA- Extensive bilateral pulmonary emboli involving all lobes.  Evidence of right heart strain.  Covid- negative

## 2025-07-11 NOTE — ED PROVIDER NOTE - NS_EDPROVIDERDISPOUSERTYPE_ED_A_ED
Called patient, notified of message, patient expressed verbal understanding had no questions at this time.     Scribe Attestation (For Scribes USE Only)... Attending Attestation (For Attendings USE Only).../Scribe Attestation (For Scribes USE Only)...